# Patient Record
Sex: MALE | Race: OTHER | HISPANIC OR LATINO | Employment: OTHER | URBAN - METROPOLITAN AREA
[De-identification: names, ages, dates, MRNs, and addresses within clinical notes are randomized per-mention and may not be internally consistent; named-entity substitution may affect disease eponyms.]

---

## 2017-03-08 ENCOUNTER — APPOINTMENT (OUTPATIENT)
Dept: LAB | Facility: CLINIC | Age: 42
End: 2017-03-08
Payer: MEDICARE

## 2017-03-08 DIAGNOSIS — M79.7 FIBROMYALGIA: ICD-10-CM

## 2017-03-08 LAB
25(OH)D3 SERPL-MCNC: 27.5 NG/ML (ref 30–100)
CRP SERPL QL: <3 MG/L
ERYTHROCYTE [SEDIMENTATION RATE] IN BLOOD: 29 MM/HOUR (ref 0–10)
T4 FREE SERPL-MCNC: 0.9 NG/DL (ref 0.76–1.46)
TSH SERPL DL<=0.05 MIU/L-ACNC: 4.36 UIU/ML (ref 0.36–3.74)

## 2017-03-08 PROCEDURE — 85652 RBC SED RATE AUTOMATED: CPT

## 2017-03-08 PROCEDURE — 84443 ASSAY THYROID STIM HORMONE: CPT

## 2017-03-08 PROCEDURE — 36415 COLL VENOUS BLD VENIPUNCTURE: CPT

## 2017-03-08 PROCEDURE — 86140 C-REACTIVE PROTEIN: CPT

## 2017-03-08 PROCEDURE — 82306 VITAMIN D 25 HYDROXY: CPT

## 2017-03-08 PROCEDURE — 84439 ASSAY OF FREE THYROXINE: CPT

## 2017-03-23 ENCOUNTER — ALLSCRIPTS OFFICE VISIT (OUTPATIENT)
Dept: OTHER | Facility: OTHER | Age: 42
End: 2017-03-23

## 2017-08-03 ENCOUNTER — ALLSCRIPTS OFFICE VISIT (OUTPATIENT)
Dept: OTHER | Facility: OTHER | Age: 42
End: 2017-08-03

## 2017-08-03 DIAGNOSIS — R79.89 OTHER SPECIFIED ABNORMAL FINDINGS OF BLOOD CHEMISTRY: ICD-10-CM

## 2017-08-03 DIAGNOSIS — R94.6 ABNORMAL RESULTS OF THYROID FUNCTION STUDIES: ICD-10-CM

## 2017-08-04 ENCOUNTER — APPOINTMENT (OUTPATIENT)
Dept: LAB | Facility: CLINIC | Age: 42
End: 2017-08-04
Payer: MEDICARE

## 2017-08-04 DIAGNOSIS — R94.6 ABNORMAL RESULTS OF THYROID FUNCTION STUDIES: ICD-10-CM

## 2017-08-04 DIAGNOSIS — R79.89 OTHER SPECIFIED ABNORMAL FINDINGS OF BLOOD CHEMISTRY: ICD-10-CM

## 2017-08-04 LAB
BUN SERPL-MCNC: 22 MG/DL (ref 5–25)
CREAT SERPL-MCNC: 1.15 MG/DL (ref 0.6–1.3)
CREAT UR-MCNC: 279 MG/DL
GFR SERPL CREATININE-BSD FRML MDRD: 78 ML/MIN/1.73SQ M
PROT UR-MCNC: 13 MG/DL
PROT/CREAT UR: 0.05 MG/G{CREAT} (ref 0–0.1)
T3 SERPL-MCNC: 1.1 NG/ML (ref 0.6–1.8)
T4 FREE SERPL-MCNC: 0.92 NG/DL (ref 0.76–1.46)
TSH SERPL DL<=0.05 MIU/L-ACNC: 3.46 UIU/ML (ref 0.36–3.74)

## 2017-08-04 PROCEDURE — 84156 ASSAY OF PROTEIN URINE: CPT

## 2017-08-04 PROCEDURE — 82570 ASSAY OF URINE CREATININE: CPT

## 2017-08-04 PROCEDURE — 84520 ASSAY OF UREA NITROGEN: CPT

## 2017-08-04 PROCEDURE — 84439 ASSAY OF FREE THYROXINE: CPT

## 2017-08-04 PROCEDURE — 82565 ASSAY OF CREATININE: CPT

## 2017-08-04 PROCEDURE — 36415 COLL VENOUS BLD VENIPUNCTURE: CPT

## 2017-08-04 PROCEDURE — 84443 ASSAY THYROID STIM HORMONE: CPT

## 2017-08-04 PROCEDURE — 84480 ASSAY TRIIODOTHYRONINE (T3): CPT

## 2017-08-10 ENCOUNTER — GENERIC CONVERSION - ENCOUNTER (OUTPATIENT)
Dept: OTHER | Facility: OTHER | Age: 42
End: 2017-08-10

## 2017-08-24 ENCOUNTER — ALLSCRIPTS OFFICE VISIT (OUTPATIENT)
Dept: OTHER | Facility: OTHER | Age: 42
End: 2017-08-24

## 2017-10-19 ENCOUNTER — ALLSCRIPTS OFFICE VISIT (OUTPATIENT)
Dept: OTHER | Facility: OTHER | Age: 42
End: 2017-10-19

## 2018-01-10 NOTE — PROGRESS NOTES
Chief Complaint  PT  CAME INTO THE OFFICE TODAY FOR A FLU VACCINE, GIVEN PER PROTOCOL      Active Problems    1  Abnormal thyroid function test (794 5) (R94 6)   2  Bipolar disease, chronic (296 80) (F31 9)   3  Chronic fatigue (780 79) (R53 82)   4  Chronic low back pain (724 2,338 29) (M54 5,G89 29)   5  Creatinine elevation (790 99) (R79 89)   6  Depression with anxiety (300 4) (F41 8)   7  Fibromyalgia (729 1) (M79 7)   8  Hypogonadism   9  Need for influenza vaccination (V04 81) (Z23)   10  History of Need for prophylactic vaccination and inoculation against influenza (V04 81)    (Z23)    Current Meds   1  BuPROPion HCl ER (XL) 150 MG Oral Tablet Extended Release 24 Hour; Therapy: 23HSV3933 to Recorded   2  ClonazePAM 0 5 MG Oral Tablet; TAKE 1 TABLET 3 times daily; Therapy: 42UYF2770 to Recorded   3  Divalproex Sodium 250 MG Oral Tablet Delayed Release; Therapy: 55MAD3940 to Recorded   4  Gabapentin 100 MG Oral Capsule; TAKE 3 CAPSULE Twice daily; Therapy: 92Gfj9361 to (Evaluate:20Jun2017)  Requested for: 78Zck5152; Last   Rx:99Cbt8413 Ordered   5  Meloxicam 7 5 MG Oral Tablet; TAKE 1 TABLET Twice daily PRN PAIN WITH FOOD; Therapy: 59WDC5406 to (Evaluate:59Ufs9089)  Requested for: 96Qgv2582; Last   Rx:23Mar2017 Ordered   6  QUEtiapine Fumarate 200 MG Oral Tablet; Therapy: 45FPZ8708 to Recorded   7  Sertraline HCl - 100 MG Oral Tablet; TAKE 1 TABLET Bedtime; Therapy: 96KUO1041 to (Evaluate:45Liy7101) Recorded   8  TraZODone HCl - 150 MG Oral Tablet; TAKE 1 TABLET AT BEDTIME; Therapy: 77BGL4110 to Recorded    Allergies    1   No Known Drug Allergies    Plan   Flulaval Quadrivalent Intramuscular Suspension; INJECT 0 5  ML Intramuscular; Dose: 0 5; Route: Intramuscular; Site: Right Deltoid; Done: 43UXK5041 02:39PM; Status: Complete - Retrospective By Protocol Authorization;  Ordered  For: Need for influenza vaccination; Ordered By:Vickie Sage; Effective Date:19Oct2017; Administered by: Elvia Mejia, Sri Ruffin: 10/19/2017 2:39:00 PM; Last Updated By: Ozzy Morales; 10/19/2017 5:12:15 PM     Signatures   Electronically signed by : Brittni Aguilar, ; Oct 19 2017  2:41PM EST                       (Author)    Electronically signed by : Rony Richard, [address]; Oct 19 2017  5:12PM EST                       (Author)

## 2018-01-12 VITALS
SYSTOLIC BLOOD PRESSURE: 120 MMHG | WEIGHT: 126.54 LBS | DIASTOLIC BLOOD PRESSURE: 80 MMHG | HEART RATE: 84 BPM | HEIGHT: 63 IN | TEMPERATURE: 98.8 F | BODY MASS INDEX: 22.42 KG/M2

## 2018-01-12 NOTE — PROGRESS NOTES
Assessment    1  Fibromyalgia (729 1) (M79 7)   2  Chronic low back pain (724 2,338 29) (M54 5,G89 29)   3  Chronic fatigue (780 79) (R53 82)   4  Depression with anxiety (300 4) (F41 8)    Plan  Fibromyalgia    · Gabapentin 100 MG Oral Capsule; TAKE 3 CAPSULE Twice daily   · (1) C-REACTIVE PROTEIN; Status:Active; Requested for:89Bix8084;    · (1) SED RATE; Status:Active; Requested for:31Yft5044;    · (1) TSH WITH FT4 REFLEX; Status:Active; Requested for:84Xfc8090;    · (1) VITAMIN D 25-HYDROXY; Status:Active; Requested for:38Tzo3926;    · Follow-up visit in 3 months Evaluation and Treatment  Follow-up  Status: Hold For -  Scheduling  Requested for: 31QUD6073   · Call [phone] if: The pain seems worse ; Status:Complete;   Done: 90FCT4290  PMH: Need for influenza vaccination    · Flulaval Quadrivalent Intramuscular Suspension    Discussion/Summary    Mr Arya Giles is a 44-year-old  male who presents the office with a history of constant pain in his neck, lower back, and shoulders  He does report that he has a history of a cervical fusion which was performed several years ago  He recently had an MRI of his lumbar spine, which did show some bulging disks  He will be scheduled for an epidural injection in the near future  He is currently utilizing gabapentin 100 mg once or twice a day as needed for his discomfort which provides mild relief  He does state that his lower back pain radiates into his buttocks bilaterally, and into his left leg  He reports paresthesias in his left leg to the level of the toes  He does state that his radicular pain localizes to his knees bilaterally left more so than right  He has also tried tramadol for pain in the past, but he does feel this is "too strong " He states that her pain is worsened by lifting heavy objects or walking long distances  He describes his pain as being sharp, aching, burning, and stabbing in quality   He does report some intermittent arthralgias in his hands, with some swelling noted in the right hand  He denies any other obvious joint swelling  He reports morning stiffness typically lasting about 20 minutes before improvement  He reports difficulty sleeping due to pain, nonrestorative sleep, and occasional fatigue  On exam, there is no active synovitis  He does have mild tenderness to palpation of the first MCPs bilaterally  There are multiple myofascial tender points throughout the spine  He has decreased range of motion of the cervical and lumbar spine in all planes of motion  There is no objective muscular weakness  Reflexes are symmetric throughout  Review of laboratory studies from August 24, 2016 revealed a normal CBC and CMP, as well as negative Lyme antibody, DAMON, rheumatoid factor, and CCP  At this time, his history, exam, and laboratory studies do appear most consistent with a chronic myofascial pain syndrome, likely fibromyalgia given his history of headaches and intermittent bowel issues  I did explain the gabapentin is not meant to be taken as needed, but more consistently so they settled the nerve fibers down and have better efficacy on his pain  I will increase his dose of gabapentin to 300 mg twice a day  I will check some additional laboratory studies to completely rule out any metabolic or inflammatory cause for his pain  I will reevaluate him in 3 months  He will call in the interim if there are any questions or concerns  Patient is able to Self-Care  Counseling  Rheumatology Counseling Documentation: The patient was counseled regarding diagnostic results, instructions for management, impressions and risks and benefits of treatment options  Chief Complaint    1  Arthralgias    History of Present Illness  Pt  is a 41 yo  male who presents with history of cervical fusion several years ago  Also with LBP pain  Recently had MRI which showed bulging discs  c/o constant pain in neck, lower back, and shoulders   Takes Gabapentin 100mg once or twice daily for pain as needed  LBP radiates into buttocks B/L and L leg  + paresthesias in L leg to the toes  Radicular pain localizes to B/L knees  Has also tried to Tramadol for pain, but was too strong  Pain is worsened by lifting heavy objects or walking long distances  Pain is sharp, aching, stabbing and burning in quality  + swelling in R hand  No other obvious joint swelling  + AM stiffness x 20 minutes  + difficulty sleeping due to pain  + non-restorative sleep  + occasional fatigue  Pain Assessment   the patient states they have pain  The pain is located in the neck, lower back and both shoulders  The patient describes the pain as sharp, aching, burning and throbbing  (on a scale of 0 to 10, the patient rates the pain at 6 )    Prefered Language is  [place]  Primary Language is  English  Review of Systems    Constitutional: no fever, no recent weight gain, no chills, no recent weight loss and no anorexia    The patient presents with complaints of occasional episodes of fatigue  HEENT: feeling congested, but no blurred vision, no double vision, no amaurosis fugax, no dryness of the eyes, no eye pain, no erythema eye(s), no dryness mouth and no mouth sores    The patient presents with complaints of sore throat starting about 1 week ago  Cardiovascular: no chest pain or pressure, no dyspnea on exertion and no swelling in the arms or legs  Respiratory: no unusual or persistent cough and no shortness of breath  Gastrointestinal: abdominal pain and constipation, but no nausea, no vomiting, no heartburn, no melena and no BRBPR    The patient presents with complaints of diarrhea starting about 1 week ago  Genitourinary: no foamy urine and increased frequency, but no hematuria    The patient presents with complaints of occasional episodes of dysuria  Musculoskeletal: as noted in HPI  Integumentary no rash, no Raynaud's, no alopecia, no nail changes and no photosensitivity  Endocrine polydipsia, but no polyuria  Hematologic/Lymphatic: a tendency for easy bruising, but no unusual bleeding  Neurological: headache, tingling and weakness    The patient presents with complaints of occasional episodes of vertigo or dizziness, described as spinning sensation  Symptoms are made worse by bending over and getting up quickly  Psychiatric: insomnia, non-restorative sleep, depression and anxiety  Active Problems    1  Arthralgia (719 40) (M25 50)   2  Chronic fatigue (780 79) (R53 82)   3  Chronic low back pain (724 2,338 29) (M54 5,G89 29)   4  Depression with anxiety (300 4) (F41 8)   5  Hemorrhoids (455 6) (K64 9)   6  Hypogonadism   7  History of Need for prophylactic vaccination and inoculation against influenza (V04 81)   (Z23)    Past Medical History    1  History of Need for influenza vaccination (V04 81) (Z23)   2  History of Need for prophylactic vaccination and inoculation against influenza (V04 81)   (Z23)    Surgical History    1  History of Cervical Vertebral Fusion    Family History  Mother    1  Family history of arthritis (V17 7) (Z82 61)   2  Family history of hyperlipidemia (V18 19) (Z83 49)  Father    3  Family history of hyperthyroidism (V18 19) (Z83 49)   4  Family history of Prostate disease  Sister    11  Family history of Colon cancer   6  Family history of epilepsy (V17 2) (Z82 0)  Brother    9  Family history of asthma (V17 5) (Z82 5)  Family History    8  Denied: Family history of rheumatoid arthritis    Social History    · Disabled   · Never A Smoker   · No alcohol use   · No illicit drug use    Current Meds   1  BuPROPion HCl - 75 MG Oral Tablet; Take 1 tablet daily; Therapy: 04CCW0785 to (Evaluate:45Zrv5069) Recorded   2  ClonazePAM 0 5 MG Oral Tablet; TAKE 1 TABLET 3 times daily; Therapy: 35KMU0396 to Recorded   3  Divalproex Sodium 250 MG Oral Tablet Delayed Release; Therapy: 19AYB7146 to Recorded   4   Meloxicam 15 MG Oral Tablet; TAKE 1 TABLET DAILY AS NEEDED FOR PAIN;   Therapy: 14HRO4880 to (Evaluate:17Oct2016)  Requested for: 55Xof9869; Last   Rx:10Abv2984 Ordered   5  Sertraline HCl - 100 MG Oral Tablet; TAKE 1 TABLET Bedtime; Therapy: 94AWK0114 to (Evaluate:90Yrq5067) Recorded   6  TraZODone HCl - 150 MG Oral Tablet; TAKE 1 TABLET AT BEDTIME; Therapy: 00ZUZ1235 to Recorded    Allergies    1  No Known Drug Allergies    Vitals  Signs   Recorded: 22Dec2016 02:51PM   Temperature: 98 F  Heart Rate: 72  Systolic: 238  Diastolic: 80  Height: 5 ft 3 in  Weight: 135 lb 5 79 oz  BMI Calculated: 23 98  BSA Calculated: 1 64    Physical Exam    Constitutional   General appearance: Abnormal   appears tired  Eyes   Conjunctiva and lids: No swelling, erythema or discharge  Pupils and irises: Equal, round and reactive to light  Ears, Nose, Mouth, and Throat   External inspection of ears and nose: Normal     Oropharynx: Normal with no erythema, edema, exudate lesions, or ulcers  Pulmonary   Respiratory effort: No increased work of breathing or signs of respiratory distress  Auscultation of lungs: Clear to auscultation  Cardiovascular   Auscultation of heart: Normal rate and rhythm, normal S1 and S2, without murmurs  Examination of extremities for edema and/or varicosities: Normal     Lymphatic   Palpation of lymph nodes in neck: No lymphadenopathy  Psychiatric   Orientation to person, place, and time: Normal     Mood and affect: Normal         Right glenohumeral joint tenderness  Left glenohumeral joint tenderness  Musculoskeletal - Muscle strength/tone: Normal  Motor Strength Findings: right hand dominance, but normal upper extremity strength and normal lower extremity strength  Right upper extremity: shoulder flexion 5/5, shoulder extension 5/5, biceps 5/5, triceps 5/5, but normal hand   Left upper extremity shoulder flexion 5/5, shoulder extension 5/5, biceps 5/5, triceps 5/5, but normal hand      Right lower extremity strength: hip flexion 5/5, hip abduction 5/5, hip adduction 5/5, knee flexion 5/5, knee extension 5/5, ankle dorsiflexion 5/5, ankle plantar flexion 5/5  Left lower extremity strength: hip flexion 5/5, hip abduction 5/5, hip adduction 5/5, knee flexion 5/5, knee extension 5/5, ankle dorsiflexion 5/5, ankle plantar flexion 5/5  Skin - Skin and subcutaneous tissue: Normal    Neurologic - Reflexes: Normal  Deep tendon reflexes: 2+ right biceps, 2+ left biceps, 2+ right triceps, 2+ left triceps, 2+ right brachioradialis, 2+ left brachioradialis, 2+ right patella, 2+ left patella, 2+ right ankle jerk and 2+ left ankle jerk  Sensation: Normal    Additional Findings - Decreased ROM C-spine and L-spine; + multiple myofascial tender points throughout spine  Results/Data  * MRI CERVICAL SPINE WO CONTRAST [id number] 07:22PM EPIC, Provider   Test ordered by: Conrado Bolanos     Test Name Result Flag Reference   MRI CERVICAL SPINE WO CONTRAST (Report)     This is a summary report  The complete report is available in the patient's medical record  If you cannot access the medical record, please contact the sending organization for a detailed fax or copy  MRI CERVICAL SPINE WITHOUT CONTRAST     INDICATION: 57-year-old male, postlaminectomy syndrome, radiculopathy   COMPARISON: 11/11/2011 x-rays     TECHNIQUE: Sagittal T1, sagittal T2, sagittal inversion recovery, axial T2, axial 2D merge        IMAGE QUALITY: Diagnostic     FINDINGS:   Intact appearing plate and screw ACDF with interbody spacer at C5-6, unchanged     ALIGNMENT: Normal alignment of the cervical spine  No compression fracture  No subluxation  No scoliosis  MARROW SIGNAL: Normal marrow signal is identified within the visualized bony structures  No discrete marrow lesion  CERVICAL AND VISUALIZED THORACIC CORD: Normal signal within the visualized cord       PREVERTEBRAL AND PARASPINAL SOFT TISSUES: Prevertebral and paraspinal soft tissues are unremarkable  VISUALIZED POSTERIOR FOSSA: The visualized posterior fossa demonstrates no abnormal signal      CERVICAL DISC SPACES:        C2-C3: Normal      C3-C4: Normal      C4-C5: Normal      C5-C6: Intact appearing ACDF, mild left foraminal stenosis, patent central canal and right foramen, no overt neural element impingement  C6-C7: Minimal degenerative disc and facet disease, no stenosis     C7-T1: Normal      UPPER THORACIC DISC SPACES: Normal        IMPRESSION:   Intact appearing ACDF C5-6, similar to prior x-rays     Mild noncompressive left foraminal stenosis C5-6       Workstation performed: [id number]     Signed by:   Nino Diego MD   12/19/16     * MRI LUMBAR SPINE WO CONTRAST [id number] 09:17PM EPIC, Provider   Test ordered by: Leann Diane     Test Name Result Flag Reference   MRI LUMBAR SPINE WO CONTRAST (Report)     MRI LUMBAR SPINE WITHOUT CONTRAST     INDICATION: Lumbar radiculopathy  Bilateral lower extremity numbness     COMPARISON: 3/28/2012     TECHNIQUE: Sagittal T1, sagittal T2, sagittal inversion recovery, axial T1 and axial T2, coronal T2       IMAGE QUALITY: Diagnostic     FINDINGS:     ALIGNMENT: Normal alignment of the lumbar spine  No compression fracture  No spondylolysis or spondylolisthesis  No scoliosis  MARROW SIGNAL: Normal marrow signal is identified within the visualized bony structures  No discrete marrow lesion  DISTAL CORD AND CONUS: Normal size and signal within the distal cord and conus  The conus ends at the L1 level  PARASPINAL SOFT TISSUES: Paraspinal soft tissues are unremarkable  SACRUM: Normal signal within the sacrum  No evidence of insufficiency or stress fracture  LOWER THORACIC DISC SPACES: Normal disc height and signal  No disc herniation, canal stenosis or foraminal narrowing  LUMBAR DISC SPACES:        L1-L2: Normal      L2-L3: Normal      L3-L4: There is a diffuse disk bulge   No significant central canal or neural foraminal narrowing  L4-L5: There is a diffuse disk bulge  No significant central canal or neural foraminal narrowing  L5-S1: There is a diffuse disk bulge  No significant central canal or neural foraminal narrowing  IMPRESSION:     There is no focal disk herniation, central canal or neural foraminal narrowing  Workstation performed: [id number]     Signed by: Solitario Juarez MD   11/10/16     (1) CBC/PLT/DIFF 46ZQI5664 10:19AM Sharif Cord Order Number: [id number]     Test Name Result Flag Reference   WBC COUNT 5 24 Thousand/uL  4 31-10 16   RBC COUNT 5 30 Million/uL  3 88-5 62   HEMOGLOBIN 15 3 g/dL  12 0-17 0   HEMATOCRIT 44 5 %  36 5-49 3   MCV 84 fL  82-98   MCH 28 9 pg  26 8-34 3   MCHC 34 4 g/dL  31 4-37 4   RDW 12 7 %  11 6-15 1   MPV 10 7 fL  8 9-12 7   PLATELET COUNT 468 Thousands/uL  149-390   nRBC AUTOMATED 0 /100 WBCs     NEUTROPHILS RELATIVE PERCENT 53 %  43-75   LYMPHOCYTES RELATIVE PERCENT 35 %  14-44   MONOCYTES RELATIVE PERCENT 10 %  4-12   EOSINOPHILS RELATIVE PERCENT 2 %  0-6   BASOPHILS RELATIVE PERCENT 0 %  0-1   NEUTROPHILS ABSOLUTE COUNT 2 78 Thousands/?L  1 85-7 62   LYMPHOCYTES ABSOLUTE COUNT 1 83 Thousands/?L  0 60-4 47   MONOCYTES ABSOLUTE COUNT 0 52 Thousand/?L  0 17-1 22   EOSINOPHILS ABSOLUTE COUNT 0 09 Thousand/?L  0 00-0 61   BASOPHILS ABSOLUTE COUNT 0 01 Thousands/?L  0 00-0 10   - Patient Instructions: This bloodwork is non-fasting  Please drink two glasses of water morning of bloodwork  - Patient Instructions: This bloodwork is non-fasting  Please drink two glasses of water morning of bloodwork  (1) COMPREHENSIVE METABOLIC PANEL 85NCQ9277 33:73DA Sharif Cord Order Number: [id number]     Test Name Result Flag Reference   GLUCOSE,RANDM 82 mg/dL     If the patient is fasting, the ADA then defines impaired fasting glucose as > 100 mg/dL and diabetes as > or equal to 123 mg/dL     SODIUM 140 mmol/L  136-145   POTASSIUM 4 2 mmol/L  3 5-5 3   CHLORIDE 102 mmol/L  100-108   CARBON DIOXIDE 29 mmol/L  21-32   ANION GAP (CALC) 9 mmol/L  4-13   BLOOD UREA NITROGEN 26 mg/dL H 5-25   CREATININE 1 19 mg/dL  0 60-1 30   Standardized to IDMS reference method   CALCIUM 9 8 mg/dL  8 3-10 1   BILI, TOTAL 0 52 mg/dL  0 20-1 00   ALK PHOSPHATAS 49 U/L     ALT (SGPT) 30 U/L  12-78   AST(SGOT) 17 U/L  5-45   ALBUMIN 4 3 g/dL  3 5-5 0   TOTAL PROTEIN 8 2 g/dL  6 4-8 2   eGFR Non-African American      >60 0 ml/min/1 73sq m   [facility] Disease Education Program recommendations are as follows:  GFR calculation is accurate only with a steady state creatinine  Chronic Kidney disease less than 60 ml/min/1 73 sq  meters  Kidney failure less than 15 ml/min/1 73 sq  meters  (1) RHEUMATOID FACTOR SCREEN 48Edg8006 10:19AM Ra Harrell Order Number: [id number]     Test Name Result Flag Reference   RHEUMATOID FACTOR Negative  Negative     (1) DAMON SCREEN W/REFLEX TO TITER/PATTERN 38ACV1482 10:19AM Ra Harrell Order Number: [id number]     Test Name Result Flag Reference   DAMON SCREEN  Negative  Negative     (1) CYCLIC CITRULLINATED PEPTIDE 30Isg4327 10:19AM Ra Harrell Order Number: [id number]     Test Name Result Flag Reference   CYCLIC CITRULLINATED PEPTIDE 12 units  0 - 19   Negative               <20                            Weak positive      20 - 39                            Moderate positive  40 - 59                            Strong positive        >59    Performed at:  [address]  [id number]  : Harleen Pean MD, Phone:  [phone]     (1) LYME ANTIBODY PROFILE W/REFLEX TO WESTERN BLOT 53Mle1403 10:19AM Ra Harrell Order Number: [id number]     Test Name Result Flag Reference   LYME IGG 0 31  0 00-0 79   NEGATIVE(0 00-0 79)-Absence of detectable Borrelia IgG Antibodies   A negative result does not exclude the possibility of Borrelia infection  If early Lyme disease is suspected,a second sample should be collected & tested 4 weeks after initial testing  LYME IGM 0 17  0 00-0 79   NEGATIVE (0 00-0 79)-Absence of detectable Borrelia IgM antibodies  A negative result does not exclude the possibility of Borrelia infection  If early lyme disease is suspected, a second sample should be collected & tested 4 weeks after initial testing         Future Appointments    Date/Time Provider Specialty Site   03/23/2017 03:00 PM See Gruber, [address] [place] [address]     Signatures   Electronically signed by : Wanda Alonzo DO; Dec 22 2016  4:37PM EST                       (Author)

## 2018-01-13 NOTE — PROGRESS NOTES
Assessment    1  Fibromyalgia (729 1) (M79 7)   2  Chronic low back pain (724 2,338 29) (M54 5,G89 29)   3  Depression with anxiety (300 4) (F41 8)   4  Bipolar disease, chronic (296 80) (F31 9)    Plan    1  Follow-up visit in 6 months Evaluation and Treatment  Follow-up  Status: Hold For -   Scheduling  Requested for: 30Ldi4517   2  Call [phone] if: The pain seems worse ; Status:Complete;   Done: 49FTM1549   3  Call [phone] if: The symptoms seem worse ; Status:Complete;   Done:   47WCT1937   4  Call [phone] if: You have questions or concerns about your problem ;   Status:Complete;   Done: 89VCB4223    5  Gabapentin 100 MG Oral Capsule; TAKE 3 CAPSULE Twice daily   6  Meloxicam 7 5 MG Oral Tablet; TAKE 1 TABLET Twice daily PRN PAIN WITH   FOOD    Discussion/Summary    This is a 19-year-old gentleman presenting today for follow-up for fibromyalgia  Patient states he feels stable  He is utilizing gabapentin and meloxicam as needed  He describes neck as well as bilateral shoulder and low back pain  He also describes shooting pain in the left knee as well as the left thumb  He is seeing pain management at this time for injections into the lumbar spine  He did note episode of swelling in the right index finger however denies any further joint swelling  He does have morning stiffness lasting about 5 minutes  He describes some difficulty with sleep as well as fatigue  On physical examination, patient does have tenderness of the CMC joints bilaterally as well as both shoulders  He does have tenderness of the cervical and lumbar spine with multiple myofascial tender points  He has tenderness of the hips as well as the knees in the ankles  There is crepitus of the knees   At this time patient's history and physical examination is most consistent with fibromyalgia which appears to be stable with the use of gabapentin and meloxicam  He will continue follow-up with pain management at this time for further treatment of his low back pain  He will plan to return to the office in 6 months time for further evaluation however contact the office in the interim if he has any further questions or concerns  Counseling  Rheumatology Counseling Documentation: The patient was counseled regarding instructions for management, prognosis, patient and family education, risks and benefits of treatment options and importance of compliance with treatment  Chief Complaint  F/U FMS   Patient is here today for follow up of chronic conditions described in HPI  History of Present Illness  Patient is in the office today for follow up for FMS  Feeling stable at this time  Taking gabapentin and Mobic with some relief but rarely takes  Pain in the neck, shoulders, and low back  Also shooting pain in to the left knee and pain in the left thumb  +Swelling in the right index finger  No other obvious joint swelling  +Am stiffness x 5 minutes  +Some difficulty with sleep  +Some fatigue  Review of Systems    Constitutional: fatigue, recent 3 lb weight loss and anorexia, but no fever, no recent weight gain and no chills  HEENT: blurred vision and amaurosis fugax, but no double vision, no dryness of the eyes, no dryness mouth, not feeling congested and no sore throat  Cardiovascular: dyspnea on exertion, but no chest pain or pressure  Respiratory: no unusual or persistent cough, no shortness of breath and no pleurisy  Gastrointestinal: abdominal pain and constipation, but no nausea, no vomiting, no diarrhea, no melena and no BRBPR  Genitourinary: dysuria, but no hematuria  Musculoskeletal: as noted in HPI  Integumentary no rash, no Raynaud's and no alopecia  Endocrine no polyuria and no polydipsia  Hematologic/Lymphatic: no unusual bleeding and no tendency for easy bruising  Neurological: vertigo or dizziness, tingling and weakness, but no headache  Psychiatric: insomnia and non-restorative sleep  Active Problems    1  Abnormal thyroid function test (794 5) (R94 6)   2  Bipolar disease, chronic (296 80) (F31 9)   3  Chronic fatigue (780 79) (R53 82)   4  Chronic low back pain (724 2,338 29) (M54 5,G89 29)   5  Creatinine elevation (790 99) (R79 89)   6  Depression with anxiety (300 4) (F41 8)   7  Fibromyalgia (729 1) (M79 7)   8  Hypogonadism   9  History of Need for prophylactic vaccination and inoculation against influenza (V04 81)   (Z23)    Past Medical History    1  History of hemorrhoids (V13 89) (Z87 19)   2  History of joint pain (V13 59) (Z87 39)   3  History of Need for influenza vaccination (V04 81) (Z23)   4  History of Need for prophylactic vaccination and inoculation against influenza (V04 81)   (Z23)    The active problems and past medical history were reviewed and updated today  Surgical History    1  History of Cervical Vertebral Fusion    The surgical history was reviewed and updated today  Family History  Mother    1  Family history of arthritis (V17 7) (Z82 61)   2  Family history of hyperlipidemia (V18 19) (Z83 49)  Father    3  Family history of hyperthyroidism (V18 19) (Z83 49)   4  Family history of Prostate disease  Sister    11  Family history of Colon cancer   6  Family history of epilepsy (V17 2) (Z82 0)  Brother    9  Family history of asthma (V17 5) (Z82 5)  Family History    8  Denied: Family history of rheumatoid arthritis    The family history was reviewed and updated today  Social History    · Disabled   · Never A Smoker   · No alcohol use   · No illicit drug use  The social history was reviewed and updated today  The social history was reviewed and is unchanged  Current Meds   1  BuPROPion HCl ER (XL) 150 MG Oral Tablet Extended Release 24 Hour; Therapy: 31NBF7622 to Recorded   2  ClonazePAM 0 5 MG Oral Tablet; TAKE 1 TABLET 3 times daily; Therapy: 45MLE5066 to Recorded   3  Divalproex Sodium 250 MG Oral Tablet Delayed Release;    Therapy: 26ADJ5878 to Recorded   4  Gabapentin 100 MG Oral Capsule; TAKE 3 CAPSULE Twice daily; Therapy: 77Uas7388 to (Evaluate:20Jun2017)  Requested for: 23Mar2017; Last   Rx:84Sgz3747 Ordered   5  Meloxicam 7 5 MG Oral Tablet; TAKE 1 TABLET Twice daily PRN PAIN WITH FOOD; Therapy: 85QTH6047 to (Evaluate:43Yde7029)  Requested for: 28LZB3324; Last   Rx:23Mar2017 Ordered   6  QUEtiapine Fumarate 200 MG Oral Tablet; Therapy: 27AMO5561 to Recorded   7  Sertraline HCl - 100 MG Oral Tablet; TAKE 1 TABLET Bedtime; Therapy: 03OCA7023 to (Evaluate:63Fyx8715) Recorded   8  TraZODone HCl - 150 MG Oral Tablet; TAKE 1 TABLET AT BEDTIME; Therapy: 33EED9949 to Recorded    The medication list was reviewed and updated today  Immunizations  Influenza --- Kristal Sarmiento: [id number]-4390Akukfyr Sawyer: 22-Dec-2016     Allergies    1  No Known Drug Allergies    Vitals  Signs   Recorded: 24Aug2017 12:58PM   Temperature: 98 2 F  Heart Rate: 76  Systolic: 168  Diastolic: 88  Height: 5 ft 3 75 in  Weight: 123 lb 14 37 oz  BMI Calculated: 21 44  BSA Calculated: 1 59    Physical Exam    Constitutional   General appearance: No acute distress, well appearing and well nourished  Eyes   Conjunctiva and lids: No swelling, erythema or discharge  Pupils and irises: Equal, round and reactive to light  Ears, Nose, Mouth, and Throat   External inspection of ears and nose: Normal     Oropharynx: Normal with no erythema, edema, exudate lesions, or ulcers  Pulmonary   Respiratory effort: No increased work of breathing or signs of respiratory distress  Auscultation of lungs: Clear to auscultation  Cardiovascular   Auscultation of heart: Normal rate and rhythm, normal S1 and S2, without murmurs  Examination of extremities for edema and/or varicosities: Normal     Lymphatic   Palpation of lymph nodes in neck: No lymphadenopathy      Psychiatric   Orientation to person, place, and time: Normal     Mood and affect: Normal       Right thumb CMC tenderness  Right glenohumeral joint tenderness  Left thumb CMC tenderness  Left glenohumeral joint tenderness  Right knee tenderness  Right hip tenderness  Left ankle tenderness  Left knee tenderness  Left hip tenderness  Musculoskeletal - Joints, bones, and muscles: Abnormal  Palpation - lower mid-lumbar, right lower lumbar, left lower lumbar, C5, C6 and C7 tenderness and bilateral knee crepitus  Right foot: All MTP, PIP and DIP joints are normal  Left foot: All MTP, PIP and DIP joints are normal       Attending Note  Collaborating Physician: I did not interview and examine the patient, I discussed the case with the Advanced Practitioner and reviewed the note and I agree with the Advanced Practitioner note  Signatures   Electronically signed by : Terry Aponte, [facility];  Aug 24 2017  1:15PM EST                       (Author)    Electronically signed by : Wanda Alonzo DO; Aug 24 2017  2:00PM EST                       (Author)

## 2018-01-17 NOTE — PROGRESS NOTES
Assessment    1  Fibromyalgia (729 1) (M79 7)   2  Chronic low back pain (724 2,338 29) (M54 5,G89 29)   3  Depression with anxiety (300 4) (F41 8)   4  Chronic fatigue (780 79) (R53 82)    Plan    1  Meloxicam 7 5 MG Oral Tablet; TAKE 1 TABLET Twice daily PRN PAIN WITH FOOD   2  Gabapentin 100 MG Oral Capsule; TAKE 3 CAPSULE Twice daily   3  Call [phone] if: The pain seems worse ; Status:Complete;   Done: 98CGJ1979   4  Call [phone] if: The symptoms seem worse ; Status:Complete;   Done:   59CLI0564   5  Call [phone] if: You have questions or concerns about your problem ;   Status:Complete;   Done: 76GAL4631   6  Follow-up visit in 4 Months Evaluation and Treatment  Follow-up  Status: Complete    Done: 84HAM8378    Discussion/Summary    This is a 40-year-old female presenting today for follow-up for fibromyalgia  Patient states he continues to have neck, shoulder, and low back pain  He also describes pain behind the right knee  He does have pain in bilateral thumbs as well as the wrists bilaterally  He denies any obvious joint swelling at this time but does have morning stiffness lasting a few minutes  He does have occasional difficulty with sleep due to pain as well as occasional nonrestorative sleep and fatigue  He's been utilizing gabapentin 300 mg daily with some relief  On physical examination, there is no active synovitis  Patient does have tenderness of the thumb joints bilaterally as well as the shoulders bilaterally  He also has tenderness of the cervical and lumbar spine as well as the right knee  There is crepitus of the knees  At this time patient's history and physical examination is most consistent with fibromyalgia which appears to be active at this time despite gabapentin 300 mg daily  Patient would not like to escalate his dose at this time however is interested in re-trialing meloxicam 7 5 mg to be taken twice a day as needed for pain with food   Patient will plan to Follow-up with his pain management physician  He will contact the office if he has any further questions or concerns however will follow-up in 4 months time for further evaluation  Counseling  Rheumatology Counseling Documentation: The patient was counseled regarding instructions for management, prognosis, patient and family education, risks and benefits of treatment options and importance of compliance with treatment  Chief Complaint  F/U FMS   Patient is here today for follow up of chronic conditions described in HPI  History of Present Illness  Patient is in the office today for follow up for FMS  Pain in the neck, shoulders, and low back  Pain behind right knee  +Pain in the thumb and wrists  No obvious joint swelling  +Am stiffness x few minutes  +Occasional difficulty with sleep due to pain  +Occasional non restorative sleep  +Fatigue  Taking Gabapentin 100 mg TID  Pain Assessment   the patient states they have pain  The pain is located in the neck, shoulders, and lower back  (on a scale of 0 to 10, the patient rates the pain at 5 )    Prefered Language is  [place]  Primary Language is  English  Review of Systems    Constitutional: fatigue and anorexia, but no fever, no recent weight gain, no chills and no recent weight loss  HEENT: blurred vision and amaurosis fugax, but no double vision, no dryness of the eyes, no dryness mouth, not feeling congested and no sore throat  Cardiovascular: dyspnea on exertion, but no chest pain or pressure  Respiratory: no unusual or persistent cough, no shortness of breath and no pleurisy  Gastrointestinal: abdominal pain and constipation, but no nausea, no vomiting, no diarrhea, no melena and no BRBPR  Genitourinary: dysuria, but no hematuria  Musculoskeletal: as noted in HPI  Integumentary no rash, no Raynaud's and no alopecia  Endocrine no polyuria and no polydipsia     Hematologic/Lymphatic: no unusual bleeding and no tendency for easy bruising  Neurological: headache, vertigo or dizziness, tingling and weakness  Psychiatric: insomnia and non-restorative sleep  Active Problems    1  Chronic fatigue (780 79) (R53 82)   2  Chronic low back pain (724 2,338 29) (M54 5,G89 29)   3  Depression with anxiety (300 4) (F41 8)   4  Fibromyalgia (729 1) (M79 7)    Past Medical History    1  History of Need for influenza vaccination (V04 81) (Z23)   2  History of Need for prophylactic vaccination and inoculation against influenza (V04 81)   (Z23)    The active problems and past medical history were reviewed and updated today  Surgical History    1  History of Cervical Vertebral Fusion    The surgical history was reviewed and updated today  Family History    The family history was reviewed and updated today  Social History    · Disabled   · Never A Smoker   · No alcohol use   · No illicit drug use  The social history was reviewed and updated today  The social history was reviewed and is unchanged  Current Meds   1  BuPROPion HCl - 75 MG Oral Tablet; Take 1 tablet daily; Therapy: 17RKC4469 to (Evaluate:27Nov2016) Recorded   2  ClonazePAM 0 5 MG Oral Tablet; TAKE 1 TABLET 3 times daily; Therapy: 20QQI0455 to Recorded   3  Divalproex Sodium 250 MG Oral Tablet Delayed Release; Therapy: 20MWO9922 to Recorded   4  Gabapentin 100 MG Oral Capsule; TAKE 3 CAPSULE Twice daily; Therapy: 44Lhf1783 to (Evaluate:20Jun2017)  Requested for: 69Emn7362; Last   Rx:71Nmm6103 Ordered   5  Meloxicam 15 MG Oral Tablet; TAKE 1 TABLET DAILY AS NEEDED FOR PAIN;   Therapy: 03WGZ2902 to (Evaluate:17Oct2016)  Requested for: 70Rmx1819; Last   Rx:81Pdj4970 Ordered   6  Sertraline HCl - 100 MG Oral Tablet; TAKE 1 TABLET Bedtime; Therapy: 62IUF2930 to (Evaluate:41Yvv3254) Recorded   7  TraZODone HCl - 150 MG Oral Tablet; TAKE 1 TABLET AT BEDTIME; Therapy: 48GYX0627 to Recorded    The medication list was reviewed and updated today  Immunizations  Influenza --- Isac Escobedover: [id number]-Age-9214Fggh High: 22-Dec-2016     Allergies    1  No Known Drug Allergies    Vitals  Signs   Recorded: 27CPC5389 02:59PM   Temperature: 16 2 F  Systolic: 213  Diastolic: 84  Height: 5 ft 3 in  Weight: 132 lb 4 48 oz  BMI Calculated: 23 43  BSA Calculated: 1 62    Physical Exam    Constitutional   General appearance: No acute distress, well appearing and well nourished  Eyes   Conjunctiva and lids: No swelling, erythema or discharge  Pupils and irises: Equal, round and reactive to light  Ears, Nose, Mouth, and Throat   External inspection of ears and nose: Normal     Oropharynx: Normal with no erythema, edema, exudate lesions, or ulcers  Pulmonary   Respiratory effort: No increased work of breathing or signs of respiratory distress  Auscultation of lungs: Clear to auscultation  Cardiovascular   Auscultation of heart: Normal rate and rhythm, normal S1 and S2, without murmurs  Examination of extremities for edema and/or varicosities: Normal     Lymphatic   Palpation of lymph nodes in neck: No lymphadenopathy  Psychiatric   Orientation to person, place, and time: Normal     Mood and affect: Normal       Right thumb CMC tenderness  Right glenohumeral joint tenderness  Left thumb CMC tenderness  Left glenohumeral joint tenderness  Right knee tenderness  Musculoskeletal - Joints, bones, and muscles: Abnormal  Palpation - lower mid-lumbar, right lower lumbar, left lower lumbar, C5, C6 and C7 tenderness and bilateral knee crepitus  Right hand: All MCP, PIP and DIP joints are normal  Left Hand: All MCP, PIP and DIP joints are normal    Right foot: All MTP, PIP and DIP joints are normal  Left foot: All MTP, PIP and DIP joints are normal       Attending Note  Collaborating Physician: I did not interview and examine the patient, I discussed the case with the Advanced Practitioner and reviewed the note and I agree with the Advanced Practitioner note  Signatures   Electronically signed by : SUSANA Andrews; Mar 23 2017  3:23PM EST                       (Author)    Electronically signed by : Brandon Sue DO; Mar 23 2017  4:25PM EST                       (Author)

## 2018-01-22 VITALS
HEART RATE: 76 BPM | WEIGHT: 123.9 LBS | HEIGHT: 64 IN | TEMPERATURE: 98.2 F | BODY MASS INDEX: 21.15 KG/M2 | DIASTOLIC BLOOD PRESSURE: 88 MMHG | SYSTOLIC BLOOD PRESSURE: 112 MMHG

## 2018-01-22 VITALS
DIASTOLIC BLOOD PRESSURE: 84 MMHG | WEIGHT: 132.28 LBS | TEMPERATURE: 98.8 F | HEIGHT: 63 IN | BODY MASS INDEX: 23.44 KG/M2 | SYSTOLIC BLOOD PRESSURE: 116 MMHG

## 2018-07-17 ENCOUNTER — TELEPHONE (OUTPATIENT)
Dept: INTERNAL MEDICINE CLINIC | Facility: CLINIC | Age: 43
End: 2018-07-17

## 2018-07-17 DIAGNOSIS — M79.7 FIBROMYALGIA: Primary | ICD-10-CM

## 2018-07-17 PROBLEM — F31.9 BIPOLAR DISEASE, CHRONIC (HCC): Status: ACTIVE | Noted: 2017-08-03

## 2018-07-17 RX ORDER — BUPROPION HYDROCHLORIDE 150 MG/1
TABLET ORAL
COMMUNITY
Start: 2017-07-31 | End: 2018-07-24 | Stop reason: ALTCHOICE

## 2018-07-17 RX ORDER — CLONAZEPAM 0.5 MG/1
1 TABLET ORAL 3 TIMES DAILY
COMMUNITY
Start: 2016-08-18 | End: 2019-05-07 | Stop reason: ALTCHOICE

## 2018-07-17 RX ORDER — GABAPENTIN 100 MG/1
3 CAPSULE ORAL 2 TIMES DAILY
COMMUNITY
Start: 2016-12-22 | End: 2018-08-08

## 2018-07-17 RX ORDER — DIVALPROEX SODIUM 250 MG/1
TABLET, DELAYED RELEASE ORAL
COMMUNITY
Start: 2016-07-14 | End: 2018-07-24 | Stop reason: ALTCHOICE

## 2018-07-17 RX ORDER — TRAZODONE HYDROCHLORIDE 150 MG/1
1 TABLET ORAL
COMMUNITY
Start: 2016-10-28 | End: 2018-07-24 | Stop reason: ALTCHOICE

## 2018-07-17 RX ORDER — QUETIAPINE FUMARATE 200 MG/1
TABLET, FILM COATED ORAL
COMMUNITY
Start: 2017-07-31 | End: 2018-09-12 | Stop reason: ALTCHOICE

## 2018-07-17 RX ORDER — MELOXICAM 7.5 MG/1
1 TABLET ORAL 2 TIMES DAILY
COMMUNITY
Start: 2017-03-23 | End: 2018-08-07 | Stop reason: SDUPTHER

## 2018-07-17 NOTE — TELEPHONE ENCOUNTER
Can you please put in an order for Rheumatology  Patient has an appointment with Luis Hobbs on 7/24/18  Thank you

## 2018-07-24 ENCOUNTER — OFFICE VISIT (OUTPATIENT)
Dept: MULTI SPECIALTY CLINIC | Facility: CLINIC | Age: 43
End: 2018-07-24
Payer: MEDICARE

## 2018-07-24 VITALS
DIASTOLIC BLOOD PRESSURE: 88 MMHG | HEART RATE: 80 BPM | HEIGHT: 64 IN | SYSTOLIC BLOOD PRESSURE: 122 MMHG | WEIGHT: 123.9 LBS | BODY MASS INDEX: 21.15 KG/M2 | TEMPERATURE: 98.6 F

## 2018-07-24 DIAGNOSIS — G89.29 CHRONIC LOW BACK PAIN, UNSPECIFIED BACK PAIN LATERALITY, WITH SCIATICA PRESENCE UNSPECIFIED: ICD-10-CM

## 2018-07-24 DIAGNOSIS — F31.9 BIPOLAR DISEASE, CHRONIC (HCC): ICD-10-CM

## 2018-07-24 DIAGNOSIS — M79.7 FIBROMYALGIA: ICD-10-CM

## 2018-07-24 DIAGNOSIS — F41.8 DEPRESSION WITH ANXIETY: Primary | ICD-10-CM

## 2018-07-24 DIAGNOSIS — M54.5 CHRONIC LOW BACK PAIN, UNSPECIFIED BACK PAIN LATERALITY, WITH SCIATICA PRESENCE UNSPECIFIED: ICD-10-CM

## 2018-07-24 PROCEDURE — 99214 OFFICE O/P EST MOD 30 MIN: CPT | Performed by: PHYSICIAN ASSISTANT

## 2018-07-24 RX ORDER — DULOXETIN HYDROCHLORIDE 20 MG/1
20 CAPSULE, DELAYED RELEASE ORAL DAILY
COMMUNITY
End: 2019-05-07 | Stop reason: ALTCHOICE

## 2018-07-24 NOTE — PROGRESS NOTES
No problem-specific Assessment & Plan notes found for this encounter  Plan:  Diagnoses and all orders for this visit:    Depression with anxiety    Fibromyalgia  -     Ambulatory referral to Rheumatology    Chronic low back pain, unspecified back pain laterality, with sciatica presence unspecified    Bipolar disease, chronic ([address] )      Assessment: This is a 25-year-old gentleman presenting today for follow-up for fibromyalgia  The patient states that he did much pain at this time he is close 1 left hip and does describe a clicking sensation  He also notes some swelling and pain in left knee  He does note pain in the hands primarily on the left side as well as weakness  He denies any further obvious joint swelling  He has morning stiffness lasting a few minutes  He notes difficulty with sleep due to pain as well as non restorative sleep and fatigue  He has not been seen in the office for several months as he was adjusting medications through his psychiatrist   He states that he would however like to restart gabapentin and meloxicam at this time  On physical examination, there is no active synovitis  He has tenderness of the CMC joints bilaterally as well as bilateral shoulders  There are multiple myofascial tender points of the spine as well as tenderness of the hips, the left knee, left ankle  There is crepitus of the knees  At this time patient's history and physical examination is most consistent with fibromyalgia which appears to be mildly active at this time off of treatment  We will restart meloxicam 7 5 mg to take twice daily as needed for pain and he will start this medication 1st and waited few days until beginning gabapentin  We will restart gabapentin 300 mg daily and increase to twice daily  Patient will plan to return to the office in 6 months time was asked to follow up with Pain Management as needed    He will contact the office if he has any further questions or concerns  Subjective:      Patient ID: Jael Alonzo is a 36 y o   male presenting today for follow up for FMS  Still with widespread pain  Some swelling in the left hip and clicking and left knee  No other obvious joint swelling  +AM stiffness x few minutes  +Also with hand pain with weakness  +Difficulty with sleep due to pain  +Non restorative sleep   +fatigue  Saw psych and one medication and stopped gabapentin and mobic  Not seeing pain management and hasnt for several months  The following portions of the patient's history were reviewed and updated as appropriate: He  has a past medical history of Hemorrhoids     Review of Systems   Constitutional: Positive for fatigue  Negative for appetite change, chills, fever and unexpected weight change  HENT: Negative for congestion, mouth sores and sore throat  Eyes: Negative for pain, redness and visual disturbance  Respiratory: Positive for shortness of breath (activity )  Negative for cough and chest tightness  Cardiovascular: Positive for chest pain (intermittently )  Negative for leg swelling  Gastrointestinal: Positive for abdominal pain and constipation  Negative for blood in stool, diarrhea, nausea and vomiting  Endocrine: Negative for polydipsia and polyuria  Genitourinary: Negative for frequency and hematuria  Skin: Negative for color change and rash  Neurological: Positive for weakness (Generalized ), light-headedness (Intermittently ), numbness (Left arm and left leg) and headaches (Intermittently )  Hematological: Negative for adenopathy  Psychiatric/Behavioral: Negative for behavioral problems  The patient is not nervous/anxious  Objective:    Physical Exam   Constitutional: He is oriented to person, place, and time  He appears well-developed and well-nourished  HENT:   Head: Normocephalic     Mouth/Throat: Oropharynx is clear and moist    Eyes: Conjunctivae are normal  Pupils are equal, round, and reactive to light  Neck: Normal range of motion  Neck supple  Cardiovascular: Normal rate, regular rhythm and normal heart sounds  Pulmonary/Chest: Effort normal and breath sounds normal    Musculoskeletal:   +Tenderness of the cervical and lumbar spine  +Crepitus of the knees  Neurological: He is alert and oriented to person, place, and time  Skin: Skin is warm and dry  Psychiatric: He has a normal mood and affect   His behavior is normal        Physical Exam     Tenderness:   RUE: glenohumeral and carpometacarpal  LUE: glenohumeral and carpometacarpal  RLE: acetabulofemoral  LLE: acetabulofemoral, tibiofemoral and tibiotalar    MCGUIRE-28 tender joint count: 3  MCGUIRE-28 swollen joint count: 0      Results Reviewed     None

## 2018-08-07 DIAGNOSIS — M79.7 FIBROMYALGIA: Primary | ICD-10-CM

## 2018-08-08 DIAGNOSIS — M79.7 FIBROMYALGIA: Primary | ICD-10-CM

## 2018-08-08 RX ORDER — MELOXICAM 7.5 MG/1
7.5 TABLET ORAL 2 TIMES DAILY
Qty: 180 TABLET | Refills: 1 | Status: SHIPPED | OUTPATIENT
Start: 2018-08-08 | End: 2019-02-04

## 2018-08-08 RX ORDER — GABAPENTIN 300 MG/1
300 CAPSULE ORAL 2 TIMES DAILY
Qty: 180 CAPSULE | Refills: 0 | Status: SHIPPED | OUTPATIENT
Start: 2018-08-08 | End: 2019-05-07 | Stop reason: SDUPTHER

## 2018-08-08 RX ORDER — GABAPENTIN 100 MG/1
300 CAPSULE ORAL 2 TIMES DAILY
Refills: 0 | OUTPATIENT
Start: 2018-08-08

## 2018-09-12 ENCOUNTER — OFFICE VISIT (OUTPATIENT)
Dept: INTERNAL MEDICINE CLINIC | Facility: CLINIC | Age: 43
End: 2018-09-12
Payer: MEDICARE

## 2018-09-12 VITALS
DIASTOLIC BLOOD PRESSURE: 74 MMHG | TEMPERATURE: 98.6 F | SYSTOLIC BLOOD PRESSURE: 96 MMHG | WEIGHT: 124.34 LBS | HEART RATE: 72 BPM | HEIGHT: 64 IN | BODY MASS INDEX: 21.23 KG/M2

## 2018-09-12 DIAGNOSIS — G89.29 CHRONIC LOW BACK PAIN, UNSPECIFIED BACK PAIN LATERALITY, WITH SCIATICA PRESENCE UNSPECIFIED: Primary | ICD-10-CM

## 2018-09-12 DIAGNOSIS — M54.2 CHRONIC NECK PAIN: ICD-10-CM

## 2018-09-12 DIAGNOSIS — G89.29 CHRONIC NECK PAIN: ICD-10-CM

## 2018-09-12 DIAGNOSIS — M54.5 CHRONIC LOW BACK PAIN, UNSPECIFIED BACK PAIN LATERALITY, WITH SCIATICA PRESENCE UNSPECIFIED: Primary | ICD-10-CM

## 2018-09-12 DIAGNOSIS — Z23 NEED FOR INFLUENZA VACCINATION: ICD-10-CM

## 2018-09-12 PROCEDURE — G0008 ADMIN INFLUENZA VIRUS VAC: HCPCS | Performed by: PHYSICIAN ASSISTANT

## 2018-09-12 PROCEDURE — 99213 OFFICE O/P EST LOW 20 MIN: CPT | Performed by: PHYSICIAN ASSISTANT

## 2018-09-12 PROCEDURE — 90686 IIV4 VACC NO PRSV 0.5 ML IM: CPT | Performed by: PHYSICIAN ASSISTANT

## 2018-09-12 RX ORDER — DULOXETIN HYDROCHLORIDE 60 MG/1
CAPSULE, DELAYED RELEASE ORAL
COMMUNITY
Start: 2018-09-08 | End: 2022-05-16 | Stop reason: ALTCHOICE

## 2018-09-12 RX ORDER — BUPROPION HYDROCHLORIDE 150 MG/1
TABLET, EXTENDED RELEASE ORAL
COMMUNITY
Start: 2018-09-08 | End: 2019-05-07 | Stop reason: ALTCHOICE

## 2018-09-12 NOTE — PATIENT INSTRUCTIONS
Continue with Rheumatology  Sched with [facility] and pain management  Will need records from [facility] pain sent directly to them  Continue with [address] providers  Can try some PT again as may help some pain especially the Fibromyalgia  Flu vaccine today

## 2018-09-12 NOTE — PROGRESS NOTES
Assessment/Plan:    No problem-specific Assessment & Plan notes found for this encounter  Diagnoses and all orders for this visit:    Chronic low back pain, unspecified back pain laterality, with sciatica presence unspecified  -     Ambulatory referral to Pain Management; Future  -     Ambulatory referral to Physical Therapy; Future    Chronic neck pain  -     Ambulatory referral to Pain Management; Future  -     Ambulatory referral to Physical Therapy; Future    Need for influenza vaccination  -     SYRINGE/SINGLE-DOSE VIAL: influenza vaccine, 6634-2364, quadrivalent, 0 5 mL, preservative-free, for patients 3+ yr (FLUZONE)          Subjective:      Patient ID: Marylene Prima is a 39 y o  male  Pt here for [facility] with c/o ongoing back pain  Was seeing pain management at [facility] pain and did have injections in the past for back and neck and did not really help and afraid to get more  Did not see them recently  Also under [facility] for fibromyalgia and on gabapentin and meloxicam     Confirms pain is just as bad in neck and low back as before treatment from pain management  Patient reports major concern with the injections is after he had the epidural steroid injection his right leg was very numb and he has to walk up 2 flights of stairs to get to his apartment  Mad MRI in past Lumbar nothing significant    Patient is status post laminectomy of his cervical spine  Follow-up MRI showed stable positioning however he remained with some stenosis at C5-6  Patient not reporting upper extremity radiculopathy only lower extremity  Confirms also on cymbalta, wellbutrin 150, from [facility] provider      We discussed patient already on gabapentin and meloxicam with limited relief  Patient would like referral to [facility]  We discussed he will need to have his records from [facility] pain sent to their office as well    Patient was offered and declined a muscle relaxant as he reports has had in the past and they made him feel sleepy and made him feel more depressed  The following portions of the patient's history were reviewed and updated as appropriate: allergies, current medications, past family history, past medical history, past social history, past surgical history and problem list     Review of Systems   Constitutional: Negative for chills, fatigue, fever and unexpected weight change  Respiratory: Negative  Negative for cough, chest tightness and shortness of breath  Cardiovascular: Negative  Negative for chest pain, palpitations and leg swelling  Gastrointestinal: Negative  Negative for abdominal pain, blood in stool, constipation and diarrhea  Genitourinary: Negative  Negative for difficulty urinating, frequency and urgency  Musculoskeletal: Positive for arthralgias, back pain, myalgias, neck pain and neck stiffness  Neurological: Positive for numbness  Negative for dizziness and light-headedness  Psychiatric/Behavioral: Positive for dysphoric mood  Negative for confusion, decreased concentration, self-injury and suicidal ideas  The patient is not nervous/anxious  Objective:      BP 96/74 (BP Location: Left arm, Patient Position: Sitting, Cuff Size: Standard)   Pulse 72   Temp 98 6 °F (37 °C) (Oral)   Ht 5' 4" (1 626 m)   Wt 56 4 kg (124 lb 5 4 oz)   BMI 21 34 kg/m²          Physical Exam   Constitutional: He is oriented to person, place, and time  He appears well-developed and well-nourished  HENT:   Head: Normocephalic  Neck: Muscular tenderness present  No spinous process tenderness present  No neck rigidity  Decreased range of motion present  No edema and no erythema present  Cardiovascular: Normal rate, regular rhythm and normal heart sounds  Pulmonary/Chest: Effort normal and breath sounds normal    Abdominal: Soft  Bowel sounds are normal  There is no tenderness  There is no rebound  Musculoskeletal: He exhibits tenderness          Lumbar back: He exhibits decreased range of motion and tenderness  He exhibits no bony tenderness, no swelling and no deformity  Neurological: He is alert and oriented to person, place, and time  He displays normal reflexes  No cranial nerve deficit  He exhibits normal muscle tone  Coordination normal    Skin: Skin is warm and dry  Psychiatric: He has a normal mood and affect   His behavior is normal

## 2018-09-24 ENCOUNTER — EVALUATION (OUTPATIENT)
Dept: PHYSICAL THERAPY | Facility: CLINIC | Age: 43
End: 2018-09-24
Payer: MEDICARE

## 2018-09-24 DIAGNOSIS — G89.29 CHRONIC NECK PAIN: ICD-10-CM

## 2018-09-24 DIAGNOSIS — M54.5 CHRONIC LOW BACK PAIN, UNSPECIFIED BACK PAIN LATERALITY, WITH SCIATICA PRESENCE UNSPECIFIED: ICD-10-CM

## 2018-09-24 DIAGNOSIS — G89.29 CHRONIC LOW BACK PAIN, UNSPECIFIED BACK PAIN LATERALITY, WITH SCIATICA PRESENCE UNSPECIFIED: ICD-10-CM

## 2018-09-24 DIAGNOSIS — M54.2 CHRONIC NECK PAIN: ICD-10-CM

## 2018-09-24 PROCEDURE — 97530 THERAPEUTIC ACTIVITIES: CPT | Performed by: PHYSICAL THERAPIST

## 2018-09-24 PROCEDURE — 97162 PT EVAL MOD COMPLEX 30 MIN: CPT | Performed by: PHYSICAL THERAPIST

## 2018-09-24 PROCEDURE — G8978 MOBILITY CURRENT STATUS: HCPCS | Performed by: PHYSICAL THERAPIST

## 2018-09-24 PROCEDURE — G8979 MOBILITY GOAL STATUS: HCPCS | Performed by: PHYSICAL THERAPIST

## 2018-09-24 NOTE — PROGRESS NOTES
Physical Therapy Initial Evaluation    Today's date: 2018  Patient name: Guille Lee  : 1975  MRN: [id number]  Referring provider: Shanique Molina PA-C  Dx:   Encounter Diagnosis     ICD-10-CM    1  Chronic low back pain, unspecified back pain laterality, with sciatica presence unspecified M54 5 Ambulatory referral to Physical Therapy    G89 29    2  Chronic neck pain M54 2 Ambulatory referral to Physical Therapy    G89 29                   Assessment  Impairments: abnormal or restricted ROM, abnormal movement, activity intolerance, impaired physical strength, pain with function, poor posture  and poor body mechanics    Assessment details: Pt's problem list: c/o pain with various ADL's at home, c/o pain with amb and prolong standing, decreased ROM in LB and C-spine, poor posture, poor body mechanics, decreased MMT L LE, positive trigger points in LB/mid/upper back  Understanding of Dx/Px/POC: fair   Prognosis: fair    Goals  STG's (1-2 weeks)  1  S with postural education to improve safe ADL's at home with less discomfort  2  S with all HEP techniques  3  Increase MMT in L LE - hip/knee by 1/2 grade to improve safe amb on various surfaces  LTG's (4-6 weeks)  1  Independent with all HEP  2  Independent with all self postural techniques to max safe body mechanics after D/C   3  Independent with self pain management to avoid worsening of symptoms after D/C      Plan  Patient would benefit from: skilled physical therapy  Planned therapy interventions: strengthening, stretching, therapeutic activities, therapeutic exercise, therapeutic training, home exercise program, functional ROM exercises, flexibility, postural training, patient education and body mechanics training  Frequency: 2x week  Duration in weeks: 6  Plan of Care beginning date: 2018  Plan of Care expiration date: 10/29/2018  Treatment plan discussed with: patient        Subjective Evaluation    History of Present Illness  Date of onset: 2018  Mechanism of injury: Pt  Is 40 y/o male with hx  of CLBP for many years due to heavy lifting and carrying of object on the job in the past   Recently (2018)  pt  c/o increase discomfort in LB after multiple falls on steps at home, gradual worsening of LB pain and radic  to L LE  Functional limitations: pain at night in supine position, pain with prolong amb, c/o pain with bending FF ADL's at home, sitting and driving  Recurrent probem    Quality of life: poor    Pain  Current pain ratin  At best pain ratin  At worst pain rating: 10  Location: LBP and L LE radic  Quality: radiating, sharp and pulling  Relieving factors: medications and change in position  Aggravating factors: walking, standing, sitting and lifting  Progression: worsening    Social Support  Steps to enter house: yes (12 steps, B HR )  Stairs in house: yes (2 flights of stairs, u/l HR)   Lives in: apartment  Lives with: significant other    Employment status: not working (disability)  Hand dominance: right      Diagnostic Tests  MRI studies: abnormal (about 1year ago)  Treatments  Previous treatment: physical therapy and injection treatment  Current treatment: medication  Patient Goals  Patient goals for therapy: decreased pain and increased motion  Patient goal: To learn new exercise to help my pain  Objective     Static Posture     Head  Forward  Shoulders  Rounded  Scapulae  Right winging and right protracted  Lumbar Spine   Decreased lordosis  Palpation   Left   Trigger point to lumbar paraspinals  Right   Trigger point to lumbar paraspinals  Additional Palpation Details  C/o generalized mid-back pain as well and B/L traps/upper back pain  Active Range of Motion   Cervical/Thoracic Spine   Cervical  Subcranial protraction: WFL and with pain   Subcranial retraction: WFL and with pain   Flexion: WFL and with pain  Extension: Neck active extension: min loss   with pain  Left lateral flexion: Neck active lateral bend left: min loss  with pain  Right lateral flexion: Neck active lateral bend right: min loss  with pain  Left rotation: [facility] and with pain  Right rotation: [facility] and with pain    Lumbar   Flexion: WFL and with pain  Extension: Active lumbar extension: mod loss  with pain  Left lateral flexion: Active left lumbar lateral flexion: min loss  with pain  Right lateral flexion: Active right lumbar lateral flexion: min loss  with pain  Left rotation: Active left lumbar rotation: min loss  Right rotation: Active right lumbar rotation: min loss  with pain    Additional Active Range of Motion Details  C/o generalized discomfort with all test movements t/o  Self-limiting due to pain  Strength/Myotome Testing     Additional Strength Details  MMT:   R LE 5/5 t/o grossly tested  No limitations  L LE 3+/5 at hip, knee 4/5 t/o and ankle 4/5 t/o - due to LBP and LE radic  Tests     Lumbar   Positive repeated flexion and repeated extension  Left   Positive passive SLR  Right   Positive passive SLR  General Comments     Cervical/Thoracic Comments  Hx  of Cervical fusion C5-6 about 7 years ago  C/o generalized neck pain and on/off R UE radic to hand  Functional limitations: dressing, bathing, use of UE's with ADL's at home  Palpation: positive trigger points in B/L traps and upper/mid back paraspinals            Precautions: C-fusion, anxiety, bipolar    Daily Treatment Diary     Manual  9/24                                                                                 Exercise Diary  9/24            Postural edu/position of comfort  8'            Supine PPT                                                                                                                                                                                                                                                           Modalities  9/24            MH supine w tx np

## 2018-09-28 ENCOUNTER — OFFICE VISIT (OUTPATIENT)
Dept: PHYSICAL THERAPY | Facility: CLINIC | Age: 43
End: 2018-09-28
Payer: MEDICARE

## 2018-09-28 DIAGNOSIS — M54.5 CHRONIC LOW BACK PAIN, UNSPECIFIED BACK PAIN LATERALITY, WITH SCIATICA PRESENCE UNSPECIFIED: Primary | ICD-10-CM

## 2018-09-28 DIAGNOSIS — G89.29 CHRONIC LOW BACK PAIN, UNSPECIFIED BACK PAIN LATERALITY, WITH SCIATICA PRESENCE UNSPECIFIED: Primary | ICD-10-CM

## 2018-09-28 PROCEDURE — 97110 THERAPEUTIC EXERCISES: CPT | Performed by: PHYSICAL THERAPIST

## 2018-09-28 PROCEDURE — 97530 THERAPEUTIC ACTIVITIES: CPT | Performed by: PHYSICAL THERAPIST

## 2018-09-28 NOTE — PROGRESS NOTES
Daily Note     Today's date: 2018  Patient name: Cecilia Brown  : 1975  MRN: [id number]  Referring provider: Orpah Bence, PA-C  Dx:   Encounter Diagnosis     ICD-10-CM    1  Chronic low back pain, unspecified back pain laterality, with sciatica presence unspecified M54 5     G89 29                   Subjective: "I feel the same  Nothing changed  I have 5/10 back pain today "      Objective: See treatment diary below  Precautions: C-fusion, anxiety, bipolar     Daily Treatment Diary      Manual                                                                                                                                                   Exercise Diary                     Postural edu/position of comfort  8'  8'                   Supine PPT/TA   10x2,5"                    PPT/TA  w SLR/supine    10x2,5"                    PPT/TA/Bridging    10x,5"                    LTR    10x5"                    PPT/TA/RFIL    10x5"                                                                                                                                                                                                                                                                                                                                                                         Modalities                     MH supine w tx np  10'                                                                          Assessment: Tolerated treatment well  Patient was educated on simple stabilization techniques in safe range/pain free  Pt  was given and reviewed HEP  No worsening of discomfort after tx  noted  Plan: Continue as per ongoing POC

## 2018-10-01 ENCOUNTER — OFFICE VISIT (OUTPATIENT)
Dept: PHYSICAL THERAPY | Facility: CLINIC | Age: 43
End: 2018-10-01
Payer: MEDICARE

## 2018-10-01 DIAGNOSIS — M54.5 CHRONIC LOW BACK PAIN, UNSPECIFIED BACK PAIN LATERALITY, WITH SCIATICA PRESENCE UNSPECIFIED: Primary | ICD-10-CM

## 2018-10-01 DIAGNOSIS — G89.29 CHRONIC LOW BACK PAIN, UNSPECIFIED BACK PAIN LATERALITY, WITH SCIATICA PRESENCE UNSPECIFIED: Primary | ICD-10-CM

## 2018-10-01 PROCEDURE — 97110 THERAPEUTIC EXERCISES: CPT | Performed by: PHYSICAL THERAPIST

## 2018-10-01 PROCEDURE — 97530 THERAPEUTIC ACTIVITIES: CPT | Performed by: PHYSICAL THERAPIST

## 2018-10-01 NOTE — PROGRESS NOTES
Daily Note     Today's date: 10/1/2018  Patient name: Hank Wong  : 1975  MRN: [id number]  Referring provider: Nancy Milligan PA-C  Dx:   Encounter Diagnosis     ICD-10-CM    1  Chronic low back pain, unspecified back pain laterality, with sciatica presence unspecified M54 5     G89 29                   Subjective: "Not so bad today  5/10 LBP today "      Objective: See treatment diary below    Precautions: C-fusion, anxiety, bipolar     Daily Treatment Diary      Manual  9/24 9/28  10/1                                                                                                                                               Exercise Diary  9/24  9/28  10/1                 Postural edu/position of comfort  8'  8'  3'                 Supine PPT/TA   10x2,5"  10x2,5"                  PPT/TA  w SLR/supine    10x2,5"  10x2,5"                  PPT/TA/Bridging    10x,5"  10x2,5"                  LTR    10x5"  10x5"                  PPT/TA/RFIL    10x5"  10x5"                                          t-ball wall slides      10x2                  facing t-ball, trunk ext as ariana       15x                                                                                                                                                                                                                                                                                               Modalities  9/24  9/28  10/1                  supine w tx np  10'  10'                                                                         Assessment: Tolerated treatment well  Continue to work on pain-free core/back strengthening therex with postural correction  Patient is motivated, some verbal and tactile cues needed for safe ex  Techniques  No worsening of pain after tx  Plan: Continue PT as per POC

## 2018-10-05 ENCOUNTER — OFFICE VISIT (OUTPATIENT)
Dept: PHYSICAL THERAPY | Facility: CLINIC | Age: 43
End: 2018-10-05
Payer: MEDICARE

## 2018-10-05 DIAGNOSIS — G89.29 CHRONIC LOW BACK PAIN, UNSPECIFIED BACK PAIN LATERALITY, WITH SCIATICA PRESENCE UNSPECIFIED: Primary | ICD-10-CM

## 2018-10-05 DIAGNOSIS — M54.5 CHRONIC LOW BACK PAIN, UNSPECIFIED BACK PAIN LATERALITY, WITH SCIATICA PRESENCE UNSPECIFIED: Primary | ICD-10-CM

## 2018-10-05 PROCEDURE — 97110 THERAPEUTIC EXERCISES: CPT | Performed by: PHYSICAL THERAPIST

## 2018-10-05 PROCEDURE — 97140 MANUAL THERAPY 1/> REGIONS: CPT | Performed by: PHYSICAL THERAPIST

## 2018-10-05 NOTE — PROGRESS NOTES
Daily Note     Today's date: 10/5/2018  Patient name: Hank Wong  : 1975  MRN: [id number]  Referring provider: Nancy Milligan PA-C  Dx:   Encounter Diagnosis     ICD-10-CM    1  Chronic low back pain, unspecified back pain laterality, with sciatica presence unspecified M54 5     G89 29                   Subjective: "Not so bad today  Just about 4/10 in my back "        Objective: See treatment diary below    Precautions: C-fusion, anxiety, bipolar     Daily Treatment Diary      Manual  9/24 9/28  10/1  10/5                LS to LB        4'                TPR/LB        5'                                                                                             Exercise Diary  9/24  9/28  10/1  10/5               Postural edu/position of comfort  8'  8'  3'  3'               Supine PPT/TA   10x2,5"  10x2,5"                  PPT/TA  w SLR/supine    10x2,5"  10x2,5"                  PPT/TA/Bridging    10x,5"  10x2,5"                  LTR    10x5"  10x5"                  PPT/TA/RFIL    10x5"  10x5"                                          t-ball wall slides      10x2  10x2                facing t-ball, trunk ext as ariana       15x  15x                UBE/posture/b'kwrd        10'                Apollo scap  retr         L2,20x                Bike/upright posture        10'                                                                                                                                                                                                                     Modalities  9/24  9/28  10/1  10               MH supine w tx np  10'  10'                                             Assessment: Tolerated treatment well  Some decrease in LBP after tx to 3/10  Patient was educated on importance of good postural awareness and daily HEP  Plan: Continue PT as ariana

## 2018-10-08 ENCOUNTER — OFFICE VISIT (OUTPATIENT)
Dept: PHYSICAL THERAPY | Facility: CLINIC | Age: 43
End: 2018-10-08
Payer: MEDICARE

## 2018-10-08 DIAGNOSIS — G89.29 CHRONIC LOW BACK PAIN, UNSPECIFIED BACK PAIN LATERALITY, WITH SCIATICA PRESENCE UNSPECIFIED: Primary | ICD-10-CM

## 2018-10-08 DIAGNOSIS — M54.5 CHRONIC LOW BACK PAIN, UNSPECIFIED BACK PAIN LATERALITY, WITH SCIATICA PRESENCE UNSPECIFIED: Primary | ICD-10-CM

## 2018-10-08 PROCEDURE — 97530 THERAPEUTIC ACTIVITIES: CPT | Performed by: PHYSICAL THERAPIST

## 2018-10-08 PROCEDURE — 97110 THERAPEUTIC EXERCISES: CPT | Performed by: PHYSICAL THERAPIST

## 2018-10-08 NOTE — PROGRESS NOTES
Daily Note     Today's date: 10/8/2018  Patient name: Gia Odell  : 1975  MRN: [id number]  Referring provider: Akbar Garcia PA-C  Dx:   Encounter Diagnosis     ICD-10-CM    1  Chronic low back pain, unspecified back pain laterality, with sciatica presence unspecified M54 5     G89 29                   Subjective: No new discomfort or changes in symptoms verbalized  Objective: See treatment diary below  Precautions: C-fusion, anxiety, bipolar     Daily Treatment Diary      Manual  9/24 9/28  10/1  10/5  10/8              LS to LB        4'                TPR/LB        5'  5'                                                                                           Exercise Diary  9/24  9/28  10/1  10/5  10/8             Postural edu/position of comfort  8'  8'  3'  3'               Supine PPT/TA   10x2,5"  10x2,5"                  PPT/TA  w SLR/supine    10x2,5"  10x2,5"                  PPT/TA/Bridging    10x,5"  10x2,5"                  LTR    10x5"  10x5"                  PPT/TA/RFIL    10x5"  10x5"                                          t-ball wall slides      10x2  10x2  15x2              facing t-ball, trunk ext as ariana       15x  15x                UBE/posture/b'kwrd/L roll        10'  10'              Apollo scap  retr         L2,20x  L3,20x              Bike/upright posture        10'  5'              prone trunk ext          10x3"              prone U/L LE lift         10x3"              Apollo chest pulls         L3,20x                                                                                                                                           Modalities  9/24  9/28  10/1  10/5  10/8             MH supine w tx np  10'  10'  NP  NP                                            Assessment: Tolerated treatment well, more therex intesity ariana well  Patient without worsening of discomfort after tx  Better postural awareness noted with therex  Safe with all ex  techniques  Plan: Continue PT as per POC

## 2018-10-12 ENCOUNTER — OFFICE VISIT (OUTPATIENT)
Dept: PHYSICAL THERAPY | Facility: CLINIC | Age: 43
End: 2018-10-12
Payer: MEDICARE

## 2018-10-12 DIAGNOSIS — G89.29 CHRONIC LOW BACK PAIN, UNSPECIFIED BACK PAIN LATERALITY, WITH SCIATICA PRESENCE UNSPECIFIED: Primary | ICD-10-CM

## 2018-10-12 DIAGNOSIS — M54.5 CHRONIC LOW BACK PAIN, UNSPECIFIED BACK PAIN LATERALITY, WITH SCIATICA PRESENCE UNSPECIFIED: Primary | ICD-10-CM

## 2018-10-12 PROCEDURE — 97110 THERAPEUTIC EXERCISES: CPT | Performed by: PHYSICAL THERAPIST

## 2018-10-12 PROCEDURE — 97530 THERAPEUTIC ACTIVITIES: CPT | Performed by: PHYSICAL THERAPIST

## 2018-10-12 NOTE — PROGRESS NOTES
Daily Note     Today's date: 10/12/2018  Patient name: Ashley Shankar  : 1975  MRN: [id number]  Referring provider: Jony Arteaga PA-C  Dx:   Encounter Diagnosis     ICD-10-CM    1  Chronic low back pain, unspecified back pain laterality, with sciatica presence unspecified M54 5     G89 29                   Subjective: "I am OK today, but still on/off some back pain at home  I had this for a long time, so I know it will take long time "      Objective: See treatment diary below  Precautions: C-fusion, anxiety, bipolar     Daily Treatment Diary      Manual  9/24 9/28  10/1  10/5  10/8  10/12            LS to LB        4'                TPR/LB        5'  5'  5'                                                                                         Exercise Diary  9/24  9/28  10/1  10/5  10/8 10/12           Postural edu/position of comfort  8'  8'  3'  3'    3'           Supine PPT/TA   10x2,5"  10x2,5"                  PPT/TA  w SLR/supine    10x2,5"  10x2,5"                  PPT/TA/Bridging    10x,5"  10x2,5"                  LTR    10x5"  10x5"                  PPT/TA/RFIL    10x5"  10x5"                                          t-ball wall slides      10x2  10x2  15x2              facing t-ball, trunk ext as ariana       15x  15x                UBE/posture/b'kwrd/L roll        10'  10'  10'            Apollo scap  retr         L2,20x  L3,20x L3,15x2            Bike/upright posture        10'  5'  10'            prone trunk ext          10x3"  15x3"            prone U/L LE lift         10x3"  15x3"            Apollo chest pulls         L3,20x  L0,84C7           T'dmil/posture            8' at 1  7mph                                                                                                                 Modalities  9/24  9/28  10/1  10/5  10/8  10/12           MH supine w tx np  10'  10'  NP  NP  NP                                          Assessment: Tolerated treatment very well   Patient shows improvement in self postural correction with various therex  No discomfort worsen after tx  Still limitation in AROM LB extension - mod loss, rest min loss noted  Plan: Continue PT as ariana

## 2018-10-15 ENCOUNTER — APPOINTMENT (OUTPATIENT)
Dept: PHYSICAL THERAPY | Facility: CLINIC | Age: 43
End: 2018-10-15
Payer: MEDICARE

## 2018-10-19 ENCOUNTER — OFFICE VISIT (OUTPATIENT)
Dept: PHYSICAL THERAPY | Facility: CLINIC | Age: 43
End: 2018-10-19
Payer: MEDICARE

## 2018-10-19 DIAGNOSIS — M54.5 CHRONIC LOW BACK PAIN, UNSPECIFIED BACK PAIN LATERALITY, WITH SCIATICA PRESENCE UNSPECIFIED: Primary | ICD-10-CM

## 2018-10-19 DIAGNOSIS — G89.29 CHRONIC LOW BACK PAIN, UNSPECIFIED BACK PAIN LATERALITY, WITH SCIATICA PRESENCE UNSPECIFIED: Primary | ICD-10-CM

## 2018-10-19 PROCEDURE — 97530 THERAPEUTIC ACTIVITIES: CPT | Performed by: PHYSICAL THERAPIST

## 2018-10-19 PROCEDURE — 97110 THERAPEUTIC EXERCISES: CPT | Performed by: PHYSICAL THERAPIST

## 2018-10-19 NOTE — PROGRESS NOTES
Daily Note     Today's date: 10/19/2018  Patient name: David Barakat  : 1975  MRN: [id number]  Referring provider: Drea Mason PA-C  Dx:   Encounter Diagnosis     ICD-10-CM    1  Chronic low back pain, unspecified back pain laterality, with sciatica presence unspecified M54 5     G89 29                   Subjective: "I am hurting all over my body  I am so depressed, not because of my back, but because of my personal life  I am going to see my therapist right after I leave here  Today is just not a good day for me, but I still want to do my exercises " c/o 6/10 BP today  Objective: See treatment diary below    Precautions: C-fusion, anxiety, bipolar     Daily Treatment Diary      Manual  9/24 9/28  10/1  10/5  10/8  10/12  10/19          LS to LB        4'                TPR/LB        5'  5'  5'  refused                                                                                       Exercise Diary  9/24  9/28  10/1  10/5  10/8 10/12 10/19         Postural edu/position of comfort  8'  8'  3'  3'    3'           Supine PPT/TA   10x2,5"  10x2,5"        15x2,5"          PPT/TA  w SLR/supine    10x2,5"  10x2,5"                  PPT/TA/Bridging    10x,5"  10x2,5"                  LTR    10x5"  10x5"        15x5"          PPT/TA/RFIL    10x5"  10x5"                  u/l knee to chest stretch              10x2,10"          t-ball wall slides      10x2  10x2  15x2              facing t-ball, trunk ext as ariana       15x  15x                UBE/posture/b'kwrd/L roll        10'  10'  10'  10'          Apollo scap  retr         L2,20x  L3,20x L3,15x2            Bike/upright posture        10'  5'  10'  10'          prone trunk ext          10x3"  15x3"  20x3"          prone U/L LE lift         10x3"  15x3"  20x3"          Apollo chest pulls         L3,20x  V0,81S2           T'dmil/posture            8' at 1  7mph                                                                                                                 Modalities  9/24  9/28  10/1  10/5  10/8  10/12  10/19         MH supine w tx np  10'  10'  NP  NP  NP  NP                                     Assessment: Tolerated treatment well  Patient is able to perform therex safely  C/o same discomfort - no change  Postural awareness improved with various ex  techniques/verbal and tactile cues  Plan: Continue PT tx  as ariana

## 2018-10-22 ENCOUNTER — OFFICE VISIT (OUTPATIENT)
Dept: PHYSICAL THERAPY | Facility: CLINIC | Age: 43
End: 2018-10-22
Payer: MEDICARE

## 2018-10-22 DIAGNOSIS — G89.29 CHRONIC LOW BACK PAIN, UNSPECIFIED BACK PAIN LATERALITY, WITH SCIATICA PRESENCE UNSPECIFIED: Primary | ICD-10-CM

## 2018-10-22 DIAGNOSIS — M54.5 CHRONIC LOW BACK PAIN, UNSPECIFIED BACK PAIN LATERALITY, WITH SCIATICA PRESENCE UNSPECIFIED: Primary | ICD-10-CM

## 2018-10-22 PROCEDURE — 97110 THERAPEUTIC EXERCISES: CPT | Performed by: PHYSICAL THERAPIST

## 2018-10-22 PROCEDURE — G8980 MOBILITY D/C STATUS: HCPCS | Performed by: PHYSICAL THERAPIST

## 2018-10-22 PROCEDURE — G8979 MOBILITY GOAL STATUS: HCPCS | Performed by: PHYSICAL THERAPIST

## 2018-10-22 PROCEDURE — 97530 THERAPEUTIC ACTIVITIES: CPT | Performed by: PHYSICAL THERAPIST

## 2018-10-22 NOTE — PROGRESS NOTES
Daily Note/Discharge Note    Today's date: 10/22/2018  Patient name: Blanco Osorio  : 1975  MRN: [id number]  Referring provider: Rimma Buck PA-C  Dx:   Encounter Diagnosis     ICD-10-CM    1  Chronic low back pain, unspecified back pain laterality, with sciatica presence unspecified M54 5     G89 29                   Subjective: "My back is better today  But I will make today's session my last one, because [place] "      Objective: See treatment diary below    Pain: 0/10 today, but on/off c/o same discomfort with certain ADL's at home  Posture: Good in sitting and standing  HEP: Independent with all techniques  LB ROM: Back extension - mod loss, rest [facility] t/o  Precautions: C-fusion, anxiety, bipolar     Daily Treatment Diary      Manual  9/24 9/28  10/1  10/5  10/8  10/12  10/19  10/22        LS to LB        4'                TPR/LB        5'  5'  5'  refused                                                                                       Exercise Diary  9/24  9/28  10/1  10/5  10/8 10/12 10/19  10/22       Postural edu/position of comfort  8'  8'  3'  3'    3'    3'       Supine PPT/TA   10x2,5"  10x2,5"        15x2,5"          PPT/TA  w SLR/supine    10x2,5"  10x2,5"                  PPT/TA/Bridging    10x,5"  10x2,5"                  LTR    10x5"  10x5"        15x5"          PPT/TA/RFIL    10x5"  10x5"                  u/l knee to chest stretch              10x2,10"          t-ball wall slides      10x2  10x2  15x2              facing t-ball, trunk ext as ariana       15x  15x                UBE/posture/b'kwrd/L roll        10'  10'  10'  10'  10'        Apollo scap  retr         L2,20x  L3,20x L3,15x2    L3,15x2        Bike/upright posture        10'  5'  10'  10'  Recom  w L-roll,10'        prone trunk ext          10x3"  15x3"  20x3"          prone U/L LE lift         10x3"  15x3"  20x3"          Apollo chest pulls         L3,20x  C8,64B4    F1,73O1       T'dmil/posture            8' at 1  7mph                                    Re-assessment/HEW reiew                8'                                                             Modalities  9/24  9/28  10/1  10/5  10/8  10/12  10/19         MH supine w tx np  10'  10'  NP  NP  NP  NP                                      LTG's (4-6 weeks) From IE on 9/24:  1  Independent with all HEP (reached)  2  Independent with all self postural techniques to max safe body mechanics after D/C  (reached)  3  Independent with self pain management to avoid worsening of symptoms after D/C  (not reached, pt  is scheduled to follow up with pain management for pain control)    Assessment: Tolerated treatment well  Patient is independent and safe with all HEP, met all LTG's at this point, still c/o on/off same LBP (will follow up with PCP and pain management)  Plan: Self D/C from PT at this time due to a trip out of the country

## 2018-10-26 ENCOUNTER — APPOINTMENT (OUTPATIENT)
Dept: PHYSICAL THERAPY | Facility: CLINIC | Age: 43
End: 2018-10-26
Payer: MEDICARE

## 2018-10-29 ENCOUNTER — APPOINTMENT (OUTPATIENT)
Dept: PHYSICAL THERAPY | Facility: CLINIC | Age: 43
End: 2018-10-29
Payer: MEDICARE

## 2019-05-07 ENCOUNTER — OFFICE VISIT (OUTPATIENT)
Dept: INTERNAL MEDICINE CLINIC | Facility: CLINIC | Age: 44
End: 2019-05-07

## 2019-05-07 ENCOUNTER — APPOINTMENT (OUTPATIENT)
Dept: LAB | Facility: CLINIC | Age: 44
End: 2019-05-07
Payer: MEDICARE

## 2019-05-07 ENCOUNTER — TELEPHONE (OUTPATIENT)
Dept: INTERNAL MEDICINE CLINIC | Facility: CLINIC | Age: 44
End: 2019-05-07

## 2019-05-07 VITALS
WEIGHT: 125.22 LBS | HEIGHT: 64 IN | TEMPERATURE: 97.5 F | SYSTOLIC BLOOD PRESSURE: 96 MMHG | BODY MASS INDEX: 21.38 KG/M2 | DIASTOLIC BLOOD PRESSURE: 80 MMHG | HEART RATE: 64 BPM

## 2019-05-07 DIAGNOSIS — M79.7 FIBROMYALGIA: ICD-10-CM

## 2019-05-07 DIAGNOSIS — F31.9 BIPOLAR DISEASE, CHRONIC (HCC): ICD-10-CM

## 2019-05-07 DIAGNOSIS — G89.29 CHRONIC BILATERAL LOW BACK PAIN WITH LEFT-SIDED SCIATICA: Chronic | ICD-10-CM

## 2019-05-07 DIAGNOSIS — K64.8 OTHER HEMORRHOIDS: Chronic | ICD-10-CM

## 2019-05-07 DIAGNOSIS — K59.04 CHRONIC IDIOPATHIC CONSTIPATION: Chronic | ICD-10-CM

## 2019-05-07 DIAGNOSIS — E78.00 PURE HYPERCHOLESTEROLEMIA: Primary | Chronic | ICD-10-CM

## 2019-05-07 DIAGNOSIS — N50.819 TESTICULAR PAIN: Chronic | ICD-10-CM

## 2019-05-07 DIAGNOSIS — M54.42 CHRONIC BILATERAL LOW BACK PAIN WITH LEFT-SIDED SCIATICA: Chronic | ICD-10-CM

## 2019-05-07 LAB
ALBUMIN SERPL BCP-MCNC: 4.2 G/DL (ref 3.5–5)
ALP SERPL-CCNC: 53 U/L (ref 46–116)
ALT SERPL W P-5'-P-CCNC: 31 U/L (ref 12–78)
ANION GAP SERPL CALCULATED.3IONS-SCNC: 3 MMOL/L (ref 4–13)
AST SERPL W P-5'-P-CCNC: 23 U/L (ref 5–45)
BILIRUB SERPL-MCNC: 0.64 MG/DL (ref 0.2–1)
BUN SERPL-MCNC: 23 MG/DL (ref 5–25)
CALCIUM SERPL-MCNC: 9.4 MG/DL (ref 8.3–10.1)
CHLORIDE SERPL-SCNC: 105 MMOL/L (ref 100–108)
CHOLEST SERPL-MCNC: 168 MG/DL (ref 50–200)
CO2 SERPL-SCNC: 31 MMOL/L (ref 21–32)
CREAT SERPL-MCNC: 1.11 MG/DL (ref 0.6–1.3)
GFR SERPL CREATININE-BSD FRML MDRD: 80 ML/MIN/1.73SQ M
GLUCOSE P FAST SERPL-MCNC: 91 MG/DL (ref 65–99)
HDLC SERPL-MCNC: 42 MG/DL (ref 40–60)
LDLC SERPL CALC-MCNC: 106 MG/DL (ref 0–100)
POTASSIUM SERPL-SCNC: 4.4 MMOL/L (ref 3.5–5.3)
PROT SERPL-MCNC: 8.1 G/DL (ref 6.4–8.2)
SODIUM SERPL-SCNC: 139 MMOL/L (ref 136–145)
TRIGL SERPL-MCNC: 102 MG/DL

## 2019-05-07 PROCEDURE — 80053 COMPREHEN METABOLIC PANEL: CPT | Performed by: PHYSICIAN ASSISTANT

## 2019-05-07 PROCEDURE — 99213 OFFICE O/P EST LOW 20 MIN: CPT | Performed by: INTERNAL MEDICINE

## 2019-05-07 PROCEDURE — 80061 LIPID PANEL: CPT | Performed by: PHYSICIAN ASSISTANT

## 2019-05-07 PROCEDURE — 36415 COLL VENOUS BLD VENIPUNCTURE: CPT | Performed by: PHYSICIAN ASSISTANT

## 2019-05-07 RX ORDER — QUETIAPINE FUMARATE 400 MG/1
300 TABLET, FILM COATED ORAL
COMMUNITY
Start: 2019-02-21

## 2019-05-07 RX ORDER — POLYETHYLENE GLYCOL 3350 17 G/17G
17 POWDER, FOR SOLUTION ORAL DAILY
Qty: 500 G | Refills: 5 | Status: SHIPPED | OUTPATIENT
Start: 2019-05-07 | End: 2019-07-30

## 2019-05-07 RX ORDER — GABAPENTIN 300 MG/1
300 CAPSULE ORAL 2 TIMES DAILY
Qty: 180 CAPSULE | Refills: 1 | Status: SHIPPED | OUTPATIENT
Start: 2019-05-07 | End: 2020-07-18

## 2019-05-07 RX ORDER — QUETIAPINE FUMARATE 300 MG/1
TABLET, FILM COATED ORAL
COMMUNITY
Start: 2019-03-14 | End: 2019-05-07 | Stop reason: ALTCHOICE

## 2019-05-07 RX ORDER — LAMOTRIGINE 100 MG/1
TABLET ORAL
COMMUNITY
Start: 2019-03-14 | End: 2019-05-07 | Stop reason: ALTCHOICE

## 2019-05-07 RX ORDER — DULOXETIN HYDROCHLORIDE 30 MG/1
CAPSULE, DELAYED RELEASE ORAL
Refills: 1 | COMMUNITY
Start: 2019-03-29 | End: 2019-05-07 | Stop reason: ALTCHOICE

## 2019-05-07 RX ORDER — LAMOTRIGINE 25 MG/1
50 TABLET ORAL DAILY
COMMUNITY
Start: 2019-02-21

## 2019-05-08 ENCOUNTER — HOSPITAL ENCOUNTER (OUTPATIENT)
Dept: RADIOLOGY | Facility: HOSPITAL | Age: 44
Discharge: HOME | End: 2019-05-08
Payer: MEDICARE

## 2019-05-08 DIAGNOSIS — N50.819 TESTICULAR PAIN: Chronic | ICD-10-CM

## 2019-05-08 PROCEDURE — 76870 US EXAM SCROTUM: CPT

## 2019-05-09 ENCOUNTER — TELEPHONE (OUTPATIENT)
Dept: INTERNAL MEDICINE CLINIC | Facility: CLINIC | Age: 44
End: 2019-05-09

## 2019-05-10 DIAGNOSIS — N50.819 TESTICULAR PAIN: Primary | Chronic | ICD-10-CM

## 2019-05-13 ENCOUNTER — TELEPHONE (OUTPATIENT)
Dept: UROLOGY | Facility: AMBULATORY SURGERY CENTER | Age: 44
End: 2019-05-13

## 2019-06-12 ENCOUNTER — OFFICE VISIT (OUTPATIENT)
Dept: UROLOGY | Facility: AMBULATORY SURGERY CENTER | Age: 44
End: 2019-06-12
Payer: MEDICARE

## 2019-06-12 VITALS
DIASTOLIC BLOOD PRESSURE: 70 MMHG | HEART RATE: 59 BPM | BODY MASS INDEX: 21.17 KG/M2 | WEIGHT: 124 LBS | HEIGHT: 64 IN | SYSTOLIC BLOOD PRESSURE: 122 MMHG

## 2019-06-12 DIAGNOSIS — N50.819 TESTICULAR PAIN: Chronic | ICD-10-CM

## 2019-06-12 DIAGNOSIS — I86.1 VARICOCELE: Primary | ICD-10-CM

## 2019-06-12 PROCEDURE — 99204 OFFICE O/P NEW MOD 45 MIN: CPT | Performed by: UROLOGY

## 2019-06-17 ENCOUNTER — CONSULT (OUTPATIENT)
Dept: MULTI SPECIALTY CLINIC | Facility: CLINIC | Age: 44
End: 2019-06-17

## 2019-06-17 VITALS
HEART RATE: 68 BPM | HEIGHT: 64 IN | BODY MASS INDEX: 21.6 KG/M2 | WEIGHT: 126.54 LBS | SYSTOLIC BLOOD PRESSURE: 116 MMHG | TEMPERATURE: 98 F | DIASTOLIC BLOOD PRESSURE: 74 MMHG

## 2019-06-17 DIAGNOSIS — K64.8 OTHER HEMORRHOIDS: Chronic | ICD-10-CM

## 2019-06-17 PROCEDURE — 46600 DIAGNOSTIC ANOSCOPY SPX: CPT | Performed by: COLON & RECTAL SURGERY

## 2019-06-17 PROCEDURE — 99202 OFFICE O/P NEW SF 15 MIN: CPT | Performed by: COLON & RECTAL SURGERY

## 2019-06-17 RX ORDER — LAMOTRIGINE 100 MG/1
100 TABLET ORAL
Refills: 0 | COMMUNITY
Start: 2019-06-12 | End: 2020-01-02

## 2019-06-17 RX ORDER — MELOXICAM 7.5 MG/1
7.5 TABLET ORAL 2 TIMES DAILY
Refills: 1 | COMMUNITY
Start: 2019-06-12 | End: 2022-05-16 | Stop reason: ALTCHOICE

## 2019-06-17 RX ORDER — DULOXETIN HYDROCHLORIDE 30 MG/1
30 CAPSULE, DELAYED RELEASE ORAL EVERY MORNING
Refills: 0 | COMMUNITY
Start: 2019-06-12 | End: 2020-01-02

## 2019-07-26 NOTE — PATIENT INSTRUCTIONS
Ligadura de Vena Espermática   LO QUE NECESITA SABER:   ¿Qué necesito saber acerca de la ligadura de vena espermática? Sesay Bristle de vena espermática es irma cirugía para reparar un varicocele  Sesay Bristle de vena espermática ayuda a mejorar el flujo sanguíneo y a disminuir la inflamación de las venas  Volo ayuda a mejorar el flujo de esperma y a mejorar la fertilidad  ¿Cómo me preparo para irma ligadura de vena espermática? Richardson médico le explicará cómo debe prepararse para la [facility]  Le puede indicar que no consuma ningún alimento ni bebida después de la medianoche del día de la [facility]  Lou Hess qué medicamentos puede silviano el día de la [facility]  ¿Qué sucederá hamida irma ligadura de vena espermática? Pueden administrarle anestesia general para mantenerlo dormido y [place] [facility]  Es posible que en cambio le administren anestesia local para adormecer el área de la [facility]  Con la anestesia local, usted todavía podría sentir presión o molestia hamida la cirugía, maxwell no debería sentir ningún dolor  Richardson cirujano le cortará o [address] [place] venas bloqueadas y dilatadas  Le revisará el flujo de melisa y cerrará la incisión con puntos de sutura  También podría colocarle cinta médica o un vendaje en richardson lugar  ¿Cuáles son los riesgos de irma ligadura de vena espermática? También es posible que le coloquen cinta médica o irma venda  ¿Cuáles son los riesgos de la ligadura de la vena espermática? Es posible que usted melisa más de lo esperado o que desarrolle irma infección  Richardson varicocele podría no desaparecer o podría regresar  Usted podría presentar inflamación en richardson escroto, alrededor del testículo  Sarah testículos podrían disminuir en tamaño y richardson fertilidad podía disminuir  Es posible que se le inflame el escroto, alrededor del tésticulo  Puede disminuir el tamaño de los testículos y puede disminuir richardson fertilidad  El dolor puede regresar     ACUERDOS SOBRE RICHARDSON CUIDADO:   Usted tiene el derecho de ayudar a planear crenshaw cuidado  Aprenda todo lo que pueda sobre crenshaw condición y hilda darle tratamiento  Discuta will opciones de tratamiento con will médicos para decidir el cuidado que usted desea recibir  Usted siempre tiene el derecho de rechazar el tratamiento  Esta información es sólo para uso en educación  Crenshaw intención no es darle un consejo médico sobre enfermedades o tratamientos  Colsulte con crenshaw [employer] farmacéutico antes de seguir cualquier régimen médico para saber si es seguro y efectivo para usted  © 2017 [address] Information is for End User's use only and may not be sold, redistributed or otherwise used for commercial purposes  All illustrations and images included in CareNotes® are the copyrighted property of [employer]  or [employer]ron [employer]

## 2019-07-26 NOTE — PROGRESS NOTES
Problem List Items Addressed This Visit        Endocrine    Hypogonadism male - Primary    Relevant Orders    Case request operating room: VARICOCELECTOMY (Completed)       Cardiovascular and Mediastinum    Left varicocele    Relevant Orders    Case request operating room: VARICOCELECTOMY (Completed)       Other    Testicular pain (Chronic)    Relevant Orders    Case request operating room: VARICOCELECTOMY (Completed)          Discussion:  I had a nice visit with Conception Gunning today  We discussed the pre, sridevi, and post operative care for left subinguinal varicocelectomy  We did talk about the potential risks of scrotal swelling, hydrocele, the chance that his pain persists after surgery, and the risks of infection, bleeding, pain, damage to surrounding structures, need for additional procedures, risk of failure of the procedure, risk of anesthesia, risk of positioning complications including neurapraxia, chronic pain, and paralysis as well as risk of rhabdomyolysis and compartment syndrome  Risk of deep venous thrombosis and venous thromboembolism as well as pneumonia and other potential unpredictable complications were also discussed with the patient  We spoke about the alternatives as well in terms of not performing surgery, he does wish to proceed with left subinguinal varicocelectomy  Indications for this operation include his discomfort, the presence of a varicocele, and his reported history of hypogonadism  There is emergent literature stating that repair of unilateral varicocele can also help to improve not only semen parameters but also hormone levels  Please note that today's interview him evaluation and management was conducted in the patient's stated preferred language of Chinese  Of note, he does speaking left as well      Informed consent was obtained today, we will proceed to the operating room in the coming weeks      Assessment and plan:       Please see problem oriented charting for the assessment plan of today's urological complaints    Chelo Alvarado MD      Chief Complaint     Chief Complaint   Patient presents with    varicocele    Left orchialgia      History of Present Illness     Saumya Ndiaye is a 39 y o  Irish-speaking gentleman referred to me in consultation by my colleague, Dr Lili Espinosa for left varicocele  With regard to this complaint it is localized to the left testis  The quality is described as aching and annoying discomfort and the severity of this complaint is described as mild to moderate  These symptoms have been present for years and the timing is ongoing  Previous treatments include supportive therapies and previous work-up includes evaluation by my colleague, Dr Lili Espinosa, in the office  The patient mentions nothing particular as aggravating and alleviating factors, respectively  The following associated signs and symptoms are mentioned:  Discomfort and pressure in the scrotum  Reviewed with him surgical approaches to treatment of left-sided varicocele, and after this discussion the patient wishes to  proceed to surgical management of left varicocelectomy    The following portions of the patient's history were reviewed and updated as appropriate: allergies, current medications, past family history, past medical history, past social history, past surgical history and problem list     Detailed Urologic History     - please refer to HPI    Review of Systems     Review of Systems   Constitutional: Negative  HENT: Negative  Eyes: Negative  Respiratory: Negative  Cardiovascular: Negative  Gastrointestinal: Negative  Endocrine: Negative  Genitourinary:        As per HPI   Musculoskeletal: Negative  Skin: Negative  Allergic/Immunologic: Negative  Neurological: Negative  Hematological: Negative  Psychiatric/Behavioral: Negative                Allergies     No Known Allergies    Physical Exam     Physical Exam Constitutional: He is oriented to person, place, and time  He appears well-developed and well-nourished  No distress  Physically fit, nontoxic-appearing man   HENT:   Head: Normocephalic and atraumatic  Right Ear: External ear normal    Left Ear: External ear normal    Nose: Nose normal    Eyes: Conjunctivae are normal  Right eye exhibits no discharge  Left eye exhibits no discharge  No scleral icterus  Neck: No tracheal deviation present  Cardiovascular: Intact distal pulses  Pulmonary/Chest: Effort normal  No stridor  No respiratory distress  Abdominal: Soft  He exhibits no distension and no mass  There is no tenderness  There is no rebound and no guarding  No hernia  Genitourinary:   Genitourinary Comments: The pubic hair has been trimmed, slight meatal stenosis present, no penile plaques are present, normal scrotal examination on the right, no testicular masses on the right or the left, there is a palpable, grade 2 varicocele on the left   Musculoskeletal: He exhibits no edema, tenderness or deformity  Neurological: He is alert and oriented to person, place, and time  No cranial nerve deficit  Coordination normal    Skin: Skin is warm and dry  No rash noted  He is not diaphoretic  No erythema  No pallor  Psychiatric: He has a normal mood and affect  His behavior is normal  Judgment and thought content normal    Nursing note and vitals reviewed            Vital Signs  Vitals:    07/30/19 1242   BP: 140/88   BP Location: Left arm   Patient Position: Sitting   Cuff Size: Standard   Pulse: 68   Weight: 58 4 kg (128 lb 12 8 oz)   Height: 5' 4" (1 626 m)         Current Medications       Current Outpatient Medications:     DULoxetine (CYMBALTA) 30 mg delayed release capsule, Take 30 mg by mouth every morning, Disp: , Rfl: 0    DULoxetine (CYMBALTA) 60 mg delayed release capsule, , Disp: , Rfl:     gabapentin (NEURONTIN) 300 mg capsule, Take 1 capsule (300 mg total) by mouth 2 (two) times a day for 180 days, Disp: 180 capsule, Rfl: 1    lamoTRIgine (LaMICtal) 100 mg tablet, Take 100 mg by mouth daily at bedtime, Disp: , Rfl: 0    lamoTRIgine (LaMICtal) 25 mg tablet, , Disp: , Rfl:     meloxicam (MOBIC) 7 5 mg tablet, Take 7 5 mg by mouth 2 (two) times a day, Disp: , Rfl: 1    QUEtiapine (SEROquel) 400 MG tablet, , Disp: , Rfl:     meloxicam (MOBIC) 7 5 mg tablet, Take 1 tablet (7 5 mg total) by mouth 2 (two) times a day for 180 days, Disp: 180 tablet, Rfl: 1      Active Problems     Patient Active Problem List   Diagnosis    Bipolar disease, chronic (HCC)    Chronic bilateral low back pain with left-sided sciatica    Depression with anxiety    Fibromyalgia    Hypogonadism male    Chronic neck pain    Pure hypercholesterolemia    Other hemorrhoids    Chronic idiopathic constipation    Testicular pain    Orchialgia    Left varicocele         Past Medical History     Past Medical History:   Diagnosis Date    Hemorrhoids     last assessed - [id number]         Surgical History     Past Surgical History:   Procedure Laterality Date    CERVICAL FUSION      Cervical Vertbral fusion         Family History     Family History   Problem Relation Age of Onset    Arthritis Mother     Hyperlipidemia Mother     Hyperthyroidism Father     Other Father         Prostate disease    Colon cancer Sister 40    Other Sister         Epilepsy    Asthma Brother          Social History     Social History     Social History     Tobacco Use   Smoking Status Never Smoker   Smokeless Tobacco Never Used         Pertinent Lab Values     Lab Results   Component Value Date    CREATININE 1 11 05/07/2019       No results found for: PSA          Pertinent Imaging      I have reviewed the patient's ultrasound, left varicocele is present

## 2019-07-30 ENCOUNTER — OFFICE VISIT (OUTPATIENT)
Dept: UROLOGY | Facility: CLINIC | Age: 44
End: 2019-07-30
Payer: MEDICARE

## 2019-07-30 VITALS
HEIGHT: 64 IN | SYSTOLIC BLOOD PRESSURE: 140 MMHG | DIASTOLIC BLOOD PRESSURE: 88 MMHG | WEIGHT: 128.8 LBS | HEART RATE: 68 BPM | BODY MASS INDEX: 21.99 KG/M2

## 2019-07-30 DIAGNOSIS — I86.1 LEFT VARICOCELE: ICD-10-CM

## 2019-07-30 DIAGNOSIS — E29.1 HYPOGONADISM MALE: Primary | ICD-10-CM

## 2019-07-30 DIAGNOSIS — N50.819 TESTICULAR PAIN: Chronic | ICD-10-CM

## 2019-07-30 PROCEDURE — 99214 OFFICE O/P EST MOD 30 MIN: CPT | Performed by: UROLOGY

## 2019-07-30 NOTE — LETTER
July 30, 2019     Rosalinda Chavez PA-C  [address] [facility]    Patient: Corey Dillon   YOB: 1975   Date of Visit: 7/30/2019       Dear Dr Fermin Rich: Thank you for referring Corey Dillon to me for evaluation  Below are my notes for this consultation  If you have questions, please do not hesitate to call me  I look forward to following your patient along with you  Sincerely,        Jacqueline Morocho MD        CC: MD Jacqueline Ayon MD  7/30/2019 12:54 PM  Sign at close encounter       Problem List Items Addressed This Visit        Endocrine    Hypogonadism male - Primary    Relevant Orders    Case request operating room: VARICOCELECTOMY (Completed)       Cardiovascular and Mediastinum    Left varicocele    Relevant Orders    Case request operating room: VARICOCELECTOMY (Completed)       Other    Testicular pain (Chronic)    Relevant Orders    Case request operating room: VARICOCELECTOMY (Completed)          Discussion:  I had a nice visit with Ashok Lin today  We discussed the pre, sridevi, and post operative care for left subinguinal varicocelectomy  We did talk about the potential risks of scrotal swelling, hydrocele, the chance that his pain persists after surgery, and the risks of infection, bleeding, pain, damage to surrounding structures, need for additional procedures, risk of failure of the procedure, risk of anesthesia, risk of positioning complications including neurapraxia, chronic pain, and paralysis as well as risk of rhabdomyolysis and compartment syndrome  Risk of deep venous thrombosis and venous thromboembolism as well as pneumonia and other potential unpredictable complications were also discussed with the patient  We spoke about the alternatives as well in terms of not performing surgery, he does wish to proceed with left subinguinal varicocelectomy    Indications for this operation include his discomfort, the presence of a varicocele, and his reported history of hypogonadism  There is emergent literature stating that repair of unilateral varicocele can also help to improve not only semen parameters but also hormone levels  Please note that today's interview him evaluation and management was conducted in the patient's stated preferred language of Finnish  Of note, he does speaking left as well  Informed consent was obtained today, we will proceed to the operating room in the coming weeks      Assessment and plan:       Please see problem oriented charting for the assessment plan of today's urological complaints    Jammie Hatfield MD      Chief Complaint     Chief Complaint   Patient presents with    varicocele    Left orchialgia      History of Present Illness     Tiffanie Thornton is a 39 y o  Finnish-speaking gentleman referred to me in consultation by my colleague, Dr Carissa Barnett for left varicocele  With regard to this complaint it is localized to the left testis  The quality is described as aching and annoying discomfort and the severity of this complaint is described as mild to moderate  These symptoms have been present for years and the timing is ongoing  Previous treatments include supportive therapies and previous work-up includes evaluation by my colleague, Dr Carissa Barnett, in the office  The patient mentions nothing particular as aggravating and alleviating factors, respectively  The following associated signs and symptoms are mentioned:  Discomfort and pressure in the scrotum          Reviewed with him surgical approaches to treatment of left-sided varicocele, and after this discussion the patient wishes to  proceed to surgical management of left varicocelectomy    The following portions of the patient's history were reviewed and updated as appropriate: allergies, current medications, past family history, past medical history, past social history, past surgical history and problem list     Detailed Urologic History     - please refer to HPI    Review of Systems     Review of Systems   Constitutional: Negative  HENT: Negative  Eyes: Negative  Respiratory: Negative  Cardiovascular: Negative  Gastrointestinal: Negative  Endocrine: Negative  Genitourinary:        As per HPI   Musculoskeletal: Negative  Skin: Negative  Allergic/Immunologic: Negative  Neurological: Negative  Hematological: Negative  Psychiatric/Behavioral: Negative  Allergies     No Known Allergies    Physical Exam     Physical Exam   Constitutional: He is oriented to person, place, and time  He appears well-developed and well-nourished  No distress  Physically fit, nontoxic-appearing man   HENT:   Head: Normocephalic and atraumatic  Right Ear: External ear normal    Left Ear: External ear normal    Nose: Nose normal    Eyes: Conjunctivae are normal  Right eye exhibits no discharge  Left eye exhibits no discharge  No scleral icterus  Neck: No tracheal deviation present  Cardiovascular: Intact distal pulses  Pulmonary/Chest: Effort normal  No stridor  No respiratory distress  Abdominal: Soft  He exhibits no distension and no mass  There is no tenderness  There is no rebound and no guarding  No hernia  Genitourinary:   Genitourinary Comments: The pubic hair has been trimmed, slight meatal stenosis present, no penile plaques are present, normal scrotal examination on the right, no testicular masses on the right or the left, there is a palpable, grade 2 varicocele on the left   Musculoskeletal: He exhibits no edema, tenderness or deformity  Neurological: He is alert and oriented to person, place, and time  No cranial nerve deficit  Coordination normal    Skin: Skin is warm and dry  No rash noted  He is not diaphoretic  No erythema  No pallor  Psychiatric: He has a normal mood and affect   His behavior is normal  Judgment and thought content normal    Nursing note and vitals reviewed            Vital Signs  Vitals:    07/30/19 1242   BP: 140/88   BP Location: Left arm   Patient Position: Sitting   Cuff Size: Standard   Pulse: 68   Weight: 58 4 kg (128 lb 12 8 oz)   Height: 5' 4" (1 626 m)         Current Medications       Current Outpatient Medications:     DULoxetine (CYMBALTA) 30 mg delayed release capsule, Take 30 mg by mouth every morning, Disp: , Rfl: 0    DULoxetine (CYMBALTA) 60 mg delayed release capsule, , Disp: , Rfl:     gabapentin (NEURONTIN) 300 mg capsule, Take 1 capsule (300 mg total) by mouth 2 (two) times a day for 180 days, Disp: 180 capsule, Rfl: 1    lamoTRIgine (LaMICtal) 100 mg tablet, Take 100 mg by mouth daily at bedtime, Disp: , Rfl: 0    lamoTRIgine (LaMICtal) 25 mg tablet, , Disp: , Rfl:     meloxicam (MOBIC) 7 5 mg tablet, Take 7 5 mg by mouth 2 (two) times a day, Disp: , Rfl: 1    QUEtiapine (SEROquel) 400 MG tablet, , Disp: , Rfl:     meloxicam (MOBIC) 7 5 mg tablet, Take 1 tablet (7 5 mg total) by mouth 2 (two) times a day for 180 days, Disp: 180 tablet, Rfl: 1      Active Problems     Patient Active Problem List   Diagnosis    Bipolar disease, chronic (HCC)    Chronic bilateral low back pain with left-sided sciatica    Depression with anxiety    Fibromyalgia    Hypogonadism male    Chronic neck pain    Pure hypercholesterolemia    Other hemorrhoids    Chronic idiopathic constipation    Testicular pain    Orchialgia    Left varicocele         Past Medical History     Past Medical History:   Diagnosis Date    Hemorrhoids     last assessed - [id number]         Surgical History     Past Surgical History:   Procedure Laterality Date    CERVICAL FUSION      Cervical Vertbral fusion         Family History     Family History   Problem Relation Age of Onset    Arthritis Mother     Hyperlipidemia Mother     Hyperthyroidism Father     Other Father         Prostate disease    Colon cancer Sister 40    Other Sister         Epilepsy    Asthma Brother          Social History     Social History     Social History     Tobacco Use   Smoking Status Never Smoker   Smokeless Tobacco Never Used         Pertinent Lab Values     Lab Results   Component Value Date    CREATININE 1 11 05/07/2019       No results found for: PSA          Pertinent Imaging      I have reviewed the patient's ultrasound, left varicocele is present

## 2019-08-13 ENCOUNTER — ANESTHESIA EVENT (OUTPATIENT)
Dept: PERIOP | Facility: AMBULARY SURGERY CENTER | Age: 44
End: 2019-08-13
Payer: MEDICARE

## 2019-08-19 NOTE — PRE-PROCEDURE INSTRUCTIONS
Pre-Surgery Instructions:   Medication Instructions    DULoxetine (CYMBALTA) 60 mg delayed release capsule Instructed patient per Anesthesia Guidelines   gabapentin (NEURONTIN) 300 mg capsule Instructed patient per Anesthesia Guidelines   lamoTRIgine (LaMICtal) 25 mg tablet Instructed patient per Anesthesia Guidelines   meloxicam (MOBIC) 7 5 mg tablet Instructed patient per Anesthesia Guidelines   QUEtiapine (SEROquel) 400 MG tablet Instructed patient per Anesthesia Guidelines  Pre-op and bathing instructions given  Patient has hibiclens

## 2019-08-27 ENCOUNTER — TELEPHONE (OUTPATIENT)
Dept: UROLOGY | Facility: CLINIC | Age: 44
End: 2019-08-27

## 2019-08-27 ENCOUNTER — ANESTHESIA (OUTPATIENT)
Dept: PERIOP | Facility: AMBULARY SURGERY CENTER | Age: 44
End: 2019-08-27
Payer: MEDICARE

## 2019-08-27 ENCOUNTER — HOSPITAL ENCOUNTER (OUTPATIENT)
Facility: AMBULARY SURGERY CENTER | Age: 44
Setting detail: OUTPATIENT SURGERY
Discharge: HOME | End: 2019-08-27
Attending: UROLOGY | Admitting: UROLOGY
Payer: MEDICARE

## 2019-08-27 VITALS
SYSTOLIC BLOOD PRESSURE: 140 MMHG | RESPIRATION RATE: 16 BRPM | DIASTOLIC BLOOD PRESSURE: 73 MMHG | WEIGHT: 128 LBS | TEMPERATURE: 97.8 F | HEART RATE: 60 BPM | OXYGEN SATURATION: 100 % | HEIGHT: 64 IN | BODY MASS INDEX: 21.85 KG/M2

## 2019-08-27 DIAGNOSIS — I86.1 LEFT VARICOCELE: Primary | ICD-10-CM

## 2019-08-27 DIAGNOSIS — N50.819 TESTICULAR PAIN: ICD-10-CM

## 2019-08-27 DIAGNOSIS — E29.1 HYPOGONADISM MALE: ICD-10-CM

## 2019-08-27 PROCEDURE — 88341 IMHCHEM/IMCYTCHM EA ADD ANTB: CPT | Performed by: PATHOLOGY

## 2019-08-27 PROCEDURE — 88304 TISSUE EXAM BY PATHOLOGIST: CPT | Performed by: PATHOLOGY

## 2019-08-27 PROCEDURE — 88313 SPECIAL STAINS GROUP 2: CPT | Performed by: PATHOLOGY

## 2019-08-27 PROCEDURE — 88342 IMHCHEM/IMCYTCHM 1ST ANTB: CPT | Performed by: PATHOLOGY

## 2019-08-27 PROCEDURE — NC001 PR NO CHARGE: Performed by: UROLOGY

## 2019-08-27 PROCEDURE — 55530 REVISE SPERMATIC CORD VEINS: CPT | Performed by: UROLOGY

## 2019-08-27 RX ORDER — KETOROLAC TROMETHAMINE 30 MG/ML
15 INJECTION, SOLUTION INTRAMUSCULAR; INTRAVENOUS EVERY 6 HOURS PRN
Status: DISCONTINUED | OUTPATIENT
Start: 2019-08-27 | End: 2019-08-27 | Stop reason: HOSPADM

## 2019-08-27 RX ORDER — MAGNESIUM HYDROXIDE 1200 MG/15ML
LIQUID ORAL AS NEEDED
Status: DISCONTINUED | OUTPATIENT
Start: 2019-08-27 | End: 2019-08-27 | Stop reason: HOSPADM

## 2019-08-27 RX ORDER — FENTANYL CITRATE/PF 50 MCG/ML
25 SYRINGE (ML) INJECTION
Status: DISCONTINUED | OUTPATIENT
Start: 2019-08-27 | End: 2019-08-27 | Stop reason: HOSPADM

## 2019-08-27 RX ORDER — OXYCODONE HYDROCHLORIDE AND ACETAMINOPHEN 5; 325 MG/1; MG/1
2 TABLET ORAL EVERY 6 HOURS PRN
Status: DISCONTINUED | OUTPATIENT
Start: 2019-08-27 | End: 2019-08-27 | Stop reason: HOSPADM

## 2019-08-27 RX ORDER — SODIUM CHLORIDE, SODIUM LACTATE, POTASSIUM CHLORIDE, CALCIUM CHLORIDE 600; 310; 30; 20 MG/100ML; MG/100ML; MG/100ML; MG/100ML
125 INJECTION, SOLUTION INTRAVENOUS CONTINUOUS
Status: DISCONTINUED | OUTPATIENT
Start: 2019-08-27 | End: 2019-08-27 | Stop reason: HOSPADM

## 2019-08-27 RX ORDER — DEXAMETHASONE SODIUM PHOSPHATE 10 MG/ML
INJECTION, SOLUTION INTRAMUSCULAR; INTRAVENOUS AS NEEDED
Status: DISCONTINUED | OUTPATIENT
Start: 2019-08-27 | End: 2019-08-27 | Stop reason: SURG

## 2019-08-27 RX ORDER — FENTANYL CITRATE 50 UG/ML
INJECTION, SOLUTION INTRAMUSCULAR; INTRAVENOUS AS NEEDED
Status: DISCONTINUED | OUTPATIENT
Start: 2019-08-27 | End: 2019-08-27 | Stop reason: SURG

## 2019-08-27 RX ORDER — MIDAZOLAM HYDROCHLORIDE 1 MG/ML
INJECTION INTRAMUSCULAR; INTRAVENOUS AS NEEDED
Status: DISCONTINUED | OUTPATIENT
Start: 2019-08-27 | End: 2019-08-27 | Stop reason: SURG

## 2019-08-27 RX ORDER — EPHEDRINE SULFATE 50 MG/ML
INJECTION INTRAVENOUS AS NEEDED
Status: DISCONTINUED | OUTPATIENT
Start: 2019-08-27 | End: 2019-08-27 | Stop reason: SURG

## 2019-08-27 RX ORDER — ONDANSETRON 2 MG/ML
4 INJECTION INTRAMUSCULAR; INTRAVENOUS ONCE AS NEEDED
Status: DISCONTINUED | OUTPATIENT
Start: 2019-08-27 | End: 2019-08-27 | Stop reason: HOSPADM

## 2019-08-27 RX ORDER — CEFAZOLIN SODIUM 1 G/50ML
1000 SOLUTION INTRAVENOUS ONCE
Status: COMPLETED | OUTPATIENT
Start: 2019-08-27 | End: 2019-08-27

## 2019-08-27 RX ORDER — OXYCODONE HYDROCHLORIDE AND ACETAMINOPHEN 5; 325 MG/1; MG/1
1 TABLET ORAL EVERY 6 HOURS PRN
Qty: 12 TABLET | Refills: 0 | Status: SHIPPED | OUTPATIENT
Start: 2019-08-27 | End: 2019-09-01

## 2019-08-27 RX ORDER — DIPHENHYDRAMINE HCL 25 MG
25 TABLET ORAL EVERY 6 HOURS PRN
Status: DISCONTINUED | OUTPATIENT
Start: 2019-08-27 | End: 2019-08-27 | Stop reason: HOSPADM

## 2019-08-27 RX ORDER — ONDANSETRON 2 MG/ML
4 INJECTION INTRAMUSCULAR; INTRAVENOUS EVERY 6 HOURS PRN
Status: DISCONTINUED | OUTPATIENT
Start: 2019-08-27 | End: 2019-08-27 | Stop reason: HOSPADM

## 2019-08-27 RX ORDER — ONDANSETRON 2 MG/ML
INJECTION INTRAMUSCULAR; INTRAVENOUS AS NEEDED
Status: DISCONTINUED | OUTPATIENT
Start: 2019-08-27 | End: 2019-08-27 | Stop reason: SURG

## 2019-08-27 RX ORDER — BUPIVACAINE HYDROCHLORIDE 2.5 MG/ML
INJECTION, SOLUTION INFILTRATION; PERINEURAL AS NEEDED
Status: DISCONTINUED | OUTPATIENT
Start: 2019-08-27 | End: 2019-08-27 | Stop reason: HOSPADM

## 2019-08-27 RX ORDER — ACETAMINOPHEN 325 MG/1
650 TABLET ORAL EVERY 6 HOURS PRN
Status: DISCONTINUED | OUTPATIENT
Start: 2019-08-27 | End: 2019-08-27 | Stop reason: HOSPADM

## 2019-08-27 RX ORDER — PROPOFOL 10 MG/ML
INJECTION, EMULSION INTRAVENOUS AS NEEDED
Status: DISCONTINUED | OUTPATIENT
Start: 2019-08-27 | End: 2019-08-27 | Stop reason: SURG

## 2019-08-27 RX ORDER — DOCUSATE SODIUM 100 MG/1
100 CAPSULE, LIQUID FILLED ORAL 3 TIMES DAILY
Qty: 42 CAPSULE | Refills: 0 | Status: SHIPPED | OUTPATIENT
Start: 2019-08-27 | End: 2020-10-14

## 2019-08-27 RX ADMIN — CEFAZOLIN SODIUM 1000 MG: 1 SOLUTION INTRAVENOUS at 14:02

## 2019-08-27 RX ADMIN — LIDOCAINE HYDROCHLORIDE 100 MG: 20 INJECTION, SOLUTION INTRAVENOUS at 14:04

## 2019-08-27 RX ADMIN — DEXAMETHASONE SODIUM PHOSPHATE 4 MG: 10 INJECTION, SOLUTION INTRAMUSCULAR; INTRAVENOUS at 14:10

## 2019-08-27 RX ADMIN — MIDAZOLAM HYDROCHLORIDE 2 MG: 1 INJECTION, SOLUTION INTRAMUSCULAR; INTRAVENOUS at 14:00

## 2019-08-27 RX ADMIN — PROPOFOL 200 MG: 10 INJECTION, EMULSION INTRAVENOUS at 14:04

## 2019-08-27 RX ADMIN — ONDANSETRON 4 MG: 2 INJECTION INTRAMUSCULAR; INTRAVENOUS at 14:10

## 2019-08-27 RX ADMIN — SODIUM CHLORIDE, SODIUM LACTATE, POTASSIUM CHLORIDE, AND CALCIUM CHLORIDE: .6; .31; .03; .02 INJECTION, SOLUTION INTRAVENOUS at 14:02

## 2019-08-27 RX ADMIN — FENTANYL CITRATE 50 MCG: 50 INJECTION, SOLUTION INTRAMUSCULAR; INTRAVENOUS at 14:22

## 2019-08-27 RX ADMIN — FENTANYL CITRATE 50 MCG: 50 INJECTION, SOLUTION INTRAMUSCULAR; INTRAVENOUS at 14:04

## 2019-08-27 RX ADMIN — EPHEDRINE SULFATE 5 MG: 50 INJECTION, SOLUTION INTRAVENOUS at 14:29

## 2019-08-27 NOTE — OP NOTE
OPERATIVE REPORT  PATIENT NAME: Lizzie Lemus    :  1975  MRN: [id number]  Pt Location: [facility]    SURGERY DATE: 2019    Surgeon(s) and Role:     * Susi Ramos MD - Primary    Preop Diagnosis:  Left varicocele [I86 1]  Testicular pain [N50 819]  Hypogonadism male [E29 1]    Post-Op Diagnosis Codes:     * Left varicocele [I86 1]     * Testicular pain [N50 819]     * Hypogonadism male [E29 1]    Procedure(s) (LRB):  VARICOCELECTOMY (Left)    Specimen(s):  ID Type Source Tests Collected by Time Destination   1 : LEFT SPERMATIC CORD BARRACK Tissue Other TISSUE EXAM Susi Ramos MD 2019 [id number]        Estimated Blood Loss:   Minimal    Drains:  * No LDAs found *    Anesthesia Type:   General    Operative Indications:  Left varicocele [I86 1]  Testicular pain [N50 819]  Hypogonadism male [E29 1]      Operative Findings:  Uncomplicated left varicocelectomy, 2 very large varices were noted, these were isolated, confirmed not to have arterial flow, and clipped with a medium clip applier, other small venous branches were then taken along with the cremaster muscles  The veins around the pampiniform plexus were not dilated, at the end of the operation there was a small amount of bleeding after further local anesthesia infiltration into the spermatic cord that was controlled with a interrupted figure-of-eight stitch around a small venous tributary  After ligature, the Doppler flow was confirmed within the spermatic cord again, with the handheld Doppler showing pulsatile flow to the testis  Complications:   None    Procedure and Technique: On an outpatient basis the patient was diagnosed with hypogonadism and left orchialgia as well as a varicocele, risks and benefits and alternatives of varicocelectomy were discussed with him and informed consent was signed      He was prepared for surgery and evaluated by the perioperative nursing and anesthesia team on the day of operation, he was deemed clear for anesthesia and the planned left varicocelectomy after being appropriately marked  He was then placed in the supine position for the induction of general anesthesia by way of a laryngeal mask airway, this went without issue or complication  A time-out confirmed the proper patient, position and procedure  We began by making a subinguinal incision on the left, Camper's fascia and Brooke's fascia were incised, the spermatic cord was isolated with a Forrest clamp, and delivered into the surgical field and propped up over a quarter-inch Penrose drain  The cremaster muscle was then opened and divided circumferentially  We then performed careful dissection of large varices within the spermatic cord, there were 2 main varices which were isolated and confirmed not to have pulsatile flow with the Doppler ultrasound  These were taken with medium clips and divided  A small specimen of vein was sent for specimen  We then dissected some vessels within the pampiniform plexus, these were seen to be pulsatile inflow with the Doppler ultrasound as such these were not ligated  Remaining cremaster muscles posteriorly were divided, no other varices were noted  Further local anesthesia, plain Marcaine was infiltrated into the spermatic cord, a small amount of bleeding was noted after this injection and this was controlled with a figure-of-eight to a Vicryl stitch  Prior to and after stitching this small bleeder, pulsatile flow was verified within the spermatic cord to the left testis and no further bleeding was noted  Overall the estimated blood loss was minimal today  The wound was then copiously irrigated, the spermatic cord was returned to its anatomic position  The Brooke's fascia was then closed with a 2 0 Vicryl stitch in a running fashion 4 0 Monocryl stitch  The skin was closed with skin glue      All instrument counts and sponge counts were correct     I was present for the entire procedure and A qualified resident physician was not available    Patient Disposition:  PACU      Plan:  The patient will see us back in the office in a number of weeks for postoperative follow-up purposes      SIGNATURE: Noelle Holly MD  DATE: August 27, 2019  TIME: 2:58 PM

## 2019-08-27 NOTE — TELEPHONE ENCOUNTER
Patient currently scheduled for 9/25 with Brianna Frazierer for post op  Will confirm this with patient after he is discharged

## 2019-08-27 NOTE — ANESTHESIA PREPROCEDURE EVALUATION
Review of Systems/Medical History  Patient summary reviewed  Chart reviewed  No history of anesthetic complications     Cardiovascular  Hyperlipidemia,    Pulmonary       GI/Hepatic            Endo/Other     GYN       Hematology   Musculoskeletal       Neurology   Psychology   Anxiety, Depression , bipolar disorder,              Physical Exam    Airway    Mallampati score: II  TM Distance: >3 FB  Neck ROM: full     Dental   No notable dental hx     Cardiovascular  Cardiovascular exam normal    Pulmonary  Pulmonary exam normal     Other Findings        Anesthesia Plan  ASA Score- 2     Anesthesia Type- general with ASA Monitors  Additional Monitors:   Airway Plan: LMA  Plan Factors-    Induction- intravenous  Postoperative Plan- Plan for postoperative opioid use  Informed Consent- Anesthetic plan and risks discussed with patient  I personally reviewed this patient with the CRNA  Discussed and agreed on the Anesthesia Plan with the CRNA  Celeste Mack

## 2019-08-27 NOTE — H&P
The patient was seen and examined  There are no changes from the prior outpatient history and physical examination  The patient is appropriately prepared for surgery today as planned      /83   Pulse 65   Temp 98 1 °F (36 7 °C) (Temporal)   Resp 16   Ht 5' 4" (1 626 m)   Wt 58 1 kg (128 lb)   SpO2 98%   BMI 21 97 kg/m²       Marked appropriately for surgery today

## 2019-08-27 NOTE — TELEPHONE ENCOUNTER
Patient is status post varicocelectomy, needs a follow-up visit with advanced practitioner in 4 weeks for follow-up purposes

## 2019-08-27 NOTE — DISCHARGE INSTRUCTIONS
Spermatic Vein Ligation   WHAT YOU NEED TO KNOW:     Gordon Kumar,    Today you had a varicocelectomy through a small incision in your left groin, we were able to tie off your dilated veins, we used a small, hand-held ultrasound to make sure that we did not compromise any of your arterial flow to the testicle  As discussed preoperatively, you will have swelling within the scrotum, you will have some pain as well and we have given you pain medications to take as needed for this purpose  We will see you back in the office in approximately 4 weeks to see how you are healing  If you have questions or concerns in the postoperative time frame, please do not hesitate to contact our office  Take care and be well,  Dr Grace Stone  A spermatic vein ligation is surgery to repair a varicocele  A spermatic vein ligation helps improve blood flow and decrease vein swelling  This may help improve the flow of sperm and improve fertility  DISCHARGE INSTRUCTIONS:   Call 911 for any of the following:   · You feel lightheaded, short of breath, and have chest pain  · You cough up blood  Seek care immediately if:   · Blood soaks through your bandage  · Your stitches come apart  · Your leg feels warm, tender, and painful  It may look swollen and red  Contact your healthcare provider if:   · You have increased pain at your incision site or in your scrotum  · You have a fever or chills  · Your wound is red, swollen, or draining pus  · You have nausea or vomiting  · You have questions or concerns about your condition or care  Medicines: You may need any of the following:  · Acetaminophen  decreases pain and fever  It is available without a doctor's order  Ask how much to take and how often to take it  Follow directions  Acetaminophen can cause liver damage if not taken correctly  · NSAIDs , such as ibuprofen, help decrease swelling, pain, and fever  This medicine is available with or without a doctor's order  NSAIDs can cause stomach bleeding or kidney problems in certain people  If you take blood thinner medicine, always ask your healthcare provider if NSAIDs are safe for you  Always read the medicine label and follow directions  · Prescription pain medicine  may be given  Ask how to take this medicine safely  · Take your medicine as directed  Contact your healthcare provider if you think your medicine is not helping or if you have side effects  Tell him or her if you are allergic to any medicine  Keep a list of the medicines, vitamins, and herbs you take  Include the amounts, and when and why you take them  Bring the list or the pill bottles to follow-up visits  Carry your medicine list with you in case of an emergency  Care for your wound as directed:  Carefully wash the wound with soap and water  Dry the area and put on new, clean bandages as directed  Change your bandages when they get wet or dirty  Elevate your scrotum:  Elevate your scrotum by resting it on a rolled towel while you are lying down  This will help decrease swelling and pain  Apply ice:  Apply ice on your scrotum for 15 to 20 minutes every hour or as directed  Use an ice pack, or put crushed ice in a plastic bag  Cover it with a towel  Ice helps prevent tissue damage and decreases swelling and pain  Wear an athletic supporter:  Wear an athletic support or snug briefs when you are up and walking around  This will help decrease pain, swelling, and pressure on your scrotum  Return to activities as directed: You may be able to return to your usual activities in about a week  This includes sexual activity  Avoid strenuous activity for 2 weeks  If you work in an office where you are less active, you can usually return in 2 days after surgery  Follow up with your healthcare provider or urologist as directed: You may need to return to have your stitches removed  Your healthcare provider may want to check another semen sample in a few months  Write down your questions so you remember to ask them during your visits  © 2017 [employer] Information is for End User's use only and may not be sold, redistributed or otherwise used for commercial purposes  All illustrations and images included in CareNotes® are the copyrighted property of A [employer]  or [employer]  The above information is an  only  It is not intended as medical advice for individual conditions or treatments  Talk to your doctor, nurse or pharmacist before following any medical regimen to see if it is safe and effective for you

## 2019-08-28 NOTE — TELEPHONE ENCOUNTER
Post Op Note    Lizzie Lemus is a 39 y o  male s/p left varicocelectomy performed 8/27/2019  Lizzie Lemus is a patient of Dr Montoya and is seen at the [facility] office  Left message for patient to call office to discuss his post op symptoms and also to confirm his appointment 9/25 with PA  Office number provided on voicemail

## 2019-08-29 NOTE — TELEPHONE ENCOUNTER
Left message for patient to call office to discuss his post op symptoms and also to confirm his appointment 9/25 with PA  Office number provided on [employer]

## 2019-08-30 NOTE — TELEPHONE ENCOUNTER
Left third message for patient to call office to discuss his post op symptoms and also to confirm his appointment 9/25 with PA  Office number provided on voicemail

## 2019-09-23 NOTE — PROGRESS NOTES
Assessment and plan:       1  Left varicocelectomy 08/27/2019  - Reports only minimal symptom improvement following the procedure  - He has fantastic incisional healing   - He will follow up in 3 months for symptom reassessment with Dr Marlee Hy Terese Cranker, PA-C      Chief Complaint     Chief Complaint   Patient presents with    Varicocele         History of Present Illness     Soila Fay is a 38 y o  male patient who underwent left varicocelectomy with Dr Trisha Christine on 08/27/2019  Patient reports slight improvement symptoms since surgery but continues to have pain similar to prior surgery  He does report that it is intermittent in nature and has not noticed any specifically aggravating or proving factors  He does think that potentially resting makes it better but he is often busy and does not have time to do this  He does report that approximately 2 weeks after surgery he did have a single episode of purulence drainage  He was able to clean the incision well and had no recurrences  Laboratory     Lab Results   Component Value Date    CREATININE 1 11 05/07/2019       No results found for: PSA    No results found for this or any previous visit (from the past 1 hour(s))  Review of Systems     Review of Systems   Constitutional: Negative for chills and fever  HENT: Negative  Eyes: Negative  Respiratory: Negative for shortness of breath  Cardiovascular: Negative for chest pain  Gastrointestinal: Negative for constipation, diarrhea, nausea and vomiting  Genitourinary: Positive for difficulty urinating (hesitancy of stream for about 5 seconds) and testicular pain  Negative for dysuria, frequency and hematuria  Musculoskeletal: Negative  Allergies     No Known Allergies    Physical Exam     Physical Exam   Constitutional: He is oriented to person, place, and time  He appears well-developed and well-nourished  No distress     HENT:   Head: Normocephalic and atraumatic  Right Ear: External ear normal    Left Ear: External ear normal    Nose: Nose normal    Eyes: Right eye exhibits no discharge  Left eye exhibits no discharge  No scleral icterus  Cardiovascular: Normal rate  Pulmonary/Chest: Effort normal    Genitourinary:   Genitourinary Comments: Left inguinal incision is well healed  No erythema, drainage, or tenderness  Musculoskeletal:   Ambulates independently   Neurological: He is alert and oriented to person, place, and time  Skin: Skin is warm and dry  He is not diaphoretic  Psychiatric: He has a normal mood and affect  His behavior is normal  Judgment and thought content normal    Nursing note and vitals reviewed          Vital Signs     Vitals:    09/25/19 1245   BP: 128/70   BP Location: Left arm   Patient Position: Sitting   Cuff Size: Adult   Pulse: 69   Weight: 59 kg (130 lb)   Height: 5' 4" (1 626 m)         Current Medications       Current Outpatient Medications:     DULoxetine (CYMBALTA) 30 mg delayed release capsule, Take 30 mg by mouth every morning, Disp: , Rfl: 0    DULoxetine (CYMBALTA) 60 mg delayed release capsule, , Disp: , Rfl:     gabapentin (NEURONTIN) 300 mg capsule, Take 1 capsule (300 mg total) by mouth 2 (two) times a day for 180 days, Disp: 180 capsule, Rfl: 1    lamoTRIgine (LaMICtal) 100 mg tablet, Take 100 mg by mouth daily at bedtime, Disp: , Rfl: 0    lamoTRIgine (LaMICtal) 25 mg tablet, , Disp: , Rfl:     meloxicam (MOBIC) 7 5 mg tablet, Take 7 5 mg by mouth 2 (two) times a day, Disp: , Rfl: 1    QUEtiapine (SEROquel) 400 MG tablet, , Disp: , Rfl:     docusate sodium (COLACE) 100 mg capsule, Take 1 capsule (100 mg total) by mouth 3 (three) times a day for 14 days, Disp: 42 capsule, Rfl: 0    meloxicam (MOBIC) 7 5 mg tablet, Take 1 tablet (7 5 mg total) by mouth 2 (two) times a day for 180 days, Disp: 180 tablet, Rfl: 1      Active Problems     Patient Active Problem List   Diagnosis    Bipolar disease, chronic (HCC)    Chronic bilateral low back pain with left-sided sciatica    Depression with anxiety    Fibromyalgia    Hypogonadism male    Chronic neck pain    Pure hypercholesterolemia    Other hemorrhoids    Chronic idiopathic constipation    Testicular pain    Orchialgia    Left varicocele         Past Medical History     Past Medical History:   Diagnosis Date    Anxiety     Bipolar disorder (HCC)     Chronic back pain     with left sided sciatica    Constipation     Depression     Fibromyalgia, primary     Hemorrhoids     last assessed - [id number]    Hyperlipidemia          Surgical History     Past Surgical History:   Procedure Laterality Date    CERVICAL FUSION      Cervical Vertbral fusion    SD EXCISE VARICOCELE Left 8/27/2019    Procedure: VARICOCELECTOMY;  Surgeon: Noelle Holly MD;  Location: [facility];  Service: Urology         Family History     Family History   Problem Relation Age of Onset    Arthritis Mother     Hyperlipidemia Mother     Hyperthyroidism Father     Other Father         Prostate disease    Colon cancer Sister 40    Other Sister         Epilepsy    Asthma Brother          Social History     Social History       Radiology

## 2019-09-25 ENCOUNTER — OFFICE VISIT (OUTPATIENT)
Dept: UROLOGY | Facility: CLINIC | Age: 44
End: 2019-09-25
Payer: MEDICARE

## 2019-09-25 VITALS
SYSTOLIC BLOOD PRESSURE: 128 MMHG | DIASTOLIC BLOOD PRESSURE: 70 MMHG | BODY MASS INDEX: 22.2 KG/M2 | HEART RATE: 69 BPM | WEIGHT: 130 LBS | HEIGHT: 64 IN

## 2019-09-25 DIAGNOSIS — I86.1 LEFT VARICOCELE: Primary | ICD-10-CM

## 2019-09-25 PROCEDURE — 99024 POSTOP FOLLOW-UP VISIT: CPT | Performed by: PHYSICIAN ASSISTANT

## 2020-01-02 NOTE — PROGRESS NOTES
Problem List Items Addressed This Visit        Cardiovascular and Mediastinum    Left varicocele     S/p left varicocelectomy, no recurrence, all symptoms have improved and disappeared, patient very happy            Genitourinary    Benign localized prostatic hyperplasia with lower urinary tract symptoms (LUTS) - Velvet Russo had a productive consultation today regarding his lower urinary tract symptoms  We discussed the possible sources of these symptoms including failure to properly store urine and failure to properly empty urine  We discussed the normal anatomy of the bladder, prostate, bladder neck, and urethra, as well as the sphincter mechanism and the normal sequence of relaxation of the external urinary sphincter followed by contraction of the detrusor muscle and evacuation of urinary bladder  We discussed use of the AUA symptom score as a validated benefit of tracking lower urinary tract symptoms over time  We also discussed the role of a uroflow determination as well as a postvoid residual determination as well as the normal findings of both of these tests  We then discussed treatment of lower urinary tract symptoms in the form of medical therapy, behavioral changes and fluid modification to decrease symptoms, the use of minimally invasive therapies for bladder outlet obstruction such as the Urolift procedure, and the use of Transurethral resection of prostate and simple prostatectomy in patients with severe bladder outlet obstruction  With regard to behavioral therapies we talked about obtaining a healthy body weight, the use of physical activity to decrease LUTS, and the avoidance of bladder irritants such as excessive caffeine, alcohol, spicy foods, acidic foods, and the use and timing of certain medications such as diuretics which may worsen LUTS      With regard to medical therapies we discussed alpha blockers such as tamsulosin as well as 5 alpha reductase inhibitors such as finasteride  We discussed the mechanism of action of each and the potential risks and side effects including dizziness, weakness, hypotension, retrograde ejaculation, potential for allergic reaction, decreased in semen volume, decreased male pattern baldness for 5 alpha reductase inhibitors, and potential for decrease in libido or erectile dysfunction in less than 10% of men taking 5 alpha reductase inhibitors  We talked about the to decrease in the AUA symptom score for medical therapy in the range of 5-7 units on the AUA symptom score  In men who wish to not take medical therapy and in those men who desire a more definitive procedure while avoiding the risks of a more invasive therapy for bladder outlet obstruction, the Urolift procedure is a useful option  The mechanics of this operation and the pre, sridevi, and postoperative care were discussed with the patient and the patient was given literature on this procedure  The expected decrease in AUA symptom score of around 11 units after this procedure was also discussed with them  Finally, we reviewed the indications for more invasive therapies such as TURP and simple prostatectomy including gross hematuria from benign prostatic enlargement, recurrent urinary tract infections from bladder outlet obstruction, bladder stone formation, urinary retention from benign prostatic enlargement, and on managed symptoms in men taking medical therapy for their LUTS  The role of transrectal ultrasound and potential prostate biopsy for determination of prostatic volume and risk for prostate cancer prior to the above therapies was also discussed with the patient      Plan: prostate is 40-45 grams and smooth, interested in alpha blocker, given info on urolift as well, RTC 3 months for AUA SS and uroflow/PVR, if no improvement would recommend cysto/TRUS in preparation for urolift or TURP           Relevant Medications    tamsulosin (FLOMAX) 0 4 mg    Other Relevant Orders    PSA Total, Diagnostic                Assessment and plan:       Please see problem oriented charting for the assessment plan of today's urological complaints    Jose Hooker MD      Chief Complaint     Chief Complaint   Patient presents with    Hypogonadism male         History of Present Illness     Shalini David is a 38 y o   gentleman that was previously seen by Dr Kassie Washburn and sent to me for varicocelectomy, a procedure which he underwent in August of this year  Since we last saw him in September he notes that his pain has disappeared  New complaints today include LUTS, hesitancy and slow stream     These have been present for months, not worsened or made better by anything, bother is moderate, no associated symptoms, no previous treatments, ongoing, interested in some form of treatment for these    The following portions of the patient's history were reviewed and updated as appropriate: allergies, current medications, past family history, past medical history, past social history, past surgical history and problem list     Detailed Urologic History     - please refer to HPI    Review of Systems     Review of Systems   Constitutional: Negative  HENT: Negative  Eyes: Negative  Respiratory: Negative  Cardiovascular: Negative  Gastrointestinal: Negative  Endocrine: Negative  Genitourinary:        As per HPI   Musculoskeletal: Negative  Skin: Negative  Allergic/Immunologic: Negative  Neurological: Negative  Hematological: Negative  Psychiatric/Behavioral: Negative  Allergies     No Known Allergies    Physical Exam     Physical Exam   Constitutional: He is oriented to person, place, and time  He appears well-developed and well-nourished  No distress  HENT:   Head: Normocephalic and atraumatic  Eyes: Pupils are equal, round, and reactive to light  EOM are normal  Right eye exhibits no discharge  Left eye exhibits no discharge     Neck: No tracheal deviation present  Cardiovascular: Intact distal pulses  Pulmonary/Chest: Effort normal  No stridor  No respiratory distress  Abdominal: Soft  He exhibits no distension and no mass  There is no tenderness  There is no rebound and no guarding  No hernia  Genitourinary:   Genitourinary Comments: Prostate is 40-45 grams, smooth, no nodules   Musculoskeletal: He exhibits no edema, tenderness or deformity  Neurological: He is alert and oriented to person, place, and time  No cranial nerve deficit  Coordination normal    Skin: Skin is warm and dry  No rash noted  He is not diaphoretic  No erythema  No pallor  Psychiatric: He has a normal mood and affect  His behavior is normal  Judgment and thought content normal    Nursing note and vitals reviewed            Vital Signs  Vitals:    01/07/20 0912   BP: 128/76   Pulse: 64   Weight: 59 kg (130 lb)   Height: 5' 4" (1 626 m)         Current Medications       Current Outpatient Medications:     DULoxetine (CYMBALTA) 60 mg delayed release capsule, , Disp: , Rfl:     lamoTRIgine (LaMICtal) 25 mg tablet, , Disp: , Rfl:     meloxicam (MOBIC) 7 5 mg tablet, Take 7 5 mg by mouth 2 (two) times a day, Disp: , Rfl: 1    QUEtiapine (SEROquel) 400 MG tablet, , Disp: , Rfl:     docusate sodium (COLACE) 100 mg capsule, Take 1 capsule (100 mg total) by mouth 3 (three) times a day for 14 days, Disp: 42 capsule, Rfl: 0    gabapentin (NEURONTIN) 300 mg capsule, Take 1 capsule (300 mg total) by mouth 2 (two) times a day for 180 days, Disp: 180 capsule, Rfl: 1    meloxicam (MOBIC) 7 5 mg tablet, Take 1 tablet (7 5 mg total) by mouth 2 (two) times a day for 180 days, Disp: 180 tablet, Rfl: 1    tamsulosin (FLOMAX) 0 4 mg, Take 1 capsule (0 4 mg total) by mouth daily at bedtime, Disp: 30 capsule, Rfl: 3      Active Problems     Patient Active Problem List   Diagnosis    Bipolar disease, chronic (HCC)    Chronic bilateral low back pain with left-sided sciatica    Depression with anxiety    Fibromyalgia    Hypogonadism male    Chronic neck pain    Pure hypercholesterolemia    Other hemorrhoids    Chronic idiopathic constipation    Left varicocele    Benign localized prostatic hyperplasia with lower urinary tract symptoms (LUTS)         Past Medical History     Past Medical History:   Diagnosis Date    Anxiety     Bipolar disorder (HCC)     Chronic back pain     with left sided sciatica    Constipation     Depression     Fibromyalgia, primary     Hemorrhoids     last assessed - [id number]    Hyperlipidemia          Surgical History     Past Surgical History:   Procedure Laterality Date    CERVICAL FUSION      Cervical Vertbral fusion    TN EXCISE VARICOCELE Left 8/27/2019    Procedure: VARICOCELECTOMY;  Surgeon: Lulu Alba MD;  Location: [facility];  Service: Urology         Family History     Family History   Problem Relation Age of Onset    Arthritis Mother     Hyperlipidemia Mother     Hyperthyroidism Father     Other Father         Prostate disease    Colon cancer Sister 40    Other Sister         Epilepsy    Asthma Brother          Social History     Social History     Social History     Tobacco Use   Smoking Status Never Smoker   Smokeless Tobacco Never Used         Pertinent Lab Values     Lab Results   Component Value Date    CREATININE 1 11 05/07/2019       No results found for: PSA          Pertinent Imaging      no new imaging for my review

## 2020-01-02 NOTE — PATIENT INSTRUCTIONS
Dolor en el escroto   LO QUE NECESITA SABER:   ¿Qué necesito saber sobre el dolor en el escroto? El dolor en el escroto puede ocurrir a cualquier edad  La causa del dolor en el escroto puede ir desde irma lesión stephie hasta irma grave afección médica  Es muy importante buscar atención médica inmediata si se presenta dolor en el escroto  El dolor puede ser un signo de advertencia de Sridhar Benitez condición grave que necesita tratamiento  Sin atención médica inmediata, puede estar en mayor riesgo de perder un testículo o quedar estéril (imposibilidad de tener hijos)  ¿Cuál puede ser la causa del dolor escrotal?   · Torsión (retorcimiento) del testículo, el cordón que transporta los espermas desde el testículo o el tejido unido al testículo    · Irma infección del testículo o de otras áreas en el escroto    · Hidrocele (acumulación de líquido alrededor del testículo) o varicocele (acumulación de melisa en las venas del escroto)    · Liu Melissa hernia inguinal (tejido que se sale fuera de lugar en la diamond)    · Gangrena de Terence (muerte del tejido de la matthew entre el escroto y el ano)    · Irma infección en el tracto urinario o un cálculo que pasa a través de él, o irma infección en el apéndice    · Veria Inch en la diamond o el escroto  ¿Cuáles son los signos de advertencia de un problema médico grave? Busque atención inmediata en laurita de presentar cualquiera de los siguientes:  · Dolor que comienza de manera repentina o intensa    · Hinchazón en el escroto o la diamond, especialmente si también tiene dolor intenso o vómitos    · Parches rojos o negros en la piel del escroto o el área entre el pene y el ano    · Ampollas en la diamond o el escroto    · Fiebre  ¿Cómo se diagnostica la causa del dolor en el escroto? [place] médico lo examinará y le hará preguntas acerca del dolor  Informe al médico cuándo comenzó el dolor y cuánto dura cada vez que ocurre  El médico le preguntará si el dolor comenzó en otra matthew y se trasladó hacia el escroto  También es posible que el dolor se haya trasladado desde el escroto hacia otra matthew  Informe al médico si usted siente dolor cuando realiza ejercicio o si tuvo irma lesión en la diamond  También infórmele al médico si usted tiene algún problema para orinar o si sale alguna secreción de zuniga pene  Zuniga médico también puede preguntarle acerca de zuniga actividad sexual   · Los análisis de melisa:  podrían realizarse para comprobar si Margeret Bach signos de infección  · Pueden tomarse imágenes de ultrasonido  y verificar si estas muestran un problema en los testículos o los tejidos del escroto  Irma ecografía también puede mostrar cálculos renales u otros problemas que pueden estar causando zuniga dolor  ¿Cómo se trata el dolor en el escroto? El tratamiento depende de la causa del dolor:  · Un medicamento con receta para el dolor  podrían ser Angelo Makua  Pregunte al médico cómo debe silviano sue medicamento de forma adler  Algunos medicamentos recetados para el dolor contienen acetaminofén  No tome otros medicamentos que contengan acetaminofén sin consultarlo con zuniga médico  Demasiado acetaminofeno puede causar daño al hígado  Los medicamentos recetados para el dolor podrían causar estreñimiento  Pregunte a zuniga médico hilda prevenir o tratar estreñimiento  · AINEs (Analgésicos antiinflamatorios no esteroides) hilda el ibuprofeno, ayudan a disminuir la inflamación, el dolor y la [place]  Sue medicamento esta disponible con o sin irma receta médica  Los AINEs pueden causar sangrado estomacal o problemas renales en ciertas personas  Si usted ilene un medicamento anticoagulante, siempre pregúntele a zuniga médico si los EVI son seguros para usted  Siempre delphine la etiqueta de sue medicamento y [place] instrucciones  · Antibióticos  se usan para tratar irma infección bacteriana  · Cirugía  puede ser necesaria para destorcer el testículo o el cordón, o para remover el tejido muerto o infectado  ¿Qué puedo hacer para manejar mis síntomas? · Use un dispositivo de apoyo, si se lo indican  Un dispositivo de [de-identified], hidla un suspensorio, puede ayudar a mantener el Malone Honer y apoyado  Ponder puede ayudar a disminuir el dolor  · Aplíquese hielo en la escroto  El hielo ayuda a disminuir el dolor y la inflamación  Use un paquete de hielo o ponga hielo molido dentro de [employer]  Cubra el paquete o la bolsa con irma toalla antes de colocarlos sobre el escroto  Aplique hielo hamida 15 a 20 minutos por hora o según indicaciones  ¿Cuándo michelle buscar atención inmediata? · Tiene algún signo de advertencia de un problema grave  · Usted tiene dolor o inflamación que comienza o empeora rápidamente  · Se presentan cambios en la piel del escroto, hilda irma ijeoma Power  · Usted tiene fiebre  ¿Cuándo michelle comunicarme con mi médico?   · Richardson dolor no mejora, incluso después de silviano el medicamento para el dolor  · Usted siente un dolor nuevo o el dolor empeora  · Usted tiene preguntas o inquietudes acerca de richardson condición o cuidado  ACUERDOS SOBRE RICHARDSON CUIDADO:   Usted tiene el derecho de ayudar a planear richardson cuidado  Aprenda todo lo que pueda sobre richardson condición y hilda darle tratamiento  Discuta will opciones de tratamiento con will médicos para decidir el cuidado que usted desea recibir  Usted siempre tiene el derecho de rechazar el tratamiento  Esta información es sólo para uso en educación  Richardson intención no es darle un consejo médico sobre enfermedades o tratamientos  Colsulte con richardson Wendy Chio farmacéutico antes de seguir cualquier régimen médico para saber si es seguro y efectivo para usted  © 2017 [address] Information is for End User's use only and may not be sold, redistributed or otherwise used for commercial purposes  All illustrations and images included in CareNotes® are the copyrighted property of A D A M , Inc  or Barron Andersen

## 2020-01-07 ENCOUNTER — OFFICE VISIT (OUTPATIENT)
Dept: UROLOGY | Facility: CLINIC | Age: 45
End: 2020-01-07
Payer: MEDICARE

## 2020-01-07 VITALS
WEIGHT: 130 LBS | HEIGHT: 64 IN | BODY MASS INDEX: 22.2 KG/M2 | HEART RATE: 64 BPM | DIASTOLIC BLOOD PRESSURE: 76 MMHG | SYSTOLIC BLOOD PRESSURE: 128 MMHG

## 2020-01-07 DIAGNOSIS — N40.1 BENIGN LOCALIZED PROSTATIC HYPERPLASIA WITH LOWER URINARY TRACT SYMPTOMS (LUTS): Primary | ICD-10-CM

## 2020-01-07 DIAGNOSIS — I86.1 LEFT VARICOCELE: ICD-10-CM

## 2020-01-07 PROBLEM — N50.819 ORCHIALGIA: Status: RESOLVED | Noted: 2019-07-30 | Resolved: 2020-01-07

## 2020-01-07 PROBLEM — N50.819 TESTICULAR PAIN: Chronic | Status: RESOLVED | Noted: 2019-05-07 | Resolved: 2020-01-07

## 2020-01-07 PROCEDURE — 99214 OFFICE O/P EST MOD 30 MIN: CPT | Performed by: UROLOGY

## 2020-01-07 RX ORDER — TAMSULOSIN HYDROCHLORIDE 0.4 MG/1
0.4 CAPSULE ORAL
Qty: 30 CAPSULE | Refills: 3 | Status: SHIPPED | OUTPATIENT
Start: 2020-01-07 | End: 2020-10-14

## 2020-01-07 NOTE — ASSESSMENT & PLAN NOTE
Victoriano Andrew and I had a productive consultation today regarding his lower urinary tract symptoms  We discussed the possible sources of these symptoms including failure to properly store urine and failure to properly empty urine  We discussed the normal anatomy of the bladder, prostate, bladder neck, and urethra, as well as the sphincter mechanism and the normal sequence of relaxation of the external urinary sphincter followed by contraction of the detrusor muscle and evacuation of urinary bladder  We discussed use of the AUA symptom score as a validated benefit of tracking lower urinary tract symptoms over time  We also discussed the role of a uroflow determination as well as a postvoid residual determination as well as the normal findings of both of these tests  We then discussed treatment of lower urinary tract symptoms in the form of medical therapy, behavioral changes and fluid modification to decrease symptoms, the use of minimally invasive therapies for bladder outlet obstruction such as the Urolift procedure, and the use of Transurethral resection of prostate and simple prostatectomy in patients with severe bladder outlet obstruction  With regard to behavioral therapies we talked about obtaining a healthy body weight, the use of physical activity to decrease LUTS, and the avoidance of bladder irritants such as excessive caffeine, alcohol, spicy foods, acidic foods, and the use and timing of certain medications such as diuretics which may worsen LUTS  With regard to medical therapies we discussed alpha blockers such as tamsulosin as well as 5 alpha reductase inhibitors such as finasteride    We discussed the mechanism of action of each and the potential risks and side effects including dizziness, weakness, hypotension, retrograde ejaculation, potential for allergic reaction, decreased in semen volume, decreased male pattern baldness for 5 alpha reductase inhibitors, and potential for decrease in libido or erectile dysfunction in less than 10% of men taking 5 alpha reductase inhibitors  We talked about the to decrease in the AUA symptom score for medical therapy in the range of 5-7 units on the AUA symptom score  In men who wish to not take medical therapy and in those men who desire a more definitive procedure while avoiding the risks of a more invasive therapy for bladder outlet obstruction, the Urolift procedure is a useful option  The mechanics of this operation and the pre, sridevi, and postoperative care were discussed with the patient and the patient was given literature on this procedure  The expected decrease in AUA symptom score of around 11 units after this procedure was also discussed with them  Finally, we reviewed the indications for more invasive therapies such as TURP and simple prostatectomy including gross hematuria from benign prostatic enlargement, recurrent urinary tract infections from bladder outlet obstruction, bladder stone formation, urinary retention from benign prostatic enlargement, and on managed symptoms in men taking medical therapy for their LUTS  The role of transrectal ultrasound and potential prostate biopsy for determination of prostatic volume and risk for prostate cancer prior to the above therapies was also discussed with the patient      Plan: prostate is 40-45 grams and smooth, interested in alpha blocker, given info on urolift as well, RTC 3 months for AUA SS and uroflow/PVR, if no improvement would recommend cysto/TRUS in preparation for urolift or TURP

## 2020-01-07 NOTE — ASSESSMENT & PLAN NOTE
S/p left varicocelectomy, no recurrence, all symptoms have improved and disappeared, patient very happy

## 2020-03-11 ENCOUNTER — HOSPITAL ENCOUNTER (OUTPATIENT)
Dept: NON INVASIVE DIAGNOSTICS | Facility: HOSPITAL | Age: 45
Discharge: HOME | End: 2020-03-11
Payer: MEDICARE

## 2020-03-11 ENCOUNTER — TRANSCRIBE ORDERS (OUTPATIENT)
Dept: GASTROENTEROLOGY | Facility: HOSPITAL | Age: 45
End: 2020-03-11

## 2020-03-11 DIAGNOSIS — F31.30 BIPOLAR I DISORDER, MOST RECENT EPISODE DEPRESSED (HCC): ICD-10-CM

## 2020-03-11 DIAGNOSIS — F31.30 BIPOLAR I DISORDER, MOST RECENT EPISODE DEPRESSED (HCC): Primary | ICD-10-CM

## 2020-03-11 LAB
ATRIAL RATE: 57 BPM
P AXIS: 78 DEGREES
PR INTERVAL: 144 MS
QRS AXIS: 52 DEGREES
QRSD INTERVAL: 90 MS
QT INTERVAL: 372 MS
QTC INTERVAL: 362 MS
T WAVE AXIS: 38 DEGREES
VENTRICULAR RATE: 57 BPM

## 2020-03-11 PROCEDURE — 93005 ELECTROCARDIOGRAM TRACING: CPT

## 2020-03-11 PROCEDURE — 93010 ELECTROCARDIOGRAM REPORT: CPT | Performed by: INTERNAL MEDICINE

## 2020-03-30 ENCOUNTER — TELEPHONE (OUTPATIENT)
Dept: UROLOGY | Facility: MEDICAL CENTER | Age: 45
End: 2020-03-30

## 2020-06-17 ENCOUNTER — TELEPHONE (OUTPATIENT)
Dept: UROLOGY | Facility: MEDICAL CENTER | Age: 45
End: 2020-06-17

## 2020-06-29 ENCOUNTER — TRANSCRIBE ORDERS (OUTPATIENT)
Dept: LAB | Facility: HOSPITAL | Age: 45
End: 2020-06-29

## 2020-06-29 ENCOUNTER — APPOINTMENT (OUTPATIENT)
Dept: LAB | Facility: HOSPITAL | Age: 45
End: 2020-06-29
Payer: MEDICARE

## 2020-06-29 DIAGNOSIS — F31.30 BIPOLAR I DISORDER, MOST RECENT EPISODE DEPRESSED (HCC): Primary | ICD-10-CM

## 2020-06-29 DIAGNOSIS — F31.30 BIPOLAR I DISORDER, MOST RECENT EPISODE DEPRESSED (HCC): ICD-10-CM

## 2020-06-29 LAB
ALBUMIN SERPL BCP-MCNC: 3.7 G/DL (ref 3.5–5)
ALP SERPL-CCNC: 46 U/L (ref 46–116)
ALT SERPL W P-5'-P-CCNC: 25 U/L (ref 12–78)
ANION GAP SERPL CALCULATED.3IONS-SCNC: 4 MMOL/L (ref 4–13)
AST SERPL W P-5'-P-CCNC: 17 U/L (ref 5–45)
BILIRUB SERPL-MCNC: 0.6 MG/DL (ref 0.2–1)
BUN SERPL-MCNC: 22 MG/DL (ref 5–25)
CALCIUM SERPL-MCNC: 9.4 MG/DL (ref 8.3–10.1)
CHLORIDE SERPL-SCNC: 107 MMOL/L (ref 100–108)
CHOLEST SERPL-MCNC: 173 MG/DL (ref 50–200)
CO2 SERPL-SCNC: 30 MMOL/L (ref 21–32)
CREAT SERPL-MCNC: 1.3 MG/DL (ref 0.6–1.3)
ERYTHROCYTE [DISTWIDTH] IN BLOOD BY AUTOMATED COUNT: 12.5 % (ref 11.6–15.1)
EST. AVERAGE GLUCOSE BLD GHB EST-MCNC: 100 MG/DL
FOLATE SERPL-MCNC: 18.1 NG/ML (ref 3.1–17.5)
GFR SERPL CREATININE-BSD FRML MDRD: 66 ML/MIN/1.73SQ M
GLUCOSE P FAST SERPL-MCNC: 84 MG/DL (ref 65–99)
HBA1C MFR BLD: 5.1 %
HCT VFR BLD AUTO: 41.2 % (ref 36.5–49.3)
HDLC SERPL-MCNC: 41 MG/DL
HGB BLD-MCNC: 13.2 G/DL (ref 12–17)
LDLC SERPL CALC-MCNC: 112 MG/DL (ref 0–100)
MCH RBC QN AUTO: 28.3 PG (ref 26.8–34.3)
MCHC RBC AUTO-ENTMCNC: 32 G/DL (ref 31.4–37.4)
MCV RBC AUTO: 88 FL (ref 82–98)
NONHDLC SERPL-MCNC: 132 MG/DL
PLATELET # BLD AUTO: 194 THOUSANDS/UL (ref 149–390)
PMV BLD AUTO: 10.2 FL (ref 8.9–12.7)
POTASSIUM SERPL-SCNC: 4.1 MMOL/L (ref 3.5–5.3)
PROT SERPL-MCNC: 7.4 G/DL (ref 6.4–8.2)
RBC # BLD AUTO: 4.66 MILLION/UL (ref 3.88–5.62)
SODIUM SERPL-SCNC: 141 MMOL/L (ref 136–145)
TRIGL SERPL-MCNC: 101 MG/DL
TSH SERPL DL<=0.05 MIU/L-ACNC: 2.95 UIU/ML (ref 0.36–3.74)
VIT B12 SERPL-MCNC: 152 PG/ML (ref 100–900)
WBC # BLD AUTO: 4.67 THOUSAND/UL (ref 4.31–10.16)

## 2020-06-29 PROCEDURE — 80061 LIPID PANEL: CPT

## 2020-06-29 PROCEDURE — 83036 HEMOGLOBIN GLYCOSYLATED A1C: CPT

## 2020-06-29 PROCEDURE — 80053 COMPREHEN METABOLIC PANEL: CPT

## 2020-06-29 PROCEDURE — 36415 COLL VENOUS BLD VENIPUNCTURE: CPT

## 2020-06-29 PROCEDURE — 82607 VITAMIN B-12: CPT

## 2020-06-29 PROCEDURE — 80307 DRUG TEST PRSMV CHEM ANLYZR: CPT

## 2020-06-29 PROCEDURE — 82746 ASSAY OF FOLIC ACID SERUM: CPT

## 2020-06-29 PROCEDURE — 84443 ASSAY THYROID STIM HORMONE: CPT

## 2020-06-29 PROCEDURE — 85027 COMPLETE CBC AUTOMATED: CPT

## 2020-06-30 LAB
AMPHETAMINES UR QL SCN: NEGATIVE NG/ML
BARBITURATES UR QL SCN: NEGATIVE NG/ML
BENZODIAZ UR QL: NEGATIVE NG/ML
BZE UR QL: NEGATIVE NG/ML
CANNABINOIDS UR QL SCN: NEGATIVE NG/ML
METHADONE UR QL SCN: NEGATIVE NG/ML
OPIATES UR QL: NEGATIVE NG/ML
OXYCODONE+OXYMORPHONE UR QL SCN: NEGATIVE NG/ML
PCP UR QL: NEGATIVE NG/ML
PROPOXYPH UR QL SCN: NEGATIVE NG/ML

## 2020-07-15 ENCOUNTER — TELEPHONE (OUTPATIENT)
Dept: UROLOGY | Facility: CLINIC | Age: 45
End: 2020-07-15

## 2020-07-15 NOTE — TELEPHONE ENCOUNTER
Please advise patient's appointment on 7/31/20 with Dr Rosales Diver had to be rescheduled due to schedule bumps  In order for him to be seen in a timely fashion, I have already rescheduled him for 7/20/20 at 1245pm     Please confirm

## 2020-07-15 NOTE — TELEPHONE ENCOUNTER
Called and left message for patient that appointment for 7/31 has been canceled and has been rescheduled to Monday 7/20 at 12:45pm at the [facility] [place] office  Left office number in message and asked patient to call back to confirm

## 2020-07-16 ENCOUNTER — APPOINTMENT (OUTPATIENT)
Dept: LAB | Facility: HOSPITAL | Age: 45
End: 2020-07-16
Attending: UROLOGY
Payer: MEDICARE

## 2020-07-16 DIAGNOSIS — N40.1 BENIGN LOCALIZED PROSTATIC HYPERPLASIA WITH LOWER URINARY TRACT SYMPTOMS (LUTS): ICD-10-CM

## 2020-07-16 LAB — PSA SERPL-MCNC: 0.9 NG/ML (ref 0–4)

## 2020-07-16 PROCEDURE — 84153 ASSAY OF PSA TOTAL: CPT

## 2020-07-18 PROBLEM — I86.1 LEFT VARICOCELE: Status: RESOLVED | Noted: 2019-07-30 | Resolved: 2020-07-18

## 2020-07-20 ENCOUNTER — TELEPHONE (OUTPATIENT)
Dept: UROLOGY | Facility: CLINIC | Age: 45
End: 2020-07-20

## 2020-07-20 ENCOUNTER — OFFICE VISIT (OUTPATIENT)
Dept: UROLOGY | Facility: CLINIC | Age: 45
End: 2020-07-20
Payer: MEDICARE

## 2020-07-20 VITALS
HEART RATE: 66 BPM | TEMPERATURE: 99 F | HEIGHT: 63 IN | DIASTOLIC BLOOD PRESSURE: 68 MMHG | BODY MASS INDEX: 23.88 KG/M2 | WEIGHT: 134.8 LBS | SYSTOLIC BLOOD PRESSURE: 130 MMHG

## 2020-07-20 DIAGNOSIS — N40.1 BENIGN LOCALIZED PROSTATIC HYPERPLASIA WITH LOWER URINARY TRACT SYMPTOMS (LUTS): Primary | ICD-10-CM

## 2020-07-20 PROCEDURE — 3008F BODY MASS INDEX DOCD: CPT | Performed by: UROLOGY

## 2020-07-20 PROCEDURE — 1036F TOBACCO NON-USER: CPT | Performed by: UROLOGY

## 2020-07-20 PROCEDURE — 99213 OFFICE O/P EST LOW 20 MIN: CPT | Performed by: UROLOGY

## 2020-07-20 NOTE — LETTER
July 20, 2020     Angle Gilmore PA-C  [address] [facility]    Patient: Garrett Larios   YOB: 1975   Date of Visit: 7/20/2020       Dear Dr Jocelin Bear: Thank you for referring Garrett Larios to me for evaluation  Below are my notes for this consultation  If you have questions, please do not hesitate to call me  I look forward to following your patient along with you  Sincerely,        Debi Renee MD        CC: No Recipients  Debi Renee MD  7/20/2020  1:04 PM  Sign at close encounter       Problem List Items Addressed This Visit        Genitourinary    Benign localized prostatic hyperplasia with lower urinary tract symptoms (LUTS) - Primary            Status post left varicocelectomy in August 2019, initially with excellent symptomatic control      Presents for follow-up today     Patient should take gabapentin, refer to pain management    Assessment and plan:       Please see problem oriented charting for the assessment plan of today's urological complaints    Debi Renee MD      Chief Complaint     Chief Complaint   Patient presents with    Benign Prostatic Hypertrophy    varicocele         History of Present Illness     Garrett Larios is a 38 y o  man with a history of left orchialgia as well as left varicocele, he is status post a subinguinal varicocelectomy on August 27, 2019  He was seen in January, at which point all of his symptoms had resolved, he presents today with continued lower urinary tract symptoms  His scrotal pain has completely resolved since undergoing varicocelectomy  He was also seen for some difficulty urinating, for this reason he was started on tamsulosin at his last visit    Since starting this medication in terms of his symptoms he states that he did not have much improvement in this, in terms of interest in further therapies for lower urinary tract symptoms he wishes to proceed to Uro lift if he is a candidate  New complaints include some burning at terminal urination, also slow urinary stream and hesitancy  The following portions of the patient's history were reviewed and updated as appropriate: allergies, current medications, past family history, past medical history, past social history, past surgical history and problem list     Detailed Urologic History     - please refer to HPI    Review of Systems     Review of Systems   Constitutional: Negative  HENT: Negative  Eyes: Negative  Respiratory: Negative  Cardiovascular: Negative  Gastrointestinal: Negative  Endocrine: Negative  Genitourinary: Positive for dysuria and urgency  Musculoskeletal: Negative  Skin: Negative  Allergic/Immunologic: Negative  Neurological: Negative  Hematological: Negative  Psychiatric/Behavioral: Negative  Allergies     No Known Allergies    Physical Exam     Physical Exam   Constitutional: He is oriented to person, place, and time  He appears well-developed and well-nourished  No distress  HENT:   Head: Normocephalic and atraumatic  Eyes: Right eye exhibits no discharge  Left eye exhibits no discharge  Neck: No tracheal deviation present  Cardiovascular: Intact distal pulses  Pulmonary/Chest: Effort normal  No stridor  No respiratory distress  Abdominal: He exhibits no distension  Musculoskeletal: He exhibits no deformity  Neurological: He is alert and oriented to person, place, and time  No cranial nerve deficit  Coordination normal    Skin: Skin is warm and dry  No rash noted  He is not diaphoretic  No erythema  No pallor  Psychiatric: He has a normal mood and affect  His behavior is normal  Judgment and thought content normal    Nursing note and vitals reviewed            Vital Signs  Vitals:    07/20/20 1243   BP: 130/68   Pulse: 66   Temp: 99 °F (37 2 °C)   Weight: 61 1 kg (134 lb 12 8 oz)   Height: 5' 3" (1 6 m)         Current Medications Current Outpatient Medications:     lamoTRIgine (LaMICtal) 25 mg tablet, , Disp: , Rfl:     QUEtiapine (SEROquel) 400 MG tablet, , Disp: , Rfl:     docusate sodium (COLACE) 100 mg capsule, Take 1 capsule (100 mg total) by mouth 3 (three) times a day for 14 days, Disp: 42 capsule, Rfl: 0    DULoxetine (CYMBALTA) 60 mg delayed release capsule, , Disp: , Rfl:     meloxicam (MOBIC) 7 5 mg tablet, Take 7 5 mg by mouth 2 (two) times a day, Disp: , Rfl: 1    tamsulosin (FLOMAX) 0 4 mg, Take 1 capsule (0 4 mg total) by mouth daily at bedtime, Disp: 30 capsule, Rfl: 3      Active Problems     Patient Active Problem List   Diagnosis    Bipolar disease, chronic (HCC)    Chronic bilateral low back pain with left-sided sciatica    Depression with anxiety    Fibromyalgia    Hypogonadism male    Chronic neck pain    Pure hypercholesterolemia    Other hemorrhoids    Chronic idiopathic constipation    Benign localized prostatic hyperplasia with lower urinary tract symptoms (LUTS)         Past Medical History     Past Medical History:   Diagnosis Date    Anxiety     Bipolar disorder (HCC)     Chronic back pain     with left sided sciatica    Constipation     Depression     Fibromyalgia, primary     Hemorrhoids     last assessed - [id number]    Hyperlipidemia          Surgical History     Past Surgical History:   Procedure Laterality Date    CERVICAL FUSION      Cervical Vertbral fusion    IN EXCISE VARICOCELE Left 8/27/2019    Procedure: VARICOCELECTOMY;  Surgeon: Verona Salinas MD;  Location: [facility];  Service: Urology         Family History     Family History   Problem Relation Age of Onset    Arthritis Mother     Hyperlipidemia Mother     Hyperthyroidism Father     Other Father         Prostate disease    Colon cancer Sister 40    Other Sister         Epilepsy    Asthma Brother          Social History     Social History     Social History     Tobacco Use   Smoking Status Never Smoker Smokeless Tobacco Never Used         Pertinent Lab Values     Lab Results   Component Value Date    CREATININE 1 30 06/29/2020       Lab Results   Component Value Date    PSA 0 9 07/16/2020             Pertinent Imaging      No new imaging for my review

## 2020-07-20 NOTE — TELEPHONE ENCOUNTER
Patient is to Return in about 4 weeks (around 8/17/2020) for cystoscopy, TRUS no biopsy- having a hard time finding a 4 week availability - please advise -ty

## 2020-10-14 ENCOUNTER — TELEPHONE (OUTPATIENT)
Dept: UROLOGY | Facility: CLINIC | Age: 45
End: 2020-10-14

## 2020-10-14 ENCOUNTER — PROCEDURE VISIT (OUTPATIENT)
Dept: UROLOGY | Facility: CLINIC | Age: 45
End: 2020-10-14
Payer: MEDICARE

## 2020-10-14 VITALS
DIASTOLIC BLOOD PRESSURE: 80 MMHG | HEART RATE: 56 BPM | SYSTOLIC BLOOD PRESSURE: 136 MMHG | WEIGHT: 131.6 LBS | BODY MASS INDEX: 23.32 KG/M2 | TEMPERATURE: 97.2 F | HEIGHT: 63 IN

## 2020-10-14 DIAGNOSIS — Z71.2 ENCOUNTER TO DISCUSS TEST RESULTS: ICD-10-CM

## 2020-10-14 DIAGNOSIS — N40.1 BENIGN LOCALIZED PROSTATIC HYPERPLASIA WITH LOWER URINARY TRACT SYMPTOMS (LUTS): Primary | ICD-10-CM

## 2020-10-14 PROCEDURE — 76872 US TRANSRECTAL: CPT | Performed by: UROLOGY

## 2020-10-14 PROCEDURE — 99214 OFFICE O/P EST MOD 30 MIN: CPT | Performed by: UROLOGY

## 2020-10-14 PROCEDURE — 52000 CYSTOURETHROSCOPY: CPT | Performed by: UROLOGY

## 2020-10-14 RX ORDER — GABAPENTIN 100 MG/1
300 CAPSULE ORAL ONCE
Status: CANCELLED | OUTPATIENT
Start: 2020-10-14 | End: 2020-10-14

## 2020-10-14 RX ORDER — ACETAMINOPHEN 325 MG/1
975 TABLET ORAL ONCE
Status: CANCELLED | OUTPATIENT
Start: 2020-10-14 | End: 2020-10-14

## 2020-10-16 ENCOUNTER — PREP FOR PROCEDURE (OUTPATIENT)
Dept: UROLOGY | Facility: CLINIC | Age: 45
End: 2020-10-16

## 2020-10-16 DIAGNOSIS — Z79.01 ANTICOAGULATED: ICD-10-CM

## 2020-10-16 DIAGNOSIS — N40.1 BENIGN LOCALIZED PROSTATIC HYPERPLASIA WITH LOWER URINARY TRACT SYMPTOMS (LUTS): Primary | ICD-10-CM

## 2020-11-06 ENCOUNTER — LAB (OUTPATIENT)
Dept: LAB | Facility: HOSPITAL | Age: 45
End: 2020-11-06
Attending: UROLOGY
Payer: MEDICARE

## 2020-11-06 DIAGNOSIS — Z79.01 ANTICOAGULATED: ICD-10-CM

## 2020-11-06 DIAGNOSIS — N40.1 BENIGN LOCALIZED PROSTATIC HYPERPLASIA WITH LOWER URINARY TRACT SYMPTOMS (LUTS): ICD-10-CM

## 2020-11-06 LAB
ABO GROUP BLD: NORMAL
ANION GAP SERPL CALCULATED.3IONS-SCNC: 0 MMOL/L (ref 4–13)
APTT PPP: 29 SECONDS (ref 23–37)
BASOPHILS # BLD AUTO: 0.02 THOUSANDS/ΜL (ref 0–0.1)
BASOPHILS NFR BLD AUTO: 1 % (ref 0–1)
BLD GP AB SCN SERPL QL: NEGATIVE
BUN SERPL-MCNC: 18 MG/DL (ref 5–25)
CALCIUM SERPL-MCNC: 9.8 MG/DL (ref 8.3–10.1)
CHLORIDE SERPL-SCNC: 109 MMOL/L (ref 100–108)
CO2 SERPL-SCNC: 34 MMOL/L (ref 21–32)
CREAT SERPL-MCNC: 1.18 MG/DL (ref 0.6–1.3)
EOSINOPHIL # BLD AUTO: 0.12 THOUSAND/ΜL (ref 0–0.61)
EOSINOPHIL NFR BLD AUTO: 3 % (ref 0–6)
ERYTHROCYTE [DISTWIDTH] IN BLOOD BY AUTOMATED COUNT: 12.4 % (ref 11.6–15.1)
GFR SERPL CREATININE-BSD FRML MDRD: 74 ML/MIN/1.73SQ M
GLUCOSE P FAST SERPL-MCNC: 90 MG/DL (ref 65–99)
HCT VFR BLD AUTO: 43.2 % (ref 36.5–49.3)
HGB BLD-MCNC: 14.1 G/DL (ref 12–17)
IMM GRANULOCYTES # BLD AUTO: 0.01 THOUSAND/UL (ref 0–0.2)
IMM GRANULOCYTES NFR BLD AUTO: 0 % (ref 0–2)
INR PPP: 1.02 (ref 0.84–1.19)
LYMPHOCYTES # BLD AUTO: 1.62 THOUSANDS/ΜL (ref 0.6–4.47)
LYMPHOCYTES NFR BLD AUTO: 41 % (ref 14–44)
MCH RBC QN AUTO: 28.2 PG (ref 26.8–34.3)
MCHC RBC AUTO-ENTMCNC: 32.6 G/DL (ref 31.4–37.4)
MCV RBC AUTO: 86 FL (ref 82–98)
MONOCYTES # BLD AUTO: 0.52 THOUSAND/ΜL (ref 0.17–1.22)
MONOCYTES NFR BLD AUTO: 13 % (ref 4–12)
NEUTROPHILS # BLD AUTO: 1.68 THOUSANDS/ΜL (ref 1.85–7.62)
NEUTS SEG NFR BLD AUTO: 42 % (ref 43–75)
NRBC BLD AUTO-RTO: 0 /100 WBCS
PLATELET # BLD AUTO: 235 THOUSANDS/UL (ref 149–390)
PMV BLD AUTO: 10.3 FL (ref 8.9–12.7)
POTASSIUM SERPL-SCNC: 4.4 MMOL/L (ref 3.5–5.3)
PROTHROMBIN TIME: 13.4 SECONDS (ref 11.6–14.5)
RBC # BLD AUTO: 5 MILLION/UL (ref 3.88–5.62)
RH BLD: NEGATIVE
SODIUM SERPL-SCNC: 143 MMOL/L (ref 136–145)
SPECIMEN EXPIRATION DATE: NORMAL
WBC # BLD AUTO: 3.97 THOUSAND/UL (ref 4.31–10.16)

## 2020-11-06 PROCEDURE — 85610 PROTHROMBIN TIME: CPT

## 2020-11-06 PROCEDURE — 86900 BLOOD TYPING SEROLOGIC ABO: CPT

## 2020-11-06 PROCEDURE — 86901 BLOOD TYPING SEROLOGIC RH(D): CPT

## 2020-11-06 PROCEDURE — 36415 COLL VENOUS BLD VENIPUNCTURE: CPT

## 2020-11-06 PROCEDURE — 86850 RBC ANTIBODY SCREEN: CPT

## 2020-11-06 PROCEDURE — 85025 COMPLETE CBC W/AUTO DIFF WBC: CPT

## 2020-11-06 PROCEDURE — 85730 THROMBOPLASTIN TIME PARTIAL: CPT

## 2020-11-06 PROCEDURE — 80048 BASIC METABOLIC PNL TOTAL CA: CPT

## 2020-11-10 ENCOUNTER — APPOINTMENT (OUTPATIENT)
Dept: LAB | Facility: HOSPITAL | Age: 45
End: 2020-11-10
Attending: UROLOGY
Payer: MEDICARE

## 2020-11-10 DIAGNOSIS — N40.1 BENIGN LOCALIZED PROSTATIC HYPERPLASIA WITH LOWER URINARY TRACT SYMPTOMS (LUTS): ICD-10-CM

## 2020-11-10 PROCEDURE — U0003 INFECTIOUS AGENT DETECTION BY NUCLEIC ACID (DNA OR RNA); SEVERE ACUTE RESPIRATORY SYNDROME CORONAVIRUS 2 (SARS-COV-2) (CORONAVIRUS DISEASE [COVID-19]), AMPLIFIED PROBE TECHNIQUE, MAKING USE OF HIGH THROUGHPUT TECHNOLOGIES AS DESCRIBED BY CMS-2020-01-R: HCPCS | Performed by: UROLOGY

## 2020-11-10 PROCEDURE — 87086 URINE CULTURE/COLONY COUNT: CPT

## 2020-11-11 LAB
BACTERIA UR CULT: NORMAL
SARS-COV-2 RNA SPEC QL NAA+PROBE: NOT DETECTED

## 2020-11-16 ENCOUNTER — ANESTHESIA EVENT (OUTPATIENT)
Dept: PERIOP | Facility: HOSPITAL | Age: 45
End: 2020-11-16
Payer: MEDICARE

## 2020-11-17 ENCOUNTER — HOSPITAL ENCOUNTER (OUTPATIENT)
Facility: HOSPITAL | Age: 45
Setting detail: OUTPATIENT SURGERY
Discharge: HOME | End: 2020-11-17
Attending: UROLOGY | Admitting: UROLOGY
Payer: MEDICARE

## 2020-11-17 ENCOUNTER — ANESTHESIA (OUTPATIENT)
Dept: PERIOP | Facility: HOSPITAL | Age: 45
End: 2020-11-17
Payer: MEDICARE

## 2020-11-17 ENCOUNTER — TELEPHONE (OUTPATIENT)
Dept: UROLOGY | Facility: CLINIC | Age: 45
End: 2020-11-17

## 2020-11-17 VITALS — HEART RATE: 52 BPM

## 2020-11-17 VITALS
HEIGHT: 63 IN | DIASTOLIC BLOOD PRESSURE: 70 MMHG | WEIGHT: 131 LBS | BODY MASS INDEX: 23.21 KG/M2 | HEART RATE: 62 BPM | OXYGEN SATURATION: 98 % | RESPIRATION RATE: 20 BRPM | SYSTOLIC BLOOD PRESSURE: 131 MMHG | TEMPERATURE: 97.2 F

## 2020-11-17 DIAGNOSIS — N40.1 BENIGN LOCALIZED PROSTATIC HYPERPLASIA WITH LOWER URINARY TRACT SYMPTOMS (LUTS): Primary | ICD-10-CM

## 2020-11-17 PROCEDURE — 52648 LASER SURGERY OF PROSTATE: CPT | Performed by: UROLOGY

## 2020-11-17 PROCEDURE — NC001 PR NO CHARGE: Performed by: UROLOGY

## 2020-11-17 RX ORDER — OXYBUTYNIN CHLORIDE 5 MG/1
5 TABLET ORAL 3 TIMES DAILY PRN
Status: CANCELLED | OUTPATIENT
Start: 2020-11-17

## 2020-11-17 RX ORDER — CEFAZOLIN SODIUM 2 G/50ML
SOLUTION INTRAVENOUS AS NEEDED
Status: DISCONTINUED | OUTPATIENT
Start: 2020-11-17 | End: 2020-11-17

## 2020-11-17 RX ORDER — HYDROMORPHONE HCL/PF 1 MG/ML
0.5 SYRINGE (ML) INJECTION
Status: DISCONTINUED | OUTPATIENT
Start: 2020-11-17 | End: 2020-11-17 | Stop reason: HOSPADM

## 2020-11-17 RX ORDER — MAGNESIUM HYDROXIDE 1200 MG/15ML
LIQUID ORAL AS NEEDED
Status: DISCONTINUED | OUTPATIENT
Start: 2020-11-17 | End: 2020-11-17 | Stop reason: HOSPADM

## 2020-11-17 RX ORDER — OXYCODONE HYDROCHLORIDE AND ACETAMINOPHEN 5; 325 MG/1; MG/1
1 TABLET ORAL EVERY 4 HOURS PRN
Qty: 8 TABLET | Refills: 0 | Status: SHIPPED | OUTPATIENT
Start: 2020-11-17 | End: 2020-11-22

## 2020-11-17 RX ORDER — KETAMINE HYDROCHLORIDE 50 MG/ML
INJECTION, SOLUTION, CONCENTRATE INTRAMUSCULAR; INTRAVENOUS AS NEEDED
Status: DISCONTINUED | OUTPATIENT
Start: 2020-11-17 | End: 2020-11-17

## 2020-11-17 RX ORDER — HYDRALAZINE HYDROCHLORIDE 20 MG/ML
5 INJECTION INTRAMUSCULAR; INTRAVENOUS
Status: DISCONTINUED | OUTPATIENT
Start: 2020-11-17 | End: 2020-11-17 | Stop reason: HOSPADM

## 2020-11-17 RX ORDER — DIPHENHYDRAMINE HCL 25 MG
25 TABLET ORAL EVERY 6 HOURS PRN
Status: CANCELLED | OUTPATIENT
Start: 2020-11-17

## 2020-11-17 RX ORDER — SODIUM CHLORIDE, SODIUM LACTATE, POTASSIUM CHLORIDE, CALCIUM CHLORIDE 600; 310; 30; 20 MG/100ML; MG/100ML; MG/100ML; MG/100ML
INJECTION, SOLUTION INTRAVENOUS CONTINUOUS PRN
Status: DISCONTINUED | OUTPATIENT
Start: 2020-11-17 | End: 2020-11-17

## 2020-11-17 RX ORDER — ALBUTEROL SULFATE 2.5 MG/3ML
2.5 SOLUTION RESPIRATORY (INHALATION) ONCE AS NEEDED
Status: DISCONTINUED | OUTPATIENT
Start: 2020-11-17 | End: 2020-11-17 | Stop reason: HOSPADM

## 2020-11-17 RX ORDER — ACETAMINOPHEN 325 MG/1
650 TABLET ORAL EVERY 6 HOURS PRN
Status: CANCELLED | OUTPATIENT
Start: 2020-11-17

## 2020-11-17 RX ORDER — GABAPENTIN 300 MG/1
300 CAPSULE ORAL ONCE
Status: COMPLETED | OUTPATIENT
Start: 2020-11-17 | End: 2020-11-17

## 2020-11-17 RX ORDER — PROMETHAZINE HYDROCHLORIDE 25 MG/ML
12.5 INJECTION, SOLUTION INTRAMUSCULAR; INTRAVENOUS ONCE AS NEEDED
Status: DISCONTINUED | OUTPATIENT
Start: 2020-11-17 | End: 2020-11-17 | Stop reason: HOSPADM

## 2020-11-17 RX ORDER — METOCLOPRAMIDE HYDROCHLORIDE 5 MG/ML
10 INJECTION INTRAMUSCULAR; INTRAVENOUS ONCE AS NEEDED
Status: DISCONTINUED | OUTPATIENT
Start: 2020-11-17 | End: 2020-11-17 | Stop reason: HOSPADM

## 2020-11-17 RX ORDER — ONDANSETRON 2 MG/ML
4 INJECTION INTRAMUSCULAR; INTRAVENOUS ONCE AS NEEDED
Status: DISCONTINUED | OUTPATIENT
Start: 2020-11-17 | End: 2020-11-17 | Stop reason: HOSPADM

## 2020-11-17 RX ORDER — LABETALOL 20 MG/4 ML (5 MG/ML) INTRAVENOUS SYRINGE
10
Status: DISCONTINUED | OUTPATIENT
Start: 2020-11-17 | End: 2020-11-17 | Stop reason: HOSPADM

## 2020-11-17 RX ORDER — HYDROMORPHONE HCL/PF 1 MG/ML
0.2 SYRINGE (ML) INJECTION
Status: DISCONTINUED | OUTPATIENT
Start: 2020-11-17 | End: 2020-11-17 | Stop reason: HOSPADM

## 2020-11-17 RX ORDER — OXYCODONE HYDROCHLORIDE 5 MG/1
5 TABLET ORAL EVERY 4 HOURS PRN
Status: CANCELLED | OUTPATIENT
Start: 2020-11-17

## 2020-11-17 RX ORDER — DOCUSATE SODIUM 100 MG/1
100 CAPSULE, LIQUID FILLED ORAL 3 TIMES DAILY
Qty: 21 CAPSULE | Refills: 0 | Status: SHIPPED | OUTPATIENT
Start: 2020-11-17 | End: 2022-05-16 | Stop reason: ALTCHOICE

## 2020-11-17 RX ORDER — LIDOCAINE HYDROCHLORIDE 20 MG/ML
INJECTION, SOLUTION EPIDURAL; INFILTRATION; INTRACAUDAL; PERINEURAL AS NEEDED
Status: DISCONTINUED | OUTPATIENT
Start: 2020-11-17 | End: 2020-11-17

## 2020-11-17 RX ORDER — PHENAZOPYRIDINE HYDROCHLORIDE 200 MG/1
200 TABLET, FILM COATED ORAL
Qty: 6 TABLET | Refills: 0 | Status: SHIPPED | OUTPATIENT
Start: 2020-11-17 | End: 2020-11-19

## 2020-11-17 RX ORDER — FENTANYL CITRATE 50 UG/ML
INJECTION, SOLUTION INTRAMUSCULAR; INTRAVENOUS AS NEEDED
Status: DISCONTINUED | OUTPATIENT
Start: 2020-11-17 | End: 2020-11-17

## 2020-11-17 RX ORDER — ACETAMINOPHEN 325 MG/1
975 TABLET ORAL ONCE
Status: COMPLETED | OUTPATIENT
Start: 2020-11-17 | End: 2020-11-17

## 2020-11-17 RX ORDER — SULFAMETHOXAZOLE AND TRIMETHOPRIM 800; 160 MG/1; MG/1
1 TABLET ORAL EVERY 12 HOURS SCHEDULED
Qty: 4 TABLET | Refills: 0 | Status: SHIPPED | OUTPATIENT
Start: 2020-11-17 | End: 2020-11-19

## 2020-11-17 RX ORDER — ONDANSETRON 2 MG/ML
4 INJECTION INTRAMUSCULAR; INTRAVENOUS EVERY 6 HOURS PRN
Status: CANCELLED | OUTPATIENT
Start: 2020-11-17

## 2020-11-17 RX ORDER — LIDOCAINE HYDROCHLORIDE 10 MG/ML
0.5 INJECTION, SOLUTION EPIDURAL; INFILTRATION; INTRACAUDAL; PERINEURAL ONCE AS NEEDED
Status: DISCONTINUED | OUTPATIENT
Start: 2020-11-17 | End: 2020-11-17 | Stop reason: HOSPADM

## 2020-11-17 RX ORDER — DEXAMETHASONE SODIUM PHOSPHATE 4 MG/ML
INJECTION, SOLUTION INTRA-ARTICULAR; INTRALESIONAL; INTRAMUSCULAR; INTRAVENOUS; SOFT TISSUE AS NEEDED
Status: DISCONTINUED | OUTPATIENT
Start: 2020-11-17 | End: 2020-11-17

## 2020-11-17 RX ORDER — PHENAZOPYRIDINE HYDROCHLORIDE 100 MG/1
100 TABLET, FILM COATED ORAL
Status: CANCELLED | OUTPATIENT
Start: 2020-11-17

## 2020-11-17 RX ORDER — FENTANYL CITRATE/PF 50 MCG/ML
25 SYRINGE (ML) INJECTION
Status: DISCONTINUED | OUTPATIENT
Start: 2020-11-17 | End: 2020-11-17 | Stop reason: HOSPADM

## 2020-11-17 RX ORDER — CEFAZOLIN SODIUM 1 G/50ML
1000 SOLUTION INTRAVENOUS ONCE
Status: DISCONTINUED | OUTPATIENT
Start: 2020-11-17 | End: 2020-11-17 | Stop reason: HOSPADM

## 2020-11-17 RX ORDER — ONDANSETRON 2 MG/ML
INJECTION INTRAMUSCULAR; INTRAVENOUS AS NEEDED
Status: DISCONTINUED | OUTPATIENT
Start: 2020-11-17 | End: 2020-11-17

## 2020-11-17 RX ORDER — PROPOFOL 10 MG/ML
INJECTION, EMULSION INTRAVENOUS AS NEEDED
Status: DISCONTINUED | OUTPATIENT
Start: 2020-11-17 | End: 2020-11-17

## 2020-11-17 RX ORDER — MIDAZOLAM HYDROCHLORIDE 2 MG/2ML
INJECTION, SOLUTION INTRAMUSCULAR; INTRAVENOUS AS NEEDED
Status: DISCONTINUED | OUTPATIENT
Start: 2020-11-17 | End: 2020-11-17

## 2020-11-17 RX ORDER — KETOROLAC TROMETHAMINE 30 MG/ML
15 INJECTION, SOLUTION INTRAMUSCULAR; INTRAVENOUS EVERY 6 HOURS PRN
Status: CANCELLED | OUTPATIENT
Start: 2020-11-17 | End: 2020-11-19

## 2020-11-17 RX ADMIN — HYDROMORPHONE HYDROCHLORIDE 0.2 MG: 1 INJECTION, SOLUTION INTRAMUSCULAR; INTRAVENOUS; SUBCUTANEOUS at 13:33

## 2020-11-17 RX ADMIN — PROPOFOL 200 MG: 10 INJECTION, EMULSION INTRAVENOUS at 11:44

## 2020-11-17 RX ADMIN — FENTANYL CITRATE 25 MCG: 50 INJECTION INTRAMUSCULAR; INTRAVENOUS at 12:00

## 2020-11-17 RX ADMIN — LIDOCAINE HYDROCHLORIDE 50 MG: 20 INJECTION, SOLUTION EPIDURAL; INFILTRATION; INTRACAUDAL at 11:44

## 2020-11-17 RX ADMIN — SODIUM CHLORIDE, SODIUM LACTATE, POTASSIUM CHLORIDE, AND CALCIUM CHLORIDE: .6; .31; .03; .02 INJECTION, SOLUTION INTRAVENOUS at 11:39

## 2020-11-17 RX ADMIN — FENTANYL CITRATE 25 MCG: 50 INJECTION INTRAMUSCULAR; INTRAVENOUS at 13:08

## 2020-11-17 RX ADMIN — ACETAMINOPHEN 975 MG: 325 TABLET, FILM COATED ORAL at 11:22

## 2020-11-17 RX ADMIN — KETAMINE HYDROCHLORIDE 50 MG: 50 INJECTION INTRAMUSCULAR; INTRAVENOUS at 11:44

## 2020-11-17 RX ADMIN — FENTANYL CITRATE 25 MCG: 50 INJECTION INTRAMUSCULAR; INTRAVENOUS at 12:59

## 2020-11-17 RX ADMIN — HYDROMORPHONE HYDROCHLORIDE 0.5 MG: 1 INJECTION, SOLUTION INTRAMUSCULAR; INTRAVENOUS; SUBCUTANEOUS at 13:45

## 2020-11-17 RX ADMIN — MIDAZOLAM HYDROCHLORIDE 2 MG: 1 INJECTION, SOLUTION INTRAMUSCULAR; INTRAVENOUS at 11:36

## 2020-11-17 RX ADMIN — CEFAZOLIN SODIUM 2000 MG: 2 SOLUTION INTRAVENOUS at 11:39

## 2020-11-17 RX ADMIN — FENTANYL CITRATE 25 MCG: 50 INJECTION INTRAMUSCULAR; INTRAVENOUS at 12:13

## 2020-11-17 RX ADMIN — PHENYLEPHRINE HYDROCHLORIDE 100 MCG: 10 INJECTION INTRAVENOUS at 11:59

## 2020-11-17 RX ADMIN — ONDANSETRON 4 MG: 2 INJECTION INTRAMUSCULAR; INTRAVENOUS at 11:50

## 2020-11-17 RX ADMIN — HYDROMORPHONE HYDROCHLORIDE 0.5 MG: 1 INJECTION, SOLUTION INTRAMUSCULAR; INTRAVENOUS; SUBCUTANEOUS at 14:00

## 2020-11-17 RX ADMIN — PHENYLEPHRINE HYDROCHLORIDE 200 MCG: 10 INJECTION INTRAVENOUS at 12:02

## 2020-11-17 RX ADMIN — DEXAMETHASONE SODIUM PHOSPHATE 4 MG: 4 INJECTION, SOLUTION INTRAMUSCULAR; INTRAVENOUS at 11:50

## 2020-11-17 RX ADMIN — HYDROMORPHONE HYDROCHLORIDE 0.2 MG: 1 INJECTION, SOLUTION INTRAMUSCULAR; INTRAVENOUS; SUBCUTANEOUS at 13:21

## 2020-11-17 RX ADMIN — GABAPENTIN 300 MG: 300 CAPSULE ORAL at 11:22

## 2020-11-19 ENCOUNTER — PROCEDURE VISIT (OUTPATIENT)
Dept: UROLOGY | Facility: CLINIC | Age: 45
End: 2020-11-19
Payer: MEDICARE

## 2020-11-19 VITALS
DIASTOLIC BLOOD PRESSURE: 80 MMHG | HEART RATE: 61 BPM | BODY MASS INDEX: 24.1 KG/M2 | SYSTOLIC BLOOD PRESSURE: 130 MMHG | TEMPERATURE: 98 F | HEIGHT: 63 IN | WEIGHT: 136 LBS

## 2020-11-19 DIAGNOSIS — N40.1 BENIGN LOCALIZED PROSTATIC HYPERPLASIA WITH LOWER URINARY TRACT SYMPTOMS (LUTS): Primary | ICD-10-CM

## 2020-11-19 LAB — POST-VOID RESIDUAL VOLUME, ML POC: 0 ML

## 2020-11-19 PROCEDURE — 51798 US URINE CAPACITY MEASURE: CPT

## 2020-11-19 PROCEDURE — 99024 POSTOP FOLLOW-UP VISIT: CPT

## 2021-01-01 NOTE — PATIENT INSTRUCTIONS
Ayuda para la ilene de decisiones en casos de hiperplasia prostática benigna   LO QUE NECESITA SABER:   ¿Qué necesito saber acerca de la ilene de decisiones en los casos de hiperplasia prostática benigna (HPB)? Puede trabajar con zuniga médico para silviano decisiones acerca de la detección o el tratamiento de la HPB  La detección es irma prueba que se realiza para determinar la presencia de HPB de manera temprana  La detección es diferente del diagnóstico porque la detección se utiliza cuando comienza a tener signos o síntomas  [place] implica que el control o el tratamiento puede comenzar de Anna Marie temprana  También puede ayudar a planificar el tratamiento si se determina la presencia de HPB en la detección o si esta se desarrolla luego  Sarah opciones de tratamiento incluyen la cirugía y las opciones no quirúrgicas  Es probable que zuniga médico le recomiende los tratamientos no quirúrgicos kristine  Conozca los beneficios y los riesgos de la [place] y del tratamiento no quirúrgico para que pueda silviano irma decisión informada  ¿Qué michelle saber acerca de la HPB? · La HBP es un agrandamiento de la próstata  La próstata es irma pequeña glándula que forma parte del sistema reproductivo  Se encuentra cerca de la uretra (tubo por donde la orina sale de la vejiga)  La próstata generalmente es del tamaño de [place]  Un agrandamiento de la próstata hará que esta ejerza presión sobre la uretra  [place] puede causar problemas con el almacenamiento de la orina o la eliminación total de la orina  · La HPB es frecuente en hombres mayores de 40 años  El riesgo aumenta con la edad  · Reynaldo significa que no es cáncer  La HBP no es irma afección potencialmente mortal, maxwell puede causar problemas con sarah actividades diarias  La HPB generalmente empeora con el paso del [place]   Sin tratamiento, la HPB también puede provocar la presencia de melisa en la orina, cálculos en la vejiga o insuficiencia renal     · Hable con zuniga médico si usted Bedside and Verbal shift change report given to Asaf [address] (oncoming nurse) by Asuncion Felix (offgoing nurse). Report included the following information SBAR, Kardex and ED Summary. piensa que tiene signos o síntomas de HPB  Bad [place] son problemas para comenzar a [place] o irma necesidad urgente de orinar  Puede despertarse por la noche debido a la necesidad de orinar  ¿Soy un buen candidato para la detección de la HPB? La detección puede ser útil para usted si algo de lo siguiente es verdadero:  · Tiene 40 años o más de [place]  · Usted tiene antecedentes familiares de HPB u otros problemas de próstata  · Usted tiene síntomas de la HPB que le causan problemas en will actividades diarias o zuniga calidad de qian  · Usted quiere recibir tratamiento tan pronto hilda sea posible, si es necesario  · Usted tiene enfermedad cardíaca o ilene un medicamento betabloqueante  ¿Cómo se realiza la detección? · La Puntuación internacional de síntomas de la próstata (International Prostate Symptom Score)  es un conjunto de preguntas sobre zuniga capacidad de orinar hamida el mes anterior  Se le preguntará con qué frecuencia presentó algo de lo que se detalla a continuación:     ¨ Sensación de no vaciar completamente la vejiga al orinar    ¨ Necesidad de orinar nuevamente dentro de un lapso de 2 horas después de la última vez que orinó    ¨ Flujo de orina que comienza y se detiene varias veces al orinar    ¨ Necesidad urgente de orinar que no puede posponer    ¨ Flujo de orina débil    ¨ Problemas para comenzar a orinar o necesidad de empujar o esforzarse para comenzar a hacerlo    ¨ Despertarse debido a la necesidad de orinar    · Un tacto rectal  se utiliza para comprobar el tamaño de la próstata  Zuniga médico introducirá un dedo cubierto con un guante en el recto  El médico podrá palpar la próstata  El examen se puede repetir con el paso del tiempo para verificar el tamaño de la próstata  · Un examen de APE  se utiliza para determinar la cantidad de irma proteína que la glándula prostática produce  Se ilene irma muestra de melisa para sue examen   Un nivel alto de APE puede aumentar el riesgo de padecer problemas más graves para orinar o la necesidad de someterse a [facility]  ¿Cuáles son los beneficios y los riesgos de la detección? Hable con zuniga médico sobre los riesgos y los beneficios de la detección:  · Los beneficios  incluyen el hallazgo de la HPB de Anna Marie temprana  [place] significa que puede silviano [employer] tratamientos  El examen de APE también puede detectar la presencia de cáncer de próstata de manera temprana  El tratamiento para el cáncer de próstata es más exitoso cuando se comienza de Anna Marie temprana  · Los riesgos  incluyen irma falsa creencia de que no desarrollará HPB si el resultado de la detección es negativo  Aunque los resultados de la detección jazzy negativos, usted puede desarrollar HPB más adelante  También puede necesitar realizarse otros exámenes si tiene problemas para orinar, maxwell según la detección no tiene HPB  ¿Qué preguntas michelle hacerle a mi médico para que me ayude a silviano decisiones sobre la detección?   · ¿Qué riesgo presento de tener HPB? · ¿Con qué frecuencia michelle realizarme la detección? · ¿Dónde se realiza la detección? · ¿Necesito hacer algo para estar listo para someterme a la detección?  ¿Qué sucede después de la detección? Usted se reunirá con zuniga médico para repasar los [employer] de la detección  Aris vez necsite más pruebas para diagnosticar cualquier problema que se haya observado en la detección  Las pruebas comunes incluyen un análisis de orina para detectar la presencia de infecciones o irma biopsia (muestra de tejido) para detectar la presencia de cáncer  También puede necesitar exámenes para determinar la cantidad de orina que permanece en la vejiga después de orinar  Además, se puede medir la fuerza del flujo de la [place]  Usted y zuniga médico pueden hablar acerca de will opciones de [place]  Juntos pueden decidir qué tratamiento es adecuado para usted  ¿Cómo se trata la HPB? ¿Cuáles son los beneficios del tratamiento? · Conducta expectante  significa que no recibirá tratamiento de inmediato  Se controlarán will signos y síntomas con el paso del tiempo para harry si Dalton Cumber  Zuniga médico puede recomendarle maneras de mejorar will síntomas o de realizar un seguimiento de ellos hamida la conducta expectante  Michelle Needles son beber menos líquido u orinar en un horario regular  El [address] puede recomendarle cambios de estilo de qian  Por ejemplo, los síntomas pueden mejorar si pierde peso o consume menos cafeína si es necesario  Se le podría pedir que registre will síntomas y los momentos en que [place]  El registro incluirá el momento en que orina, la dificultad que le presentó orinar y cualquier cambio que note en la micción  Aldon Prior registro a las consultas de seguimiento para ayudarlos a usted y a zuniga médico a decidir qué tratamiento puede intentar  · Medicamentos,  pueden administrarse para ayudarlo a controlar will síntomas y para evitar que la HBP empeore  Los medicamentos pueden ayudar a relajar ciertos músculos para hacer que orinar le resulte más fácil  También pueden necesitar medicamentos para hacer que la próstata se achique o para aliviar la hiperactividad de la vejiga  Los medicamentos pueden comenzar a aliviar los síntomas rápidamente  Estos podrían mejorar zuniga calidad de qian  Usted puede ser capaz de controlar will síntomas sin cirugía si los medicamentos impiden que la HPB empeore  · Cirugía  puede realizarse para [employer] síntomas si otros tratamientos no dan [place]  Irma ablación es irma cirugía en la que se utiliza irma aguja para eliminar el tejido adicional que está causando los síntomas  O leela, puede usarse un láser para eliminar el tejido  La próstata puede calentarse  Se introduce irma herramienta que emite calor en la uretra  Se destruye el tejido de la próstata, y ningún otro tejido sufre daños  Hamida otro tipo de [place], se pueden extraer partes de la próstata    ¿Cuáles son los riesgos del tratamiento para la HPB? · Conducta expectante  puede permitir que la HPB empeore y sea más difícil de tratar que en irma etapa temprana  Si usted tiene un nivel alto de APE, es posible que deba recibir tratamiento para la HPB de inmediato  · Medicamentos,  pueden provocar ciertos efectos secundarios, hilda la disfunción eréctil o la disminución del deseo sexual  También puede desarrollar retención urinaria (problemas para comenzar a orinar)  Algunos medicamentos pueden causar hipotensión (presión sanguínea baja) cuando se pone de pie o mareos  · Cirugía  puede dañar el tejido que se encuentra alrededor de la próstata  La cirugía también puede aumentar el riesgo de presentar dificultad para orinar, incontinencia (escapes) o retención Marveen Miu  Podría sangrar más de lo esperado hamida la cirugía o contraer irma infección  También es posible que deba recibir tratamiento otra vez si la cirugía no milagros will síntomas  ¿Qué preguntas michelle hacerle a mi médico para que me ayude a silviano decisiones sobre el tratamiento? · ¿Cómo me sentiré si sigo teniendo estos síntomas hamida el aniya de mi qian? · ¿Qué medicamentos pueden agatha mejores resultados para tratar mis síntomas? · ¿Qué otras medidas puedo silviano para disminuir los síntomas? · ¿Voy a tener que silviano medicamentos hamida el aniya de mi qian? · ¿Qué efectos secundarios podrían ocurrir con cada medicamento que puedo silviano? · ¿Soy un buen candidato para la cirugía? · ¿Qué cirugía puede agatha mejores resultados para tratar mis síntomas? · ¿Dónde se realiza la cirugía? · ¿Cuánto dura la recuperación tras la cirugía? · ¿Cuáles son [place]? ACUERDOS SOBRE ZUNIGA CUIDADO:   Usted tiene el derecho de ayudar a planear zuniga cuidado  Aprenda todo lo que pueda sobre zuniga condición y hilda darle tratamiento  Discuta will opciones de tratamiento con will médicos para decidir el cuidado que usted desea recibir   Jory Sweeney siempre tiene el derecho de rechazar el tratamiento  Esta información es sólo para uso en educación  Zuniga intención no es darle un consejo médico sobre enfermedades o tratamientos  Colsulte con zuniga Olya Angst farmacéutico antes de seguir cualquier régimen médico para saber si es seguro y efectivo para usted  © 2017 [address] Information is for End User's use only and may not be sold, redistributed or otherwise used for commercial purposes  All illustrations and images included in CareNotes® are the copyrighted property of A D A M , Inc  or Barron Andersen

## 2021-01-05 NOTE — PATIENT INSTRUCTIONS
Decision Aid for Benign Prostatic Hyperplasia   WHAT YOU NEED TO KNOW:   What do I need to know about decisions for benign prostatic hyperplasia (BPH)? You can work with your healthcare provider to make decisions about being screened or treated for BPH  Screening is a test done to find BPH early  Screening is different from diagnosis  Screening is used if you are at increased risk for BPH but do not have symptoms  This means management or treatment can start early  You can also help plan treatment if BPH is found with screening, or you develop it later on  Your treatment choices include nonsurgical options and surgery  Your healthcare provider may recommend nonsurgical treatments first  Learn about the benefits and risks of nonsurgical treatment and surgery so you can make an informed choice  What do I need to know about BPH?   · BPH is an enlarged prostate  The prostate is normally the size of a walnut and wraps around the urethra  An enlarged prostate will press on the urethra  This may cause problems with storing urine or emptying your bladder completely  · BPH is common in men older than 40 years  The risk increases with age  · Benign means it is not cancer  BPH is not a life-threatening condition, but it can cause problems with your daily activities  BPH usually gets worse over time  Left untreated, BPH can also lead to blood in your urine, bladder stones, or kidney failure  Am I a good candidate for BPH screening? Screening may be helpful for you if any of the following is true:  · You are 40 years or older  · You have a family history of BPH or other prostate problems  · You have a medical condition such as a heart disease or type 2 diabetes, or you are obese  · You do not get much physical activity  · You want to have treatment as early as possible if needed  · You take a beta-blocker medicine  How is screening done?   Your healthcare provider will ask about your medical history and family history of prostate problems  If you are at increased risk, your provider may use any of the following to check for BPH:  · The International Prostate Symptom Score  is a set of questions about your ability to urinate over the past month  You will be asked how often you have any of the following:     ? Urinating 8 or more times each day    ? A feeling of not fully emptying your bladder when you urinate    ? An urgent need to urinate that you could not put off, or urinating again within 2 hours    ? Being woken from sleep because you needed to urinate    ? Trouble starting your urine flow, or a need to push or strain to get it to start    ? Urine that stops and starts several times when you urinate    ? A weak urine stream, or dribbling after you urinate    · A digital rectal exam  is used to check the size of your prostate  Your healthcare provider will insert a gloved finger into your rectum  The provider will be able to feel your prostate  The exam may be repeated over time to check the prostate size  · A PSA test  is used to measure the amount of a protein made by your prostate gland  A blood sample is taken for this test  A high PSA level can increase your risk for more severe urination problems or the need for surgery  What are the benefits and risks of screening? Talk with your healthcare provider about the risks and benefits of screening:  · Benefits  include finding BPH early  This means you can make more decisions about treatments  The PSA test can also find prostate cancer early  Treatment of prostate cancer is more successful when it starts early  · Risks  include a false belief that you will not develop BPH if your screening result is negative  Even though your screening result is negative, you may still develop it later on  You may also need more tests if you have problems urinating but screening shows you do not have BPH      What questions should I ask my healthcare provider to help me make decisions about screening? · How high is my risk for BPH? · How often do I need to have screening? · Where is the screening done? · Do I need to do anything to get ready to have screening? What happens after I have screening? You will meet with your healthcare provider to go over the results of your screening  You may need more tests to diagnose anything that showed up on the screening test  Common tests include a urine test to check for an infection or a biopsy (tissue sample) to check for cancer  You may also need tests to measure the amount of urine left in your bladder after you urinate  The force of your urine flow may also be measured  You and your healthcare provider can talk about your treatment options  Together you can decide which treatment is right for you  How is BPH treated, and what are the benefits of treatment? · Watchful waiting  means you do not receive treatment right away  Your signs and symptoms will be monitored over time to see if they get worse  You may be asked to keep a record  The record will include when you urinate, how easy or difficult it was, and any changes in urination  You will bring the record to follow-up visits  Your healthcare provider may also recommend ways to improve your symptoms during watchful waiting  · Medicines  may be given to help your symptoms and to prevent BPH from getting worse  Medicines may help relax certain muscles to make it easier for you to urinate  You may also need medicine to make your prostate smaller or to relieve an overactive bladder  Medicines may start to relieve your symptoms quickly  Medicines can improve your quality of life  You may also be able to manage your symptoms without surgery if medicine keeps your BPH from getting worse  · A procedure  may be used to place a stent into your urethra to hold it open  A stent is a short, tiny mesh tube   A prostatic urethral lift, or UroLift, may be used to hold the prostate away from the urethra  This makes the urethra wider so urine can pass through more easily  · Surgery  may be used to relieve your symptoms if other treatments do not work  An ablation is surgery that uses a needle to destroy extra tissue that is causing your symptoms  A laser may instead be used to destroy the tissue  Your prostate may be heated  A tool that gives off heat is inserted into your urethra  Prostate tissue is destroyed, and no other tissues are damaged  Parts of your prostate may be removed during another type of surgery  What are the risks of BPH treatment? · Watchful waiting  may allow BPH to get worse and be more difficult to treat than at an early stage  If you have a high PSA level, you may need to have BPH treated right away  · Medicines  can cause certain side effects, such as erectile dysfunction (ED) or a lowered sex drive  You may also develop urinary retention (trouble starting your urine to flow)  Some medicines can cause hypotension (low blood pressure) when you stand, or dizziness  · Surgery  can damage tissue around your prostate  Surgery can also increase your risk for trouble urinating, incontinence (leaking), or urinary retention  You may bleed more than expected during surgery or develop an infection  You may also need to be treated again if the surgery you have does not relieve your symptoms  What questions should I ask my provider to help me make decisions about treatment? · How will I feel if I continue to have these symptoms for the rest of my life? · Which medicines may work best to treat my symptoms? · What other steps can I take to decrease my symptoms? · Will I have to take medicine for the rest of my life? · What side effects might happen with each medicine I can try? · Am I a good candidate for surgery? · Which surgery may work best to treat my symptoms? · Where is the surgery done?     · How long is recovery after surgery? · What are the possible side effects of surgery? CARE AGREEMENT:   You have the right to help plan your care  Learn about your health condition and how it may be treated  Discuss treatment options with your healthcare providers to decide what care you want to receive  You always have the right to refuse treatment  The above information is an  only  It is not intended as medical advice for individual conditions or treatments  Talk to your doctor, nurse or pharmacist before following any medical regimen to see if it is safe and effective for you  © Copyright [address] Information is for End User's use only and may not be sold, redistributed or otherwise used for commercial purposes   All illustrations and images included in CareNotes® are the copyrighted property of [employer]  or [address] [place] [address]

## 2021-01-05 NOTE — PROGRESS NOTES
Problem List Items Addressed This Visit        Genitourinary    Benign localized prostatic hyperplasia with lower urinary tract symptoms (LUTS) - Primary    Relevant Orders    POCT Measure PVR (Completed)    PSA Total, Diagnostic            Discussion:    Arm on does doing well, his initial PVR was elevated, he was able to void again with a PVR of 154 milliliters  He feels that his urinary flow has improved greatly since his last intervention for transurethral vaporization of the prostate, he wishes to continue see us on a yearly basis, he will follow-up with a PSA prior in 1 year    Assessment and plan:     Please see problem oriented charting for the assessment plan of today's urological complaints'    Corrinne Scriver, MD      Chief Complaint     Chief Complaint   Patient presents with    Benign Prostatic Hypertrophy         History of Present Illness     Vero Granados is a 54 y o  gentleman with a history of transurethral vaporization of the prostate for a high and tight bladder neck, also with transurethral incision of the prostate at that time  Doing well, force of stream is much improved  He also previously underwent intervention from a surgical perspective for orchialgia, he has no orchialgia at this time  No new urologic complaints    The following portions of the patient's history were reviewed and updated as appropriate: allergies, current medications, past family history, past medical history, past social history, past surgical history and problem list     Detailed Urologic History     - please refer to HPI    Review of Systems     Review of Systems   Constitutional: Negative  HENT: Negative  Eyes: Negative  Respiratory: Negative  Cardiovascular: Negative  Gastrointestinal: Negative  Endocrine: Negative  Genitourinary: Negative  Musculoskeletal: Negative  Skin: Negative  Allergic/Immunologic: Negative  Neurological: Negative  Hematological: Negative  Psychiatric/Behavioral: Negative  Allergies     No Known Allergies    Physical Exam     Physical Exam  Vitals signs reviewed  Constitutional:       General: He is not in acute distress  Appearance: Normal appearance  He is normal weight  He is not ill-appearing, toxic-appearing or diaphoretic  HENT:      Head: Normocephalic and atraumatic  Eyes:      General: No scleral icterus  Right eye: No discharge  Left eye: No discharge  Cardiovascular:      Pulses: Normal pulses  Pulmonary:      Effort: Pulmonary effort is normal    Abdominal:      General: There is no distension  Palpations: There is no mass  Tenderness: There is no abdominal tenderness  Hernia: No hernia is present  Genitourinary:     Penis: Normal     Musculoskeletal: Normal range of motion  General: No swelling, tenderness, deformity or signs of injury  Skin:     General: Skin is warm  Neurological:      General: No focal deficit present  Mental Status: He is alert  Psychiatric:         Mood and Affect: Mood normal          Behavior: Behavior normal          Thought Content:  Thought content normal          Judgment: Judgment normal              Vital Signs  Vitals:    01/06/21 1250   BP: 124/80   BP Location: Left arm   Patient Position: Sitting   Cuff Size: Standard   Pulse: 64   Weight: 60 7 kg (133 lb 12 8 oz)   Height: 5' 3" (1 6 m)         Current Medications       Current Outpatient Medications:     lamoTRIgine (LaMICtal) 100 mg tablet, Take 100 mg by mouth 2 (two) times a day, Disp: , Rfl:     QUEtiapine (SEROquel) 400 MG tablet, , Disp: , Rfl:     docusate sodium (COLACE) 100 mg capsule, Take 1 capsule (100 mg total) by mouth 3 (three) times a day for 7 days, Disp: 21 capsule, Rfl: 0    DULoxetine (CYMBALTA) 60 mg delayed release capsule, , Disp: , Rfl:     lamoTRIgine (LaMICtal) 25 mg tablet, , Disp: , Rfl:     meloxicam (MOBIC) 7 5 mg tablet, Take 7 5 mg by mouth 2 (two) times a day, Disp: , Rfl: 1      Active Problems     Patient Active Problem List   Diagnosis    Bipolar disease, chronic (HCC)    Chronic bilateral low back pain with left-sided sciatica    Depression with anxiety    Fibromyalgia    Hypogonadism male    Chronic neck pain    Pure hypercholesterolemia    Other hemorrhoids    Chronic idiopathic constipation    Benign localized prostatic hyperplasia with lower urinary tract symptoms (LUTS)         Past Medical History     Past Medical History:   Diagnosis Date    Anxiety     Bipolar disorder ([address] )     Chronic back pain     with left sided sciatica    Constipation     Fibromyalgia, primary     Hemorrhoids     last assessed - [id number]    Hyperlipidemia          Surgical History     Past Surgical History:   Procedure Laterality Date    CERVICAL FUSION      Cervical Vertbral fusion    MT EXCISE VARICOCELE Left 8/27/2019    Procedure: VARICOCELECTOMY;  Surgeon: Alena Mayen MD;  Location: [facility];  Service: Urology    MT TRANSURETHRAL ELEC-SURG PROSTATECTOM N/A 11/17/2020    Procedure: TRANSURETHRAL VAPORIZATION OF PROSTATE (TURP);   Surgeon: Alena Mayen MD;  Location: [facility];  Service: Urology         Family History     Family History   Problem Relation Age of Onset    Arthritis Mother     Hyperlipidemia Mother     Hyperthyroidism Father     Other Father         Prostate disease    Colon cancer Sister 40    Other Sister         Epilepsy    Asthma Brother          Social History     Social History     Social History     Tobacco Use   Smoking Status Never Smoker   Smokeless Tobacco Never Used         Pertinent Lab Values     Lab Results   Component Value Date    CREATININE 1 18 11/06/2020       Lab Results   Component Value Date    PSA 0 9 07/16/2020             Pertinent Imaging      no new imaging for my review

## 2021-01-06 ENCOUNTER — OFFICE VISIT (OUTPATIENT)
Dept: UROLOGY | Facility: CLINIC | Age: 46
End: 2021-01-06
Payer: MEDICARE

## 2021-01-06 VITALS
HEART RATE: 64 BPM | WEIGHT: 133.8 LBS | SYSTOLIC BLOOD PRESSURE: 124 MMHG | BODY MASS INDEX: 23.71 KG/M2 | DIASTOLIC BLOOD PRESSURE: 80 MMHG | HEIGHT: 63 IN

## 2021-01-06 DIAGNOSIS — N40.1 BENIGN LOCALIZED PROSTATIC HYPERPLASIA WITH LOWER URINARY TRACT SYMPTOMS (LUTS): Primary | ICD-10-CM

## 2021-01-06 LAB — POST-VOID RESIDUAL VOLUME, ML POC: 156 ML

## 2021-01-06 PROCEDURE — 51798 US URINE CAPACITY MEASURE: CPT | Performed by: UROLOGY

## 2021-01-06 PROCEDURE — 99024 POSTOP FOLLOW-UP VISIT: CPT | Performed by: UROLOGY

## 2021-01-06 RX ORDER — LAMOTRIGINE 100 MG/1
100 TABLET ORAL 2 TIMES DAILY
COMMUNITY
Start: 2020-12-30 | End: 2022-05-16 | Stop reason: DRUGHIGH

## 2022-01-06 NOTE — PROGRESS NOTES
UROLOGY PROGRESS NOTE   Patient Identifiers: Sirena Awan (MRN [id number])  Date of Service: 1/6/2022    Subjective:     30-year-old male with history of transurethral vaporization of the prostate for high and tight bladder neck  He also had a transurethral sitting of the prostate at that time  He also had previous complaint of orchialgia which is no longer  present  His last PSA was 0 9  Reason for visit:  BPH follow-up    Objective:     VITALS:    There were no vitals filed for this visit  LABS:  Lab Results   Component Value Date    HGB 14 1 11/06/2020    HCT 43 2 11/06/2020    WBC 3 97 (L) 11/06/2020     11/06/2020   ]    Lab Results   Component Value Date    K 4 4 11/06/2020     (H) 11/06/2020    CO2 34 (H) 11/06/2020    BUN 18 11/06/2020    CREATININE 1 18 11/06/2020    CALCIUM 9 8 11/06/2020   ]        INPATIENT MEDS:    Current Outpatient Medications:     docusate sodium (COLACE) 100 mg capsule, Take 1 capsule (100 mg total) by mouth 3 (three) times a day for 7 days, Disp: 21 capsule, Rfl: 0    DULoxetine (CYMBALTA) 60 mg delayed release capsule, , Disp: , Rfl:     lamoTRIgine (LaMICtal) 100 mg tablet, Take 100 mg by mouth 2 (two) times a day, Disp: , Rfl:     lamoTRIgine (LaMICtal) 25 mg tablet, , Disp: , Rfl:     meloxicam (MOBIC) 7 5 mg tablet, Take 7 5 mg by mouth 2 (two) times a day, Disp: , Rfl: 1    QUEtiapine (SEROquel) 400 MG tablet, , Disp: , Rfl:       Physical Exam:   There were no vitals taken for this visit  GEN: no acute distress    RESP: breathing comfortably with no accessory muscle use    ABD: soft, non-tender, non-distended   INCISION:    EXT: no significant peripheral edema   (Male): Penis uncircumcised, phallus normal, meatus patent  Testicles descended into scrotum bilaterally without masses nor tenderness  No inguinal hernias bilaterally  RADIOLOGY:    none     Assessment:    1  BPH   2   History of left varicocelectomy     Plan: - may follow up in 2 years with PSA prior to visit  -  -  -

## 2022-01-07 ENCOUNTER — OFFICE VISIT (OUTPATIENT)
Dept: UROLOGY | Facility: CLINIC | Age: 47
End: 2022-01-07
Payer: MEDICARE

## 2022-01-07 VITALS
RESPIRATION RATE: 18 BRPM | BODY MASS INDEX: 23.07 KG/M2 | HEIGHT: 63 IN | DIASTOLIC BLOOD PRESSURE: 98 MMHG | WEIGHT: 130.2 LBS | SYSTOLIC BLOOD PRESSURE: 142 MMHG

## 2022-01-07 DIAGNOSIS — N40.1 BENIGN PROSTATIC HYPERPLASIA WITH LOWER URINARY TRACT SYMPTOMS, SYMPTOM DETAILS UNSPECIFIED: Primary | ICD-10-CM

## 2022-01-07 PROCEDURE — 99213 OFFICE O/P EST LOW 20 MIN: CPT | Performed by: PHYSICIAN ASSISTANT

## 2022-03-17 ENCOUNTER — APPOINTMENT (OUTPATIENT)
Dept: LAB | Facility: HOSPITAL | Age: 47
End: 2022-03-17
Payer: MEDICARE

## 2022-03-17 DIAGNOSIS — E88.89 MADELUNG'S NECK (HCC): ICD-10-CM

## 2022-03-17 DIAGNOSIS — F31.9 BIPOLAR AFFECTIVE DISORDER, REMISSION STATUS UNSPECIFIED (HCC): Primary | ICD-10-CM

## 2022-03-17 DIAGNOSIS — Z79.899 ENCOUNTER FOR LONG-TERM (CURRENT) USE OF OTHER MEDICATIONS: ICD-10-CM

## 2022-03-17 LAB
25(OH)D3 SERPL-MCNC: 32.2 NG/ML (ref 30–100)
ALBUMIN SERPL BCP-MCNC: 3.8 G/DL (ref 3.5–5)
ALP SERPL-CCNC: 51 U/L (ref 46–116)
ALT SERPL W P-5'-P-CCNC: 30 U/L (ref 12–78)
ANION GAP SERPL CALCULATED.3IONS-SCNC: 4 MMOL/L (ref 4–13)
AST SERPL W P-5'-P-CCNC: 23 U/L (ref 5–45)
BASOPHILS # BLD AUTO: 0.03 THOUSANDS/ΜL (ref 0–0.1)
BASOPHILS NFR BLD AUTO: 1 % (ref 0–1)
BILIRUB SERPL-MCNC: 0.71 MG/DL (ref 0.2–1)
BUN SERPL-MCNC: 27 MG/DL (ref 5–25)
CALCIUM SERPL-MCNC: 9.4 MG/DL (ref 8.3–10.1)
CHLORIDE SERPL-SCNC: 109 MMOL/L (ref 100–108)
CHOLEST SERPL-MCNC: 169 MG/DL
CO2 SERPL-SCNC: 30 MMOL/L (ref 21–32)
CREAT SERPL-MCNC: 1.19 MG/DL (ref 0.6–1.3)
EOSINOPHIL # BLD AUTO: 0.15 THOUSAND/ΜL (ref 0–0.61)
EOSINOPHIL NFR BLD AUTO: 3 % (ref 0–6)
ERYTHROCYTE [DISTWIDTH] IN BLOOD BY AUTOMATED COUNT: 12.3 % (ref 11.6–15.1)
EST. AVERAGE GLUCOSE BLD GHB EST-MCNC: 103 MG/DL
GFR SERPL CREATININE-BSD FRML MDRD: 72 ML/MIN/1.73SQ M
GLUCOSE P FAST SERPL-MCNC: 97 MG/DL (ref 65–99)
HBA1C MFR BLD: 5.2 %
HCT VFR BLD AUTO: 42.7 % (ref 36.5–49.3)
HDLC SERPL-MCNC: 44 MG/DL
HGB BLD-MCNC: 14.2 G/DL (ref 12–17)
IMM GRANULOCYTES # BLD AUTO: 0.01 THOUSAND/UL (ref 0–0.2)
IMM GRANULOCYTES NFR BLD AUTO: 0 % (ref 0–2)
LDLC SERPL CALC-MCNC: 111 MG/DL (ref 0–100)
LYMPHOCYTES # BLD AUTO: 1.63 THOUSANDS/ΜL (ref 0.6–4.47)
LYMPHOCYTES NFR BLD AUTO: 37 % (ref 14–44)
MCH RBC QN AUTO: 27.8 PG (ref 26.8–34.3)
MCHC RBC AUTO-ENTMCNC: 33.3 G/DL (ref 31.4–37.4)
MCV RBC AUTO: 84 FL (ref 82–98)
MONOCYTES # BLD AUTO: 0.63 THOUSAND/ΜL (ref 0.17–1.22)
MONOCYTES NFR BLD AUTO: 14 % (ref 4–12)
NEUTROPHILS # BLD AUTO: 1.97 THOUSANDS/ΜL (ref 1.85–7.62)
NEUTS SEG NFR BLD AUTO: 45 % (ref 43–75)
NONHDLC SERPL-MCNC: 125 MG/DL
NRBC BLD AUTO-RTO: 0 /100 WBCS
PLATELET # BLD AUTO: 222 THOUSANDS/UL (ref 149–390)
PMV BLD AUTO: 10 FL (ref 8.9–12.7)
POTASSIUM SERPL-SCNC: 4.4 MMOL/L (ref 3.5–5.3)
PROT SERPL-MCNC: 7.6 G/DL (ref 6.4–8.2)
RBC # BLD AUTO: 5.1 MILLION/UL (ref 3.88–5.62)
SODIUM SERPL-SCNC: 143 MMOL/L (ref 136–145)
TRIGL SERPL-MCNC: 71 MG/DL
TSH SERPL DL<=0.05 MIU/L-ACNC: 2.36 UIU/ML (ref 0.36–3.74)
WBC # BLD AUTO: 4.42 THOUSAND/UL (ref 4.31–10.16)

## 2022-03-17 PROCEDURE — 80053 COMPREHEN METABOLIC PANEL: CPT

## 2022-03-17 PROCEDURE — 82306 VITAMIN D 25 HYDROXY: CPT

## 2022-03-17 PROCEDURE — 36415 COLL VENOUS BLD VENIPUNCTURE: CPT

## 2022-03-17 PROCEDURE — 85025 COMPLETE CBC W/AUTO DIFF WBC: CPT

## 2022-03-17 PROCEDURE — 83036 HEMOGLOBIN GLYCOSYLATED A1C: CPT

## 2022-03-17 PROCEDURE — 84443 ASSAY THYROID STIM HORMONE: CPT

## 2022-03-17 PROCEDURE — 80061 LIPID PANEL: CPT

## 2022-05-16 ENCOUNTER — OFFICE VISIT (OUTPATIENT)
Dept: INTERNAL MEDICINE CLINIC | Facility: CLINIC | Age: 47
End: 2022-05-16

## 2022-05-16 VITALS
WEIGHT: 131.4 LBS | HEIGHT: 64 IN | HEART RATE: 65 BPM | OXYGEN SATURATION: 98 % | TEMPERATURE: 98.3 F | BODY MASS INDEX: 22.43 KG/M2 | DIASTOLIC BLOOD PRESSURE: 76 MMHG | SYSTOLIC BLOOD PRESSURE: 118 MMHG

## 2022-05-16 DIAGNOSIS — E78.00 PURE HYPERCHOLESTEROLEMIA: Chronic | ICD-10-CM

## 2022-05-16 DIAGNOSIS — G89.29 CHRONIC BILATERAL LOW BACK PAIN WITH LEFT-SIDED SCIATICA: Primary | ICD-10-CM

## 2022-05-16 DIAGNOSIS — M54.42 CHRONIC BILATERAL LOW BACK PAIN WITH LEFT-SIDED SCIATICA: Primary | ICD-10-CM

## 2022-05-16 PROCEDURE — 99203 OFFICE O/P NEW LOW 30 MIN: CPT | Performed by: HOSPITALIST

## 2022-05-16 RX ORDER — METHOCARBAMOL 500 MG/1
500 TABLET, FILM COATED ORAL 4 TIMES DAILY PRN
Qty: 30 TABLET | Refills: 0 | Status: CANCELLED | OUTPATIENT
Start: 2022-05-16

## 2022-05-16 RX ORDER — SENNOSIDES 8.6 MG
650 CAPSULE ORAL EVERY 8 HOURS PRN
Qty: 30 TABLET | Refills: 0 | Status: SHIPPED | OUTPATIENT
Start: 2022-05-16

## 2022-05-16 RX ORDER — IBUPROFEN 600 MG/1
600 TABLET ORAL EVERY 8 HOURS PRN
Qty: 30 TABLET | Refills: 0 | Status: SHIPPED | OUTPATIENT
Start: 2022-05-16

## 2022-05-16 NOTE — PROGRESS NOTES
[facility]  INTERNAL MEDICINE OFFICE VISIT     PATIENT INFORMATION     [facility]   51 y o  male   MRN: [id number]    ASSESSMENT/PLAN     Problem List Items Addressed This Visit        Nervous and Auditory    Chronic bilateral low back pain with left-sided sciatica - Primary (Chronic)  Presents with chronic neck, back, and bilateral shoulder pain associated with left sided sciatica and numbness of left pinky and ring fingers  Symptoms have not changed in severity or become more frequent  Previously took Cymbalta and Mobic and saw physical therapy  History of C5-6 fusion about 8 years ago by a Clemente Rodríguez surgeon [facility] [place]  MRI of cervical and lumbar spine from 2016 showed intact plate and screws at K9-5 with mild noncompressive left foraminal stenosis at C5-6 and disc bulging of L3-S1 without herniation or canal/formainal narrowing  · Discussed restarting physical therapy and/or Cymbalta but patient refused   · If symptoms become worse, consider repeat MRI or referral to neurosurgery or pain management  · Start tylenol and ibuprofen PRN  · Return in 6 weeks for f/u       Relevant Medications    acetaminophen (TYLENOL) 650 mg CR tablet    ibuprofen (MOTRIN) 600 mg tablet       Other    Pure hypercholesterolemia (Chronic)  Lipid panel completed in March 2022 significant for   No history of MI, stroke, HTN, PAD  a1c 5 2%  · Discussed limiting foods high in fat (I e  fried foods, creams), increasing fruit and vegetable intake, limit red meat  · Advised patient to walk 30 minutes a day          Schedule a follow-up appointment in 6 weeks for back pain and 6 months for AMW      HEALTH MAINTENANCE     Immunization History   Administered Date(s) Administered    Influenza Quadrivalent, 6-35 Months IM 12/22/2016, 10/19/2017    Influenza, injectable, quadrivalent, preservative free 0 5 mL 09/12/2018    Influenza, seasonal, injectable 11/26/2013     CHIEF COMPLAINT Chief Complaint   Patient presents with    Annual Exam    Medication Refill     Muscle pain      HISTORY OF PRESENT ILLNESS     50 y/o male with history of cervical fusion of C5-6 about 8 years ago at [place] [address] in [facility], bipolar disorder, hyperlipidemia, BPH s/p TURP in 2020 who presents as to establish care  He was previously a patient at this clinic 3 years ago  He endorses chronic back, neck, and bilateral shoulder pain that has occurred for years  Pain occurs every day  Also endorses chronic, occasional numbness in pinky and ring fingers of left hand and sciatica of his left leg  Patient states that the pain and numbness has not changed in frequency or intensity  Previously took Cymbalta and Mobic a couple years ago but discontinued both because he felt like he was taking too many pills  Seen by physical therapy years ago but feels that it didn't help his symptoms  Has occasional mild headaches  He is currently unemployed due to disability  Used to work in [employer]  Never smoked  Does not drink alcohol  No illicit drug use  Lives with his wife  Stretches every morning and tries to complete a short walk every day  Follows with a psychiatrist prescribes Lamictal and Seroquel for bipolar disorder  REVIEW OF SYSTEMS     Review of Systems   Constitutional: Negative for appetite change, chills, diaphoresis, fever and unexpected weight change  Eyes: Negative for visual disturbance  Respiratory: Negative for cough and shortness of breath  Cardiovascular: Negative for chest pain, palpitations and leg swelling  Gastrointestinal: Negative for abdominal pain, constipation, diarrhea, nausea and vomiting  Genitourinary: Negative for difficulty urinating, dysuria and hematuria  Musculoskeletal: Positive for arthralgias, back pain and neck pain  Skin: Negative for rash  Neurological: Positive for numbness and headaches  Negative for dizziness and weakness       OBJECTIVE Vitals:    05/16/22 1301   BP: 135/91   BP Location: Left arm   Patient Position: Sitting   Cuff Size: Standard   Pulse: 65   Temp: 98 3 °F (36 8 °C)   TempSrc: Temporal   SpO2: 98%   Weight: 59 6 kg (131 lb 6 4 oz)   Height: 5' 4" (1 626 m)     Physical Exam  Vitals reviewed  Constitutional:       General: He is not in acute distress  HENT:      Head: Normocephalic and atraumatic  Nose: No rhinorrhea  Eyes:      General: No scleral icterus  Cardiovascular:      Rate and Rhythm: Normal rate and regular rhythm  Pulses: Normal pulses  Heart sounds: Normal heart sounds  No murmur heard  No friction rub  No gallop  Pulmonary:      Breath sounds: Normal breath sounds  No wheezing, rhonchi or rales  Abdominal:      General: Bowel sounds are normal  There is no distension  Palpations: Abdomen is soft  Tenderness: There is no abdominal tenderness  There is no guarding  Musculoskeletal:      Cervical back: No bony tenderness  Thoracic back: No bony tenderness  Lumbar back: No bony tenderness  Right lower leg: No edema  Left lower leg: No edema  Comments: No paraspinal tenderness cervical, thoracic, or lumbar muscles    Skin:     General: Skin is warm and dry  Coloration: Skin is not jaundiced  Neurological:      Mental Status: He is alert        Comments: CN 2-12 grossly intact, upper and lower extremity strength 5/5,  strength symmetric, patellar and biceps reflexes 2/4 bilaterally, sensation to light touch grossly intact, negative Phalen's test, negative Tinel test at left wrist, negative straight leg raise bilaterally   Psychiatric:         Behavior: Behavior normal        CURRENT MEDICATIONS     Current Outpatient Medications:     lamoTRIgine (LaMICtal) 25 mg tablet, , Disp: , Rfl:     QUEtiapine (SEROquel) 400 MG tablet, , Disp: , Rfl:     docusate sodium (COLACE) 100 mg capsule, Take 1 capsule (100 mg total) by mouth 3 (three) times a day for 7 days (Patient not taking: Reported on 5/16/2022), Disp: 21 capsule, Rfl: 0    DULoxetine (CYMBALTA) 60 mg delayed release capsule, , Disp: , Rfl:     lamoTRIgine (LaMICtal) 100 mg tablet, Take 100 mg by mouth 2 (two) times a day (Patient not taking: Reported on 5/16/2022), Disp: , Rfl:     meloxicam (MOBIC) 7 5 mg tablet, Take 7 5 mg by mouth 2 (two) times a day (Patient not taking: Reported on 5/16/2022), Disp: , Rfl: 1    PAST MEDICAL & SURGICAL HISTORY     Past Medical History:   Diagnosis Date    Anxiety     Bipolar disorder ([facility] [address] )     Chronic back pain     with left sided sciatica    Constipation     Fibromyalgia, primary     Hemorrhoids     last assessed - 09Aug2012    Hyperlipidemia      Past Surgical History:   Procedure Laterality Date    CERVICAL FUSION      Cervical Vertbral fusion    DE EXCISE VARICOCELE Left 8/27/2019    Procedure: VARICOCELECTOMY;  Surgeon: Lisa Henao MD;  [facility];  Service: Urology    DE TRANSURETHRAL ELEC-SURG PROSTATECTOM N/A 11/17/2020    Procedure: TRANSURETHRAL VAPORIZATION OF PROSTATE (TURP);   Surgeon: Lisa Henao MD;  [facility];  Service: Urology     SOCIAL & FAMILY HISTORY     Social History     Socioeconomic History    Marital status: Single     Spouse name: Not on file    Number of children: Not on file    Years of education: Not on file    Highest education level: Not on file   Occupational History    Occupation: Disabled   Tobacco Use    Smoking status: Never Smoker    Smokeless tobacco: Never Used   Vaping Use    Vaping Use: Never used   Substance and Sexual Activity    Alcohol use: No    Drug use: No    Sexual activity: Yes   Other Topics Concern    Not on file   Social History Narrative    Not on file     Social Determinants of Health     Financial Resource Strain: Low Risk     Difficulty of Paying Living Expenses: Not hard at all   Food Insecurity: No Food Insecurity    Worried About Running Out of Food in the Last Year: Never true    [address] in the Last Year: Never true   Transportation Needs: No Transportation Needs    Lack of Transportation (Medical): No    Lack of Transportation (Non-Medical): No   Physical Activity: Not on file   Stress: Not on file   Social Connections: Not on file   Intimate Partner Violence: Not on file   Housing Stability: Low Risk     Unable to Pay for Housing in the Last Year: No    Number of Places Lived in the Last Year: 1    Unstable Housing in the Last Year: No     Social History     Substance and Sexual Activity   Alcohol Use No     Social History     Substance and Sexual Activity   Drug Use No     Social History     Tobacco Use   Smoking Status Never Smoker   Smokeless Tobacco Never Used     Family History   Problem Relation Age of Onset    Arthritis Mother     Hyperlipidemia Mother     Hyperthyroidism Father     Other Father         Prostate disease    Colon cancer Sister 40    Other Sister         Epilepsy    Asthma Brother          ==  Lalita Presley,   Internal Medicine Resident, PGY-1  [address] [facility] [address] [address]  [address]   [facility] [address] [facility] [address] [facility]  Office: [phone]  Fax: [phone]

## 2022-10-11 ENCOUNTER — OFFICE VISIT (OUTPATIENT)
Dept: INTERNAL MEDICINE CLINIC | Facility: CLINIC | Age: 47
End: 2022-10-11

## 2022-10-11 ENCOUNTER — TELEPHONE (OUTPATIENT)
Dept: INTERNAL MEDICINE CLINIC | Facility: CLINIC | Age: 47
End: 2022-10-11

## 2022-10-11 VITALS
SYSTOLIC BLOOD PRESSURE: 153 MMHG | HEART RATE: 60 BPM | WEIGHT: 131 LBS | DIASTOLIC BLOOD PRESSURE: 92 MMHG | BODY MASS INDEX: 21.83 KG/M2 | TEMPERATURE: 98.5 F | HEIGHT: 65 IN

## 2022-10-11 DIAGNOSIS — M54.42 CHRONIC BILATERAL LOW BACK PAIN WITH LEFT-SIDED SCIATICA: ICD-10-CM

## 2022-10-11 DIAGNOSIS — M54.2 CHRONIC NECK PAIN: Primary | ICD-10-CM

## 2022-10-11 DIAGNOSIS — G89.29 CHRONIC BILATERAL LOW BACK PAIN WITH LEFT-SIDED SCIATICA: ICD-10-CM

## 2022-10-11 DIAGNOSIS — G89.29 CHRONIC NECK PAIN: Primary | ICD-10-CM

## 2022-10-11 PROCEDURE — 99213 OFFICE O/P EST LOW 20 MIN: CPT | Performed by: INTERNAL MEDICINE

## 2022-10-11 RX ORDER — METHOCARBAMOL 500 MG/1
500 TABLET, FILM COATED ORAL
Qty: 30 TABLET | Refills: 1 | Status: SHIPPED | OUTPATIENT
Start: 2022-10-11 | End: 2022-10-13

## 2022-10-11 RX ORDER — CELECOXIB 100 MG/1
100 CAPSULE ORAL 2 TIMES DAILY PRN
Qty: 60 CAPSULE | Refills: 1 | Status: SHIPPED | OUTPATIENT
Start: 2022-10-11 | End: 2022-11-10

## 2022-10-11 NOTE — PROGRESS NOTES
[facility]  INTERNAL MEDICINE OFFICE VISIT     PATIENT INFORMATION     hJon Lewis   49 y o  male   MRN: [id number]    ASSESSMENT/PLAN     Diagnoses and all orders for this visit:    Presented with chronic neck, back, and bilateral shoulder pain  History of C5-6 fusion and fibromyalgia  Last seen in office in May 2022  Patient declined PT and restarting Cymbalta at that time  Was prescribed ibuprofen and Tylenol  He has only taken ibuprofen, heat, and a massage device with minimal relief  Stretches every day  On exam, mild paraspinal tenderness at L3-5 and mild lateral shoulder tenderness  ROM of neck mildly decreased  · Start Celebrex and Robaxin as below  · Advised adding moist heat for pain relief  · Discontinue ibuprofen  · Referral to pain management     Chronic neck pain  -     Ambulatory referral to Pain Management; Future  -     celecoxib (CeleBREX) 100 mg capsule; Take 1 capsule (100 mg total) by mouth 2 (two) times a day as needed for mild pain  -     methocarbamol (ROBAXIN) 500 mg tablet; Take 1 tablet (500 mg total) by mouth daily at bedtime as needed for muscle spasms    Chronic bilateral low back pain with left-sided sciatica  -     Ambulatory referral to Pain Management; Future  -     celecoxib (CeleBREX) 100 mg capsule; Take 1 capsule (100 mg total) by mouth 2 (two) times a day as needed for mild pain  -     methocarbamol (ROBAXIN) 500 mg tablet; Take 1 tablet (500 mg total) by mouth daily at bedtime as needed for muscle spasms      Schedule a follow-up appointment in 6 weeks for [facility]      HEALTH MAINTENANCE     Immunization History   Administered Date(s) Administered   • Influenza Quadrivalent, 6-35 Months IM 12/22/2016, 10/19/2017   • Influenza, injectable, quadrivalent, preservative free 0 5 mL 09/12/2018   • Influenza, seasonal, injectable 11/26/2013     CHIEF COMPLAINT     Chief Complaint   Patient presents with   • Follow-up     Follow up back pain      HISTORY OF PRESENT ILLNESS     Tara Michael is a 49 y o  male with a history of bipolar disorder, fibromyalgia, BPH s/p TURP in 2020, C5-6 fusion here for follow up of back pain  Seen 5 months ago  Patient declined PT and Cymbalta at that time  Given ibuprofen and Tylenol PRN  No relief with ibuprofen  Reports bilateral shoulder pain, back pain, neck pain  States shoulders feel swollen  Intermittent bilateral numbness of ring and pinky fingers  Minimal relief with heat and massage device  Would like a referral to pain management  States that pain is constant and some days becomes worse  Does stretches everyday  No falls, trauma  REVIEW OF SYSTEMS     Review of Systems   Constitutional: Negative for appetite change, chills, diaphoresis, fever and unexpected weight change  HENT: Negative for congestion, ear pain, rhinorrhea and sore throat  Eyes: Negative for visual disturbance  Respiratory: Negative for cough and shortness of breath  Cardiovascular: Negative for chest pain, palpitations and leg swelling  Gastrointestinal: Negative for abdominal pain, constipation, diarrhea, nausea and vomiting  Genitourinary: Negative for difficulty urinating, dysuria and hematuria  Musculoskeletal: Positive for back pain and neck pain  Skin: Negative for rash  Neurological: Positive for numbness (Bilateral 4th and 5th digits)  Negative for dizziness, weakness, light-headedness and headaches  OBJECTIVE     Vitals:    10/11/22 1258   BP: 153/92   BP Location: Left arm   Patient Position: Sitting   Cuff Size: Standard   Pulse: 60   Temp: 98 5 °F (36 9 °C)   TempSrc: Temporal   Weight: 59 4 kg (131 lb)   Height: 5' 4 75" (1 645 m)     Physical Exam  Vitals reviewed  Constitutional:       General: He is not in acute distress  HENT:      Head: Normocephalic and atraumatic  Nose: No rhinorrhea  Eyes:      General: No scleral icterus    Neck:      Comments: Neck extension and flexion mildly decreased  Cardiovascular:      Rate and Rhythm: Normal rate and regular rhythm  Pulses: Normal pulses  Heart sounds: Normal heart sounds  No murmur heard  No friction rub  No gallop  Pulmonary:      Effort: Pulmonary effort is normal  No respiratory distress  Breath sounds: Normal breath sounds  No wheezing, rhonchi or rales  Abdominal:      Comments: Appears nondistended   Musculoskeletal:      Right shoulder: Tenderness (lateral) present  No swelling or bony tenderness  Left shoulder: Tenderness (lateral) present  No swelling or bony tenderness  Cervical back: No bony tenderness  Thoracic back: No bony tenderness  Lumbar back: Tenderness (mild, paraspinal at levl of L3-5) present  No bony tenderness  Skin:     General: Skin is warm and dry  Capillary Refill: Capillary refill takes less than 2 seconds  Coloration: Skin is not jaundiced  Neurological:      Mental Status: He is alert        Comments: CN 2-12 grossly intact, moves all 4 extremities, no dysarthria   Psychiatric:         Mood and Affect: Mood normal          Behavior: Behavior normal        CURRENT MEDICATIONS     Current Outpatient Medications:   •  ibuprofen (MOTRIN) 600 mg tablet, Take 1 tablet (600 mg total) by mouth every 8 (eight) hours as needed for mild pain, Disp: 30 tablet, Rfl: 0  •  lamoTRIgine (LaMICtal) 25 mg tablet, Take 50 mg by mouth in the morning , Disp: , Rfl:   •  QUEtiapine (SEROquel) 400 MG tablet, Take 300 mg by mouth daily at bedtime , Disp: , Rfl:   •  acetaminophen (TYLENOL) 650 mg CR tablet, Take 1 tablet (650 mg total) by mouth every 8 (eight) hours as needed for mild pain (Patient not taking: Reported on 10/11/2022), Disp: 30 tablet, Rfl: 0    PAST MEDICAL & SURGICAL HISTORY     Past Medical History:   Diagnosis Date   • Anxiety    • Bipolar disorder (HCC)    • Chronic back pain     with left sided sciatica   • Constipation    • Fibromyalgia, primary • Hemorrhoids     last assessed - [id number]   • Hyperlipidemia      Past Surgical History:   Procedure Laterality Date   • CERVICAL FUSION      Cervical Vertbral fusion   • IL EXCISE VARICOCELE Left 8/27/2019    Procedure: VARICOCELECTOMY;  Surgeon: Ilana Engle MD;  Location[facility];  Service: Urology   • IL TRANSURETHRAL ELEC-SURG PROSTATECTOM N/A 11/17/2020    Procedure: TRANSURETHRAL VAPORIZATION OF PROSTATE (TURP); Surgeon: Ilana Engle MD;  Location[facility];  Service: Urology     SOCIAL & FAMILY HISTORY     Social History     Socioeconomic History   • Marital status: /Civil Union     Spouse name: Not on file   • Number of children: Not on file   • Years of education: Not on file   • Highest education level: Not on file   Occupational History   • Occupation: Disabled   Tobacco Use   • Smoking status: Never Smoker   • Smokeless tobacco: Never Used   Vaping Use   • Vaping Use: Never used   Substance and Sexual Activity   • Alcohol use: No   • Drug use: No   • Sexual activity: Yes   Other Topics Concern   • Not on file   Social History Narrative   • Not on file     Social Determinants of Health     Financial Resource Strain: Low Risk    • Difficulty of Paying Living Expenses: Not hard at all   Food Insecurity: No Food Insecurity   • Worried About Running Out of Food in the Last Year: Never true   • Ran Out of Food in the Last Year: Never true   Transportation Needs: No Transportation Needs   • Lack of Transportation (Medical): No   • Lack of Transportation (Non-Medical):  No   Physical Activity: Not on file   Stress: Not on file   Social Connections: Not on file   Intimate Partner Violence: Not on file   Housing Stability: Low Risk    • Unable to Pay for Housing in the Last Year: No   • Number of Places Lived in the Last Year: 1   • Unstable Housing in the Last Year: No     Social History     Substance and Sexual Activity   Alcohol Use No     Social History     Substance and Sexual Activity   Drug Use No     Social History     Tobacco Use   Smoking Status Never Smoker   Smokeless Tobacco Never Used     Family History   Problem Relation Age of Onset   • Arthritis Mother    • Hyperlipidemia Mother    • Hyperthyroidism Father    • Other Father         Prostate disease   • Colon cancer Sister 40   • Other Sister         Epilepsy   • Asthma Brother          ==  Ron Andersen DO  Internal Medicine Resident, PGY-2  [address] [facility] [address]  [address]   [facility] SPECIALTY [facility] , [id number] [place] [address] [address], [address] [facility]  Office: [phone]  Fax: [phone]

## 2022-10-11 NOTE — TELEPHONE ENCOUNTER
Patient called to let us know that he could not  the Celebrex or the Robaxin called into the pharmacy today  The pharmacy said it would be $229  He does not have that to spend out of pocket  Please advise what else patient can take

## 2022-10-13 DIAGNOSIS — M54.42 CHRONIC BILATERAL LOW BACK PAIN WITH LEFT-SIDED SCIATICA: Primary | ICD-10-CM

## 2022-10-13 DIAGNOSIS — G89.29 CHRONIC BILATERAL LOW BACK PAIN WITH LEFT-SIDED SCIATICA: Primary | ICD-10-CM

## 2022-10-13 RX ORDER — MELOXICAM 7.5 MG/1
7.5 TABLET ORAL DAILY PRN
Qty: 30 TABLET | Refills: 0 | Status: SHIPPED | OUTPATIENT
Start: 2022-10-13 | End: 2023-03-27 | Stop reason: ALTCHOICE

## 2022-10-13 RX ORDER — CYCLOBENZAPRINE HCL 5 MG
5 TABLET ORAL 3 TIMES DAILY PRN
Qty: 90 TABLET | Refills: 0 | Status: SHIPPED | OUTPATIENT
Start: 2022-10-13 | End: 2022-11-18

## 2022-10-13 NOTE — TELEPHONE ENCOUNTER
Please let patient know that Mobic (an anti-inflammatory) and Flexeril (a muscle relaxer) were called in to his pharmacy  The anti-inflammatory can be taken once a day as needed and the muscle relaxer can be up to 3 times a day as needed  He should start taking the muscle relaxer at night because it can cause sedation  If it doesn't make him too sleepy then he can take it during the day  Thank you!

## 2022-10-20 NOTE — TELEPHONE ENCOUNTER
Called and spoke to patient, made patient aware as per note  Patient stated new medications copayment of $218 is still high and can not afford to pay that out of pocket  Advised patient he can use [facility] or call his insurance to verify with them to see if he can get a cheaper alternative  Patient stated he does not want to keep going back and further and would rather buy over the counter medications or buy off the street   Call disconnected

## 2022-11-18 ENCOUNTER — CONSULT (OUTPATIENT)
Dept: PAIN MEDICINE | Facility: CLINIC | Age: 47
End: 2022-11-18

## 2022-11-18 ENCOUNTER — TELEPHONE (OUTPATIENT)
Dept: PAIN MEDICINE | Facility: CLINIC | Age: 47
End: 2022-11-18

## 2022-11-18 VITALS
BODY MASS INDEX: 23.39 KG/M2 | HEIGHT: 63 IN | SYSTOLIC BLOOD PRESSURE: 155 MMHG | DIASTOLIC BLOOD PRESSURE: 61 MMHG | WEIGHT: 132 LBS

## 2022-11-18 DIAGNOSIS — G89.29 CHRONIC BILATERAL LOW BACK PAIN WITH LEFT-SIDED SCIATICA: ICD-10-CM

## 2022-11-18 DIAGNOSIS — G89.29 CHRONIC NECK PAIN: ICD-10-CM

## 2022-11-18 DIAGNOSIS — M79.18 MYOFASCIAL PAIN: ICD-10-CM

## 2022-11-18 DIAGNOSIS — M54.42 CHRONIC BILATERAL LOW BACK PAIN WITH LEFT-SIDED SCIATICA: ICD-10-CM

## 2022-11-18 DIAGNOSIS — M54.12 CERVICAL RADICULOPATHY: Primary | ICD-10-CM

## 2022-11-18 DIAGNOSIS — M54.2 CHRONIC NECK PAIN: ICD-10-CM

## 2022-11-18 DIAGNOSIS — M54.16 LUMBAR RADICULOPATHY: ICD-10-CM

## 2022-11-18 RX ORDER — TIZANIDINE 2 MG/1
2 TABLET ORAL EVERY 8 HOURS PRN
Qty: 90 TABLET | Refills: 1 | Status: SHIPPED | OUTPATIENT
Start: 2022-11-18

## 2022-11-18 NOTE — PROGRESS NOTES
Assessment  1  Cervical radiculopathy    2  Chronic neck pain    3  Chronic bilateral low back pain with left-sided sciatica    4  Lumbar radiculopathy    5  Myofascial pain        Plan  16-year-old male with a history of C5-6 fusion, fibromyalgia, and bipolar disorder, referred by Dr Ольга Walters, presenting for initial consultation regarding a long-standing history of neck pain that radiates into bilateral upper extremities with associated numbness and paresthesias to the hands and no specific dermatomal fashion  Also complains of lumbosacral back pain that radiates into the anterolateral aspect of the left lower extremity to the foot with associated numbness and paresthesias  He denies any specific trauma or inciting event  The patient did find some mild relief after cervical fusion  MRI of the cervical spine from 2016 shows stable ACDF at C5-6 with minimal degenerative discs and facet disease at C6-7  MRI of the lumbar spine from 2016 shows noncompressive disc bulges from L3-4 to L5-S1  He does not have any recent imaging of his cervical or lumbar spine  The patient has not found any relief with physical therapy in the past   He has not done any physical therapy within the past year  The patient has tried various NSAIDs, most recently meloxicam which is ineffective  Tylenol does not provide much relief  The patient did try gabapentin in the past and believes he may have had side effects from the medication  This was discontinued  Cyclobenzaprine does not provide any relief  He was offered Cymbalta by his PCP which he declined  Patient's neck and low back pain do have myofascial and possibly facet mediated components  Upper and lower extremity symptoms may be radicular in nature verses peripheral neuropathy versus components of fibromyalgia  1  I will order a flexion-extension x-ray of the cervical spine to evaluate for dynamic instability  2   I will order an MRI of the cervical and lumbar spine without contrast  3  I will order physical therapy as I feel the patient may benefit from Winsome based exercises for his cervical and lumbar complaints  4  I will prescribe a trial of tizanidine 2 mg q 8 hours p r n  for his myofascial pain  5  I will follow up the patient in 6 weeks and call with results of images once received       My impressions and treatment recommendations were discussed in detail with the patient who verbalized understanding and had no further questions  Discharge instructions were provided  I personally saw and examined the patient and I agree with the above discussed plan of care  No orders of the defined types were placed in this encounter  No orders of the defined types were placed in this encounter  History of Present Illness    Kyle Maki is a 49 y o  male with a history of C5-6 fusion, fibromyalgia, and bipolar disorder, referred by Dr Jace Houston, presenting for initial consultation regarding a long-standing history of neck pain that radiates into bilateral upper extremities with associated numbness and paresthesias to the hands and no specific dermatomal fashion  Also complains of lumbosacral back pain that radiates into the anterolateral aspect of the left lower extremity to the foot with associated numbness and paresthesias  He denies any bladder or bowel incontinence, saddle anesthesia, or balance issues  He denies any specific trauma or inciting event  He denies any interval trauma since his cervical fusion  The patient did find some mild relief after cervical fusion  MRI of the cervical spine from 2016 shows stable ACDF at C5-6 with minimal degenerative discs and facet disease at C6-7  MRI of the lumbar spine from 2016 shows noncompressive disc bulges from L3-4 to L5-S1  He does not have any recent imaging of his cervical or lumbar spine    The patient has not found any relief with physical therapy in the past   He has not done any physical therapy within the past year  The patient has tried various NSAIDs, most recently meloxicam which is ineffective  Tylenol does not provide much relief  The patient did try gabapentin in the past and believes he may have had side effects from the medication  This was discontinued  Cyclobenzaprine does not provide any relief  He was offered Cymbalta by his PCP which he declined  The patient rates his pain 10 on the pain is constant  The pain is not follow any particular pattern throughout the day  The pain is described as burning, cramping, shooting, numbness, sharp, pins and needles, pressure-like, throbbing, dull, and aching  The pain is increased with lying down, standing, bending, sitting, walking, and exercise  He does find some relief with heat and ice application  Other than as stated above, the patient denies any interval changes in medications, medical condition, mental condition, symptoms, or allergies since the last office visit  I have personally reviewed and/or updated the patient's past medical history, past surgical history, family history, social history, current medications, allergies, and vital signs today  Review of Systems   Constitutional: Negative for fever and unexpected weight change  HENT: Negative for trouble swallowing  Eyes: Positive for visual disturbance  Respiratory: Negative for shortness of breath and wheezing  Cardiovascular: Positive for palpitations  Negative for chest pain  Gastrointestinal: Negative for constipation, diarrhea, nausea and vomiting  Endocrine: Positive for polydipsia and polyuria  Negative for cold intolerance and heat intolerance  Genitourinary: Negative for difficulty urinating and frequency  Musculoskeletal: Positive for arthralgias, joint swelling and myalgias  Negative for gait problem  Skin: Negative for rash  Neurological: Positive for dizziness, numbness and headaches  Negative for seizures, syncope and weakness  Hematological: Does not bruise/bleed easily  Psychiatric/Behavioral: Positive for decreased concentration and suicidal ideas  Negative for dysphoric mood  Anxiety, depression   All other systems reviewed and are negative  Patient Active Problem List   Diagnosis   • Bipolar disease, chronic (HCC)   • Chronic bilateral low back pain with left-sided sciatica   • Depression with anxiety   • Fibromyalgia   • Hypogonadism male   • Chronic neck pain   • Pure hypercholesterolemia   • Other hemorrhoids   • Chronic idiopathic constipation   • Benign localized prostatic hyperplasia with lower urinary tract symptoms (LUTS)       Past Medical History:   Diagnosis Date   • Anxiety    • Bipolar disorder ([facility] [address] )    • Chronic back pain     with left sided sciatica   • Constipation    • Fibromyalgia, primary    • Hemorrhoids     last assessed - 09Aug2012   • Hyperlipidemia        Past Surgical History:   Procedure Laterality Date   • CERVICAL FUSION      Cervical Vertbral fusion   • VA EXCISE VARICOCELE Left 8/27/2019    Procedure: VARICOCELECTOMY;  Surgeon: Jacqueline Cheung MD;  Location: [facility];  Service: Urology   • VA TRANSURETHRAL ELEC-SURG PROSTATECTOM N/A 11/17/2020    Procedure: TRANSURETHRAL VAPORIZATION OF PROSTATE (TURP);   Surgeon: Jacqueline Cheung MD;  Location: [facility];  Service: Urology       Family History   Problem Relation Age of Onset   • Arthritis Mother    • Hyperlipidemia Mother    • Hyperthyroidism Father    • Other Father         Prostate disease   • Colon cancer Sister 40   • Other Sister         Epilepsy   • Asthma Brother        Social History     Occupational History   • Occupation: Disabled   Tobacco Use   • Smoking status: Never   • Smokeless tobacco: Never   Vaping Use   • Vaping Use: Never used   Substance and Sexual Activity   • Alcohol use: No   • Drug use: No   • Sexual activity: Yes       Current Outpatient Medications on File Prior to Visit   Medication Sig   • cyclobenzaprine (FLEXERIL) 5 mg tablet Take 1 tablet (5 mg total) by mouth 3 (three) times a day as needed for muscle spasms   • meloxicam (Mobic) 7 5 mg tablet Take 1 tablet (7 5 mg total) by mouth daily as needed for moderate pain   • acetaminophen (TYLENOL) 650 mg CR tablet Take 1 tablet (650 mg total) by mouth every 8 (eight) hours as needed for mild pain (Patient not taking: Reported on 10/11/2022)   • celecoxib (CeleBREX) 100 mg capsule Take 1 capsule (100 mg total) by mouth 2 (two) times a day as needed for mild pain   • lamoTRIgine (LaMICtal) 25 mg tablet Take 50 mg by mouth in the morning    • QUEtiapine (SEROquel) 400 MG tablet Take 300 mg by mouth daily at bedtime      No current facility-administered medications on file prior to visit  No Known Allergies    Physical Exam    There were no vitals taken for this visit  Constitutional: normal, well developed, well nourished, alert, in no distress and non-toxic and no overt pain behavior  Eyes: anicteric  HEENT: grossly intact  Neck: supple, symmetric, trachea midline and no masses   Pulmonary:even and unlabored  Cardiovascular:No edema or pitting edema present  Skin:Normal without rashes or lesions and well hydrated  Psychiatric:Mood and affect appropriate  Neurologic:Cranial Nerves II-XII grossly intact  Musculoskeletal:normal gait  Bilateral cervical paraspinals and trapezii tender to palpation  Bilateral biceps, triceps, and brachioradialis reflexes were 2/4 and symmetrical   Negative Steel's reflex bilaterally  Bilateral upper extremity strength 5/5 in all muscle groups  Sensation intact to light touch in C5 through T1 dermatomes bilaterally  Negative Spurling's bilaterally  Bilateral lumbar paraspinals tender to palpation  Bilateral SI joints nontender to palpation  Bilateral trochanteric flares nontender to palpation  Bilateral patellar and Achilles reflexes were 2/4 and symmetrical   No clonus was noted bilaterally    Bilateral lower extremity strength was 5/5 in all muscle groups  Sensation intact to light touch in L3 thru S1 dermatomes bilaterally  Negative straight leg raise bilaterally  Negative Saul's and Gaenslen's test bilaterally  Imaging    Study Result    Narrative & Impression   MRI CERVICAL SPINE WITHOUT CONTRAST     INDICATION:  69-year-old male, postlaminectomy syndrome, radiculopathy  COMPARISON:  11/11/2011 x-rays     TECHNIQUE:  Sagittal T1, sagittal T2, sagittal inversion recovery, axial T2, axial  2D merge          IMAGE QUALITY:  Diagnostic     FINDINGS:  Intact appearing plate and screw ACDF with interbody spacer at C5-6, unchanged     ALIGNMENT:  Normal alignment of the cervical spine  No compression fracture  No subluxation  No scoliosis      MARROW SIGNAL:  Normal marrow signal is identified within the visualized bony structures  No discrete marrow lesion      CERVICAL AND VISUALIZED THORACIC CORD:  Normal signal within the visualized cord      PREVERTEBRAL AND PARASPINAL SOFT TISSUES:  Prevertebral and paraspinal soft tissues are unremarkable      VISUALIZED POSTERIOR FOSSA:  The visualized posterior fossa demonstrates no abnormal signal      CERVICAL DISC SPACES:          C2-C3:  Normal      C3-C4:  Normal      C4-C5:  Normal      C5-C6:  Intact appearing ACDF, mild left foraminal stenosis, patent central canal and right foramen, no overt neural element impingement      C6-C7:  Minimal degenerative disc and facet disease, no stenosis     C7-T1:  Normal      UPPER THORACIC DISC SPACES:  Normal      IMPRESSION:  Intact appearing ACDF C5-6, similar to prior x-rays     Mild noncompressive left foraminal stenosis C5-6        Workstation performed: [id number]     PACS Images     Show images for MRI lumbar spine wo contrast  Study Result    Narrative & Impression   MRI LUMBAR SPINE WITHOUT CONTRAST     INDICATION:  Lumbar radiculopathy    Bilateral lower extremity numbness     COMPARISON: 3/28/2012     TECHNIQUE:  Sagittal T1, sagittal T2, sagittal inversion recovery, axial T1 and axial T2, coronal T2        IMAGE QUALITY:  Diagnostic     FINDINGS:     ALIGNMENT:  Normal alignment of the lumbar spine  No compression fracture  No spondylolysis or spondylolisthesis  No scoliosis      MARROW SIGNAL:  Normal marrow signal is identified within the visualized bony structures  No discrete marrow lesion      DISTAL CORD AND CONUS:  Normal size and signal within the distal cord and conus  The conus ends at the L1 level      PARASPINAL SOFT TISSUES:  Paraspinal soft tissues are unremarkable      SACRUM:  Normal signal within the sacrum  No evidence of insufficiency or stress fracture      LOWER THORACIC DISC SPACES:  Normal disc height and signal   No disc herniation, canal stenosis or foraminal narrowing      LUMBAR DISC SPACES:          L1-L2:  Normal      L2-L3:  Normal      L3-L4:  There is a diffuse disk bulge  No significant central canal or neural foraminal narrowing       L4-L5:  There is a diffuse disk bulge  No significant central canal or neural foraminal narrowing       L5-S1:  There is a diffuse disk bulge    No significant central canal or neural foraminal narrowing       IMPRESSION:     There is no focal disk herniation, central canal or neural foraminal narrowing          Workstation performed: [id number]

## 2022-11-18 NOTE — TELEPHONE ENCOUNTER
Caller: Brian Pierre    Doctor: Thuan Cardoso    Reason for call: Patient called asking for the generic brand of tizanidine to be called into the pharmacy for him- patient stated that the medication is too expensive    pls use [facility] on file       Call back#: [phone]

## 2022-12-07 ENCOUNTER — EVALUATION (OUTPATIENT)
Dept: PHYSICAL THERAPY | Facility: REHABILITATION | Age: 47
End: 2022-12-07

## 2022-12-07 VITALS — DIASTOLIC BLOOD PRESSURE: 97 MMHG | HEART RATE: 66 BPM | SYSTOLIC BLOOD PRESSURE: 135 MMHG

## 2022-12-07 DIAGNOSIS — M54.12 CERVICAL RADICULOPATHY: ICD-10-CM

## 2022-12-07 DIAGNOSIS — M54.42 CHRONIC BILATERAL LOW BACK PAIN WITH LEFT-SIDED SCIATICA: ICD-10-CM

## 2022-12-07 DIAGNOSIS — M54.16 LUMBAR RADICULOPATHY: ICD-10-CM

## 2022-12-07 DIAGNOSIS — M54.2 CHRONIC NECK PAIN: ICD-10-CM

## 2022-12-07 DIAGNOSIS — G89.29 CHRONIC BILATERAL LOW BACK PAIN WITH LEFT-SIDED SCIATICA: ICD-10-CM

## 2022-12-07 DIAGNOSIS — G89.29 CHRONIC NECK PAIN: ICD-10-CM

## 2022-12-07 NOTE — LETTER
2022    [facility], [address] [place] [address]    Patient: Sandeep Quiñonez   YOB: 1975   Date of Visit: 2022     Encounter Diagnosis     ICD-10-CM    1  Chronic neck pain  M54 2 Ambulatory referral to Physical Therapy    G89 29       2  Chronic bilateral low back pain with left-sided sciatica  M54 42 Ambulatory referral to Physical Therapy    G89 29       3  Cervical radiculopathy  M54 12 Ambulatory referral to Physical Therapy      4  Lumbar radiculopathy  M54 16 Ambulatory referral to Physical Therapy          Dear Dr Tariq Kid:    Thank you for your recent referral of Sandeep Quiñonez  Please review the attached evaluation summary from Bill's recent visit  Please verify that you agree with the plan of care by signing the attached order  If you have any questions or concerns, please do not hesitate to call  I sincerely appreciate the opportunity to share in the care of one of your patients and hope to have another opportunity to work with you in the near future  Sincerely,    Kavon Barillas, PT      Referring Provider:      I certify that I have read the below Plan of Care and certify the need for these services furnished under this plan of treatment while under my care  Jace Narayan DO  [address] [address]  [address] [place]          PT Evaluation     Today's date: 2022  Patient name: Sandeep Quiñonez  : 1975  MRN: [id number]  Referring provider: Kenneth Santizo DO Dx:   Encounter Diagnosis     ICD-10-CM    1  Chronic neck pain  M54 2 Ambulatory referral to Physical Therapy    G89 29       2  Chronic bilateral low back pain with left-sided sciatica  M54 42 Ambulatory referral to Physical Therapy    G89 29       3  Cervical radiculopathy  M54 12 Ambulatory referral to Physical Therapy      4   Lumbar radiculopathy  M54 16 Ambulatory referral to Physical Therapy Start Time: 0930  Stop Time: 1030  Total time in clinic (min): 60 minutes    Assessment  Assessment details: Richard Ruiz is a 49 y o  male presenting to outpatient physical therapy on 12/7/22  with referral from Dr Tania Strong for chronic neck and back pain with cervical radiculopathy and lumbar radiculopathy   While he reports symptom onset in his upper extremities with median nerve distribution on his right hand and ulnar nerve distribution of his left hand, symptoms were not present during session and were not reproduced by neurodynamic testing  He has impaired sensation in his c5 dermatome bilaterally, but this is likely a result of his previous medical and surgical history  He has significant range of motion limitations throughout his cervical spine with movement increasing the intensity of his symptoms  Dizziness and headaches appear to be cervicogenic in nature, as they are related to movement  Bill experienced slight reduction in neck pain with upper cervical retractions and extensions  Eston Heading demonstrates limitations and pain with lumbar flexion  While he does not report symptom increase with lumbar extensions, he also denies improvement  Eston Heading demonstrates global activity intolerance, decreased range of motion throughout his cervical and lumbar spine as well as nerve tension in his LLE  The listed impairments are effecting Kemar'so ability to function at their prior level  They would benefit from physical therapy services at this times in order to address the impairments and functional limitation in order to allow for a return to premorbid status     HEP provided with: supine 90/90 nerve glides, seated chin tucks with cervical extension  Impairments: abnormal or restricted ROM, activity intolerance, lacks appropriate home exercise program and pain with function    Symptom irritability: highUnderstanding of Dx/Px/POC: good   Prognosis: fair    Goals  Short Term Goals:   1   Patient will demonstrate independence with HEP by providing return demonstration of exercises  2  Patient will report decreased symptom intensity during activity by 50%  3  Patient will increase cervical rotation by 10 degrees to improve ability to drive  4  Patient will increase shoulder flexion by 15 degrees to improve ability to reach overhead  5  Darci Foster will report moderate restriction with household chores  Long Term Goals:   1  Patient will improve FOTO to greater then goal  2  Patient will improve pain with activity to 2/10 or less  3  Patient will continue with HEP to allow for continued progress and function  4  Patient will demonstrate ability to lift 15-20 lbs without increasing symptoms beyond 4>10      Plan  Plan details: Initiate physical therapy at a frequency of 1-2x per week for 8-10 weeks to increase activity tolerance, strength, endurance and range of motion and reduce pain  Patient would benefit from: skilled physical therapy  Referral necessary: No  Planned modality interventions: biofeedback, cryotherapy and thermotherapy: hydrocollator packs  Planned therapy interventions: joint mobilization, manual therapy, muscle pump exercises, neuromuscular re-education, patient education, strengthening, stretching, therapeutic activities, therapeutic exercise, home exercise program, graded exercise, graded activity, gait training, functional ROM exercises, flexibility, body mechanics training, balance, therapeutic training, massage, abdominal trunk stabilization and balance/weight bearing training  Frequency: 1x week  Treatment plan discussed with: patient      Subjective Evaluation    History of Present Illness  Mechanism of injury: Darci Foster reports chronic neck and back pain pain, with bilateral UE and LLE radicular symptoms  He has a history of a C5-6 fusion, and fibromyalgia  He describes symptoms following median nerve distribution in his right hand and ulnar nerve in his left hand   His back pain radiates into the lateral aspect of his thigh and occasionally down to his toes  He endorses infrequent tingling in his groin, but denies saddle anaesthesia and bowel/bladder changes    With his symptoms, he says the numbness and pain just comes on  He usually will feel it with repetitive movements like pushing, lifitng or pulling  He reports he feels a swelling in his shoulders with these activities as well  He says his neck pain and UE symptoms get worse at night when he lays down, and they sometimes wake him up at night but he can control his symptoms by moving his arms around and doing stretches  He says his hands and arms sometimes feel weak  Turning his head makes him feel dizzy, and increases his neck pain                Recurrent probem    Quality of life: fair    Pain  Current pain ratin  At best pain ratin  At worst pain ratin  Relieving factors: heat and medications  Aggravating factors: stair climbing, walking, standing, sitting, overhead activity and lifting  Progression: worsening    Treatments  Previous treatment: medication and physical therapy  Current treatment: massage and medication  Patient Goals  Patient goals for therapy: decreased pain, increased motion, return to sport/leisure activities and increased strength        Objective     Concurrent Complaints  Negative for bladder dysfunction, bowel dysfunction and saddle (S4) numbness    Neurological Testing     Sensation   Cervical/Thoracic   Left   Diminished: light touch    Right   Diminished: light touch    Lumbar   Left   Intact: light touch    Right   Intact: light touch    Reflexes   Left   Biceps (C5/C6): normal (2+)  Brachioradialis (C6): normal (2+)  Patellar (L4): normal (2+)  Achilles (S1): normal (2+)  Babinski sign: negative    Right   Biceps (C5/C6): normal (2+)  Brachioradialis (C6): normal (2+)  Patellar (L4): normal (2+)  Achilles (S1): normal (2+)  Babinski sign: negative    Additional Neurological Details  Patient has diminished sensation in his c5 dermatome at his deltoid bilaterally  Active Range of Motion   Left Shoulder   Flexion: 150 degrees with pain  Abduction: 160 degrees with pain  External rotation BTH: C7   Internal rotation BTB: T12     Right Shoulder   Flexion: 150 degrees with pain  Abduction: 160 degrees with pain  External rotation BTH: C7   Internal rotation BTB: T12     Additional Active Range of Motion Details  Cervical spine AROM  Flexion: 70% limited  Extension: 50% limited  Rotation L: 30% limited, R: 50% limited  Side bendin% limited bilaterally  Mechanical Assessment    Cervical    Seated retraction: repeated movements   Pain intensity: worse  Pain level: increased  Seated Extension: repeated movements  Pain intensity: better  Pain level: decreased    Thoracic      Lumbar      Strength/Myotome Testing     Left Shoulder     Planes of Motion   Flexion: 4   Abduction: 4   External rotation at 0°: 4   Internal rotation at 0°: 4+     Right Shoulder     Planes of Motion   Flexion: 4+   Abduction: 4+   External rotation at 0°: 4+   Internal rotation at 0°: 4+     Left Hip   Planes of Motion   Flexion: 4    Right Hip   Planes of Motion   Flexion: 4    Left Knee   Flexion: 4+  Extension: 5    Right Knee   Flexion: 4  Extension: 5    General Comments:      Lumbar Comments  Patient reports localized back pain increases with flexion, and denies symptom change with standing and prone extensions  22 FOTO 44       Cervical/Thoracic Comments  22 FOTO- 39       Precautions: Standard, fibromyalgia      Manuals                                                                 Neuro Re-Ed 22            Supine 90/90- nerve glide x15            Cervical retractions and extensions x8                                                                             Ther Ex                                                                                                                     Ther Activity Gait Training                                       Modalities

## 2022-12-07 NOTE — PROGRESS NOTES
PT Evaluation     Today's date: 2022  Patient name: Jhon Lewis  : 1975  MRN: [id number]  Referring provider: Jigar Pollock DO Dx:   Encounter Diagnosis     ICD-10-CM    1  Chronic neck pain  M54 2 Ambulatory referral to Physical Therapy    G89 29       2  Chronic bilateral low back pain with left-sided sciatica  M54 42 Ambulatory referral to Physical Therapy    G89 29       3  Cervical radiculopathy  M54 12 Ambulatory referral to Physical Therapy      4  Lumbar radiculopathy  M54 16 Ambulatory referral to Physical Therapy        Start Time: 930  Stop Time:   Total time in clinic (min): 60 minutes    Assessment  Assessment details: Jhon Lewis is a 49 y o  male presenting to outpatient physical therapy on 22  with referral from Dr Ele Schmitz for chronic neck and back pain with cervical radiculopathy and lumbar radiculopathy   While he reports symptom onset in his upper extremities with median nerve distribution on his right hand and ulnar nerve distribution of his left hand, symptoms were not present during session and were not reproduced by neurodynamic testing  He has impaired sensation in his c5 dermatome bilaterally, but this is likely a result of his previous medical and surgical history  He has significant range of motion limitations throughout his cervical spine with movement increasing the intensity of his symptoms  Dizziness and headaches appear to be cervicogenic in nature, as they are related to movement  Bill experienced slight reduction in neck pain with upper cervical retractions and extensions  Layne Rossi demonstrates limitations and pain with lumbar flexion  While he does not report symptom increase with lumbar extensions, he also denies improvement  Layne Rossi demonstrates global activity intolerance, decreased range of motion throughout his cervical and lumbar spine as well as nerve tension in his LLE   The listed impairments are effecting Kemar'so ability to function at their prior level  They would benefit from physical therapy services at this times in order to address the impairments and functional limitation in order to allow for a return to premorbid status     HEP provided with: supine 90/90 nerve glides, seated chin tucks with cervical extension  Impairments: abnormal or restricted ROM, activity intolerance, lacks appropriate home exercise program and pain with function    Symptom irritability: highUnderstanding of Dx/Px/POC: good   Prognosis: fair    Goals  Short Term Goals:   1  Patient will demonstrate independence with HEP by providing return demonstration of exercises  2  Patient will report decreased symptom intensity during activity by 50%  3  Patient will increase cervical rotation by 10 degrees to improve ability to drive  4  Patient will increase shoulder flexion by 15 degrees to improve ability to reach overhead  5  Layne Flo will report moderate restriction with household chores  Long Term Goals:   1  Patient will improve FOTO to greater then goal  2  Patient will improve pain with activity to 2/10 or less  3  Patient will continue with HEP to allow for continued progress and function  4  Patient will demonstrate ability to lift 15-20 lbs without increasing symptoms beyond 4>10      Plan  Plan details: Initiate physical therapy at a frequency of 1-2x per week for 8-10 weeks to increase activity tolerance, strength, endurance and range of motion and reduce pain      Patient would benefit from: skilled physical therapy  Referral necessary: No  Planned modality interventions: biofeedback, cryotherapy and thermotherapy: hydrocollator packs  Planned therapy interventions: joint mobilization, manual therapy, muscle pump exercises, neuromuscular re-education, patient education, strengthening, stretching, therapeutic activities, therapeutic exercise, home exercise program, graded exercise, graded activity, gait training, functional ROM exercises, flexibility, body mechanics training, balance, therapeutic training, massage, abdominal trunk stabilization and balance/weight bearing training  Frequency: 1x week  Treatment plan discussed with: patient      Subjective Evaluation    History of Present Illness  Mechanism of injury: Dallas Weinstein reports chronic neck and back pain pain, with bilateral UE and LLE radicular symptoms  He has a history of a C5-6 fusion, and fibromyalgia  He describes symptoms following median nerve distribution in his right hand and ulnar nerve in his left hand  His back pain radiates into the lateral aspect of his thigh and occasionally down to his toes  He endorses infrequent tingling in his groin, but denies saddle anaesthesia and bowel/bladder changes    With his symptoms, he says the numbness and pain just comes on  He usually will feel it with repetitive movements like pushing, lifitng or pulling  He reports he feels a swelling in his shoulders with these activities as well  He says his neck pain and UE symptoms get worse at night when he lays down, and they sometimes wake him up at night but he can control his symptoms by moving his arms around and doing stretches  He says his hands and arms sometimes feel weak  Turning his head makes him feel dizzy, and increases his neck pain                Recurrent probem    Quality of life: fair    Pain  Current pain ratin  At best pain ratin  At worst pain ratin  Relieving factors: heat and medications  Aggravating factors: stair climbing, walking, standing, sitting, overhead activity and lifting  Progression: worsening    Treatments  Previous treatment: medication and physical therapy  Current treatment: massage and medication  Patient Goals  Patient goals for therapy: decreased pain, increased motion, return to sport/leisure activities and increased strength        Objective     Concurrent Complaints  Negative for bladder dysfunction, bowel dysfunction and saddle (S4) numbness    Neurological Testing     Sensation   Cervical/Thoracic   Left   Diminished: light touch    Right   Diminished: light touch    Lumbar   Left   Intact: light touch    Right   Intact: light touch    Reflexes   Left   Biceps (C5/C6): normal (2+)  Brachioradialis (C6): normal (2+)  Patellar (L4): normal (2+)  Achilles (S1): normal (2+)  Babinski sign: negative    Right   Biceps (C5/C6): normal (2+)  Brachioradialis (C6): normal (2+)  Patellar (L4): normal (2+)  Achilles (S1): normal (2+)  Babinski sign: negative    Additional Neurological Details  Patient has diminished sensation in his c5 dermatome at his deltoid bilaterally  Active Range of Motion   Left Shoulder   Flexion: 150 degrees with pain  Abduction: 160 degrees with pain  External rotation BTH: C7   Internal rotation BTB: T12     Right Shoulder   Flexion: 150 degrees with pain  Abduction: 160 degrees with pain  External rotation BTH: C7   Internal rotation BTB: T12     Additional Active Range of Motion Details  Cervical spine AROM  Flexion: 70% limited  Extension: 50% limited  Rotation L: 30% limited, R: 50% limited  Side bendin% limited bilaterally    Mechanical Assessment    Cervical    Seated retraction: repeated movements   Pain intensity: worse  Pain level: increased  Seated Extension: repeated movements  Pain intensity: better  Pain level: decreased    Thoracic      Lumbar      Strength/Myotome Testing     Left Shoulder     Planes of Motion   Flexion: 4   Abduction: 4   External rotation at 0°: 4   Internal rotation at 0°: 4+     Right Shoulder     Planes of Motion   Flexion: 4+   Abduction: 4+   External rotation at 0°: 4+   Internal rotation at 0°: 4+     Left Hip   Planes of Motion   Flexion: 4    Right Hip   Planes of Motion   Flexion: 4    Left Knee   Flexion: 4+  Extension: 5    Right Knee   Flexion: 4  Extension: 5    General Comments:      Lumbar Comments  Patient reports localized back pain increases with flexion, and denies symptom change with standing and prone extensions  12/7/22 FOTO 44       Cervical/Thoracic Comments  12/7/22 FOTO- 39        Precautions: Standard, fibromyalgia      Manuals                                                                 Neuro Re-Ed 12/7/22            Supine 90/90- nerve glide x15            Cervical retractions and extensions x8                                                                             Ther Ex                                                                                                                     Ther Activity                                       Gait Training                                       Modalities

## 2022-12-14 ENCOUNTER — OFFICE VISIT (OUTPATIENT)
Dept: PHYSICAL THERAPY | Facility: REHABILITATION | Age: 47
End: 2022-12-14

## 2022-12-14 DIAGNOSIS — M54.42 CHRONIC BILATERAL LOW BACK PAIN WITH LEFT-SIDED SCIATICA: ICD-10-CM

## 2022-12-14 DIAGNOSIS — G89.29 CHRONIC NECK PAIN: Primary | ICD-10-CM

## 2022-12-14 DIAGNOSIS — M54.16 LUMBAR RADICULOPATHY: ICD-10-CM

## 2022-12-14 DIAGNOSIS — M54.2 CHRONIC NECK PAIN: Primary | ICD-10-CM

## 2022-12-14 DIAGNOSIS — M54.12 CERVICAL RADICULOPATHY: ICD-10-CM

## 2022-12-14 DIAGNOSIS — G89.29 CHRONIC BILATERAL LOW BACK PAIN WITH LEFT-SIDED SCIATICA: ICD-10-CM

## 2022-12-14 NOTE — PROGRESS NOTES
Daily Note     Today's date: 2022  Patient name: Lroie Gibson  : 1975  MRN: [id number]  Referring provider: Tonia Chang DO  Dx:   Encounter Diagnosis     ICD-10-CM    1  Chronic neck pain  M54 2     G89 29       2  Chronic bilateral low back pain with left-sided sciatica  M54 42     G89 29       3  Cervical radiculopathy  M54 12       4  Lumbar radiculopathy  M54 16                      Subjective: Lizette Reynaga says there isn't too much different since the last session  The exercises haven't been helping or hurting one way or the other  He says that his foot hasn't been tingling as much, but that it still comes and goes  Objective: See treatment diary below      Assessment: Lizette Reynaga had a fair response to interventions performed today  He noted fatigue and burning during open book exercises in his shoulder musculature, but denied symptom onset in his hands and neck  He reported onset of left sided low back pain and hamstring pain after 4 minutes on the recumbent bicycle  Incoprorated supine hamstring stretch to address patient's reported sensations  Encouraged patient to try gentle AROM of lumbar spine and perform stretching when symptoms onset to help self manage symptoms  Plan: Continue per plan of care  Precautions: Standard, fibromyalgia      Manuals                                                                 Neuro Re-Ed 22           Supine 90/90- nerve glide x15 x15           Cervical retractions and extensions x8 x10                                                                            Ther Ex             Rows  10#  3x10           Suitcase carry  15#  3 x 2 laps           Open books  1x12           Ball roll outs  3x10 ea             Recumbant bike  7' level 3           Supine hamstring stretch  90/90  30"x3           Supine bridges  1x10                        Ther Activity                                       Gait Training Modalities

## 2022-12-19 ENCOUNTER — HOSPITAL ENCOUNTER (OUTPATIENT)
Dept: RADIOLOGY | Facility: HOSPITAL | Age: 47
Discharge: HOME | End: 2022-12-19
Attending: ANESTHESIOLOGY

## 2022-12-19 DIAGNOSIS — M54.12 CERVICAL RADICULOPATHY: ICD-10-CM

## 2022-12-19 DIAGNOSIS — M54.16 LUMBAR RADICULOPATHY: ICD-10-CM

## 2022-12-21 ENCOUNTER — OFFICE VISIT (OUTPATIENT)
Dept: PHYSICAL THERAPY | Facility: REHABILITATION | Age: 47
End: 2022-12-21

## 2022-12-21 DIAGNOSIS — M54.2 CHRONIC NECK PAIN: Primary | ICD-10-CM

## 2022-12-21 DIAGNOSIS — M54.42 CHRONIC BILATERAL LOW BACK PAIN WITH LEFT-SIDED SCIATICA: ICD-10-CM

## 2022-12-21 DIAGNOSIS — M54.16 LUMBAR RADICULOPATHY: ICD-10-CM

## 2022-12-21 DIAGNOSIS — G89.29 CHRONIC BILATERAL LOW BACK PAIN WITH LEFT-SIDED SCIATICA: ICD-10-CM

## 2022-12-21 DIAGNOSIS — M54.12 CERVICAL RADICULOPATHY: ICD-10-CM

## 2022-12-21 DIAGNOSIS — G89.29 CHRONIC NECK PAIN: Primary | ICD-10-CM

## 2022-12-21 NOTE — PROGRESS NOTES
Daily Note     Today's date: 2022  Patient name: Vane Humphrey  : 1975  MRN: [id number]  Referring provider: Marianela Guevara DO  Dx:   Encounter Diagnosis     ICD-10-CM    1  Chronic neck pain  M54 2     G89 29       2  Chronic bilateral low back pain with left-sided sciatica  M54 42     G89 29       3  Cervical radiculopathy  M54 12       4  Lumbar radiculopathy  M54 16           Start Time: 1400  Stop Time: 1445  Total time in clinic (min): 45 minutes    Subjective: Anselmo Alvarez says he hasn't noticed a lot of changes yet  He has been having less tingling in his hands, but is still getting the tingling in his leg  Objective: See treatment diary below      Assessment: Anselmo Alvarez responded appropriately to interventions performed today  Reviewed HEP with patient and encouraged patient to attempt prone extension exercises when he notices symptom increase and self assess for symptom improvement  Continued to encourage patient to perform gentle activity at home to help modify symptoms  Plan: Continue per plan of care  Precautions: Standard, fibromyalgia      Manuals             First rib mobilization   inferior glide   LM          Upper trap stretch   LM                                    Neuro Re-Ed 22          Supine 90/90- nerve glide x15 x15           Cervical retractions and extensions x8 x10 x10          Prone extensions   x12          Standing extensions   x12                                                 Ther Ex             Rows  10#  3x10 15 # 3x10    10#  3x10 straight arm          Suitcase carry  15#  3 x 2 laps           Open books  1x12 2x12          Ball roll outs  3x10 ea             Recumbant bike  7' level 3           Supine hamstring stretch  90/90  30"x3           Supine bridges  1x10           Upper trap stretch   2x 30"                       UBE   6' 3 for, 3 rev          Ther Activity                                       Gait Training Modalities

## 2022-12-23 ENCOUNTER — TELEPHONE (OUTPATIENT)
Dept: PAIN MEDICINE | Facility: CLINIC | Age: 47
End: 2022-12-23

## 2022-12-23 NOTE — TELEPHONE ENCOUNTER
Please notify the patient the MRI of cervical spine shows stable anterior fusion at C5-6  Tiny central disc protrusion at C6-7 without any significant central or foraminal stenosis (narrowing where spinal cord and nerves exist)  MRI of the lumbar spine shows some arthritis at L4-5  There is no central or foraminal stenosis noted    We will discuss further treatment at next office visit

## 2022-12-27 NOTE — TELEPHONE ENCOUNTER
Caller: Marjorie Virgen     Doctor: Dr Austen Ricks for call: Patient returning call from RN    Call back#: [phone]

## 2022-12-27 NOTE — TELEPHONE ENCOUNTER
Caller: Pt    Doctor: Brody Del Cid     Reason for call: Caller transferred to nurse       Call back#:

## 2022-12-27 NOTE — TELEPHONE ENCOUNTER
RN attempted to contact pt  [facility]  Provided with CB# and OH      Pt has OVS 12/30/22

## 2022-12-29 ENCOUNTER — OFFICE VISIT (OUTPATIENT)
Dept: PHYSICAL THERAPY | Facility: REHABILITATION | Age: 47
End: 2022-12-29

## 2022-12-29 DIAGNOSIS — M54.16 LUMBAR RADICULOPATHY: ICD-10-CM

## 2022-12-29 DIAGNOSIS — G89.29 CHRONIC NECK PAIN: Primary | ICD-10-CM

## 2022-12-29 DIAGNOSIS — G89.29 CHRONIC BILATERAL LOW BACK PAIN WITH LEFT-SIDED SCIATICA: ICD-10-CM

## 2022-12-29 DIAGNOSIS — M54.2 CHRONIC NECK PAIN: Primary | ICD-10-CM

## 2022-12-29 DIAGNOSIS — M54.42 CHRONIC BILATERAL LOW BACK PAIN WITH LEFT-SIDED SCIATICA: ICD-10-CM

## 2022-12-29 DIAGNOSIS — M54.12 CERVICAL RADICULOPATHY: ICD-10-CM

## 2022-12-29 NOTE — PROGRESS NOTES
Assessment:  1  Chronic pain of both shoulders    2  Myofascial pain    3  Left leg pain    4  Arm numbness        Plan:  1  Patient will be scheduled for bilateral subacromial bursa injections under ultrasound guidance to address the inflammatory component of the patient's pain  Complete risks and benefits including bleeding, infection, tissue reaction, nerve injury and allergic reaction were discussed  The patient was agreeable and verbalized an understanding  2  I will order x-rays of the bilateral shoulders  3  I will order EMG of the left lower extremity and EMG of the bilateral upper extremities  4  Continue tizanidine as prescribed  This medication was refilled today  He does not wish to increase dosing at this time  5  Continue with home exercise program  6  Follow-up after procedure or sooner if needed    History of Present Illness: The patient is a 49 y o  male with a history of C5-6 fusion, fibromyalgia and bipolar disorder last seen on 11/18/22 who presents for a follow up office visit in regards to chroni neck c pain that radiates into the shoulders and paresthesias into the bilateral upper extremities and no specific dermatomal distribution andlumbosacral back pain with paresthesias into the left lower extremity  He denies bowel or bladder incontinence, balance issues or saddle anesthesia  MRI of the lumbar spine reveals some mild arthritis at L4-5 without any central or foraminal stenosis, otherwise normal MRI of the cervical spine reveals a stable ACDF at C5-6 and a small disc protrusion at C6-7 without any central or foraminal stenosis, otherwise unremarkable  Has completed physical therapy without relief  He has tried NSAIDs in the past without relief  He complains today of localized shoulder pain in the anterior shoulder bilaterally  The patient rates his pain a 7 out of 10 on the numeric pain rating scale    He currently has pain in the morning and at night which is described as burning, dull aching, sharp, shooting, numbness and pins-and-needles    I have personally reviewed and/or updated the patient's past medical history, past surgical history, family history, social history, current medications, allergies, and vital signs today  Review of Systems:    Review of Systems   Respiratory: Negative for shortness of breath  Cardiovascular: Negative for chest pain  Gastrointestinal: Negative for constipation, diarrhea, nausea and vomiting  Musculoskeletal: Positive for gait problem  Negative for arthralgias, joint swelling and myalgias  Skin: Negative for rash  Neurological: Negative for dizziness, seizures and weakness  All other systems reviewed and are negative  Past Medical History:   Diagnosis Date   • Anxiety    • Bipolar disorder ([facility] [address] )    • Chronic back pain     with left sided sciatica   • Constipation    • Fibromyalgia, primary    • Hemorrhoids     last assessed - 09Aug2012   • Hyperlipidemia        Past Surgical History:   Procedure Laterality Date   • CERVICAL FUSION      Cervical Vertbral fusion   • ME EXC VARICOCELE/LIGATION SPERMATIC VEINS SPX Left 8/27/2019    Procedure: VARICOCELECTOMY;  Surgeon: Kailey Presley MD;  Location: [facility];  Service: Urology   • ME TRUR ELECTROS[address] N/A 11/17/2020    Procedure: TRANSURETHRAL VAPORIZATION OF PROSTATE (TURP); Surgeon: Kailey Presley MD;  Location: [facility];  Service: Urology       Family History   Problem Relation Age of Onset   • Arthritis Mother    • Hyperlipidemia Mother    • Hyperthyroidism Father    • Other Father         Prostate disease   • Colon cancer Sister 40   • Other Sister         Epilepsy   • Asthma Brother        Social History     Occupational History   • Occupation: Disabled   Tobacco Use   • Smoking status: Never   • Smokeless tobacco: Never   Vaping Use   • Vaping Use: Never used   Substance and Sexual Activity   • Alcohol use: No   • Drug use:  No • Sexual activity: Yes         Current Outpatient Medications:   •  lamoTRIgine (LaMICtal) 25 mg tablet, Take 50 mg by mouth in the morning , Disp: , Rfl:   •  QUEtiapine (SEROquel) 400 MG tablet, Take 300 mg by mouth daily at bedtime , Disp: , Rfl:   •  tiZANidine (ZANAFLEX) 2 mg tablet, Take 1 tablet (2 mg total) by mouth every 8 (eight) hours as needed for muscle spasms, Disp: 90 tablet, Rfl: 1  •  acetaminophen (TYLENOL) 650 mg CR tablet, Take 1 tablet (650 mg total) by mouth every 8 (eight) hours as needed for mild pain (Patient not taking: Reported on 10/11/2022), Disp: 30 tablet, Rfl: 0  •  celecoxib (CeleBREX) 100 mg capsule, Take 1 capsule (100 mg total) by mouth 2 (two) times a day as needed for mild pain, Disp: 60 capsule, Rfl: 1  •  meloxicam (Mobic) 7 5 mg tablet, Take 1 tablet (7 5 mg total) by mouth daily as needed for moderate pain, Disp: 30 tablet, Rfl: 0    No Known Allergies    Physical Exam:    /77   Pulse 56   Ht 5' 3" (1 6 m)   Wt 60 3 kg (133 lb)   BMI 23 56 kg/m²     Constitutional:normal, well developed, well nourished, alert, in no distress and non-toxic and no overt pain behavior  Eyes:anicteric  HEENT:grossly intact  Neck:supple, symmetric, trachea midline and no masses   Pulmonary:even and unlabored  Cardiovascular:No edema or pitting edema present  Skin:Normal without rashes or lesions and well hydrated  Psychiatric:Mood and affect appropriate  Neurologic:Cranial Nerves II-XII grossly intact  Musculoskeletal:normal gait  Full range of motion all planes of the cervical spine  Negative Spurling's bilaterally  Anterior aspect of bilateral shoulders tender to palpation  Positive empty can and Neer test bilaterally         Imaging  XR shoulder 2+ vw left    (Results Pending)   XR shoulder 2+ vw right    (Results Pending)         Orders Placed This Encounter   Procedures   • XR shoulder 2+ vw left   • XR shoulder 2+ vw right   • EMG 1 Limb   • EMG 2 Limb Upper Extremity

## 2022-12-29 NOTE — PROGRESS NOTES
Daily Note     Today's date: 2022  Patient name: Andreea Sylvester  : 1975  MRN: [id number]  Referring provider: Ross Hernandez DO  Dx:   Encounter Diagnosis     ICD-10-CM    1  Chronic neck pain  M54 2     G89 29       2  Chronic bilateral low back pain with left-sided sciatica  M54 42     G89 29       3  Cervical radiculopathy  M54 12       4  Lumbar radiculopathy  M54 16                      Subjective: Anika Lopez reports no changes since the last visit  He tried the exercises when he got his symptoms, but didn't notice any changes  He doesn't really feel any better, but not any worse  Patient reports he has not had the chance to try his HEP since the last visit  Objective: See treatment diary below    Positive slump on the left      Assessment: Anika Lopez had a fair response to interventions performed today  Patient displayed fear avoidant behaviors during kettle bell carry, and requested to utilize lighter weight, noting he is worried about aggravating his symptoms  Patient is tender to palpation at l3-l4, noting symptom onset with palpation  Patient had a positive response to CPA, and reported increased shoulder symptoms with rotational mobilizations  Encouraged patient to incorporate seated nerve slides and prone laying to modulate symptoms  Anika Lopez would benefit from continued skilled physical therapy  Plan: Continue per plan of care        Precautions: Standard, fibromyalgia      Manuals            First rib mobilizatio   inferior glide   LM          Upper trap stretch   LM          CPA    GR II  LM         L/S rotational    Gr III  LM  (p)         Neuro Re-Ed 22          Supine 90/90- nerve glide x15 x15           Cervical retractions and extensions x8 x10 x10          Prone extensions   x12 x10    1' on table         Standing extensions   x12          Seated nerve glide    x15                                   Ther Ex             Rows  10#  3x10 15 # 3x10    10#  3x10 straight arm          Suitcase carry  15#  3 x 2 laps  20# 1x2 laps    15# 3x2 laps         Open books  1x12 2x12          Ball roll outs  3x10 ea             Recumbant bike  7' level 3           Supine hamstring stretch  90/90  30"x3           Supine bridges  1x10           Upper trap stretch   2x 30"                       Pallof press    2x 15 10#         UBE   6' 3 for, 3 rev 8' 4 for/ 4 rev         Ther Activity                                       Gait Training                                       Modalities

## 2022-12-30 ENCOUNTER — HOSPITAL ENCOUNTER (OUTPATIENT)
Dept: RADIOLOGY | Facility: HOSPITAL | Age: 47
Discharge: HOME | End: 2022-12-30

## 2022-12-30 ENCOUNTER — OFFICE VISIT (OUTPATIENT)
Dept: PAIN MEDICINE | Facility: CLINIC | Age: 47
End: 2022-12-30

## 2022-12-30 VITALS
HEART RATE: 56 BPM | SYSTOLIC BLOOD PRESSURE: 145 MMHG | WEIGHT: 133 LBS | DIASTOLIC BLOOD PRESSURE: 77 MMHG | BODY MASS INDEX: 23.57 KG/M2 | HEIGHT: 63 IN

## 2022-12-30 DIAGNOSIS — R20.0 ARM NUMBNESS: ICD-10-CM

## 2022-12-30 DIAGNOSIS — G89.29 CHRONIC PAIN OF BOTH SHOULDERS: Primary | ICD-10-CM

## 2022-12-30 DIAGNOSIS — M25.512 CHRONIC PAIN OF BOTH SHOULDERS: Primary | ICD-10-CM

## 2022-12-30 DIAGNOSIS — M79.18 MYOFASCIAL PAIN: ICD-10-CM

## 2022-12-30 DIAGNOSIS — M25.512 CHRONIC PAIN OF BOTH SHOULDERS: ICD-10-CM

## 2022-12-30 DIAGNOSIS — M25.511 CHRONIC PAIN OF BOTH SHOULDERS: ICD-10-CM

## 2022-12-30 DIAGNOSIS — G89.29 CHRONIC PAIN OF BOTH SHOULDERS: ICD-10-CM

## 2022-12-30 DIAGNOSIS — M79.605 LEFT LEG PAIN: ICD-10-CM

## 2022-12-30 DIAGNOSIS — M25.511 CHRONIC PAIN OF BOTH SHOULDERS: Primary | ICD-10-CM

## 2022-12-30 RX ORDER — TIZANIDINE 2 MG/1
2 TABLET ORAL EVERY 8 HOURS PRN
Qty: 90 TABLET | Refills: 1 | Status: SHIPPED | OUTPATIENT
Start: 2022-12-30

## 2023-01-03 ENCOUNTER — TELEPHONE (OUTPATIENT)
Dept: PAIN MEDICINE | Facility: CLINIC | Age: 48
End: 2023-01-03

## 2023-01-03 NOTE — TELEPHONE ENCOUNTER
----- Message from Sharad Weems, [address] sent at 1/3/2023  8:55 AM EST -----  Xray of left shoulder shows mild OA of AC joint

## 2023-01-04 ENCOUNTER — TELEPHONE (OUTPATIENT)
Dept: PAIN MEDICINE | Facility: CLINIC | Age: 48
End: 2023-01-04

## 2023-01-04 NOTE — TELEPHONE ENCOUNTER
Attempted to call the patient and left a detailed MOM in regards to the previous bilateral shoulder xray results   The right being normal

## 2023-01-04 NOTE — TELEPHONE ENCOUNTER
----- Message from Tammy Garcia, [address] sent at 1/3/2023  8:55 AM EST -----  Xray of left shoulder shows mild OA of AC joint

## 2023-02-02 ENCOUNTER — PROCEDURE VISIT (OUTPATIENT)
Dept: PAIN MEDICINE | Facility: CLINIC | Age: 48
End: 2023-02-02

## 2023-02-02 VITALS
WEIGHT: 133 LBS | HEIGHT: 63 IN | DIASTOLIC BLOOD PRESSURE: 86 MMHG | BODY MASS INDEX: 23.57 KG/M2 | SYSTOLIC BLOOD PRESSURE: 134 MMHG

## 2023-02-02 DIAGNOSIS — M25.512 CHRONIC PAIN OF BOTH SHOULDERS: Primary | ICD-10-CM

## 2023-02-02 DIAGNOSIS — G89.29 CHRONIC PAIN OF BOTH SHOULDERS: Primary | ICD-10-CM

## 2023-02-02 DIAGNOSIS — M25.511 CHRONIC PAIN OF BOTH SHOULDERS: Primary | ICD-10-CM

## 2023-02-02 RX ORDER — TRIAMCINOLONE ACETONIDE 40 MG/ML
40 INJECTION, SUSPENSION INTRA-ARTICULAR; INTRAMUSCULAR ONCE
Status: COMPLETED | OUTPATIENT
Start: 2023-02-02 | End: 2023-02-02

## 2023-02-02 RX ORDER — BUPIVACAINE HYDROCHLORIDE 2.5 MG/ML
10 INJECTION, SOLUTION EPIDURAL; INFILTRATION; INTRACAUDAL ONCE
Status: COMPLETED | OUTPATIENT
Start: 2023-02-02 | End: 2023-02-02

## 2023-02-02 RX ORDER — TRIAMCINOLONE ACETONIDE 40 MG/ML
40 INJECTION, SUSPENSION INTRA-ARTICULAR; INTRAMUSCULAR ONCE
Status: CANCELLED | OUTPATIENT
Start: 2023-02-02 | End: 2023-02-02

## 2023-02-02 RX ADMIN — BUPIVACAINE HYDROCHLORIDE 10 ML: 2.5 INJECTION, SOLUTION EPIDURAL; INFILTRATION; INTRACAUDAL at 10:00

## 2023-02-02 RX ADMIN — TRIAMCINOLONE ACETONIDE 40 MG: 40 INJECTION, SUSPENSION INTRA-ARTICULAR; INTRAMUSCULAR at 10:01

## 2023-02-02 RX ADMIN — TRIAMCINOLONE ACETONIDE 40 MG: 40 INJECTION, SUSPENSION INTRA-ARTICULAR; INTRAMUSCULAR at 10:00

## 2023-02-02 NOTE — PROGRESS NOTES
Large joint arthrocentesis: bilateral subacromial bursa  Universal Protocol:  Consent: Verbal consent obtained  Written consent obtained  Risks and benefits: risks, benefits and alternatives were discussed  Consent given by: patient  Time out: Immediately prior to procedure a "time out" was called to verify the correct patient, procedure, equipment, support staff and site/side marked as required  Timeout called at: 2/2/2023 9:41 AM   Patient understanding: patient states understanding of the procedure being performed  Patient consent: the patient's understanding of the procedure matches consent given  Procedure consent: procedure consent matches procedure scheduled  Site marked: the operative site was marked  Required items: required blood products, implants, devices, and special equipment available  Patient identity confirmed: verbally with patient    Supporting Documentation  Indications: pain   Procedure Details  Location: shoulder - bilateral subacromial bursa  Preparation: Patient was prepped and draped in the usual sterile fashion  Needle size: 25 G  Ultrasound guidance: yes          Ultrasound-guided bilateral subacromial subdeltoid bursa injection  Indication: Bilateral shoulder pain  Preoperative diagnosis: Bilateral shoulder pain  Postoperative diagnosis: Lateral shoulder pain  Procedure: Ultrasound-guided bilateral subacromial subdeltoid bursa injection  After discussing the risks, benefits, and alternatives to the procedure, the patient expressed understanding and wished to proceed  The patient was brought to the procedure suite and placed in the side lying position  A procedural pause was conducted to verify: correct patient identity, procedure to be performed and as applicable, correct side and site, correct patient position, and availability of implants, special equipment, or special requirements  A simple surgical tray was used   Prior to the procedure, the left shoulder was examined with a 12MHz linear transducer to visualize the subacromial-subdeltoid bursa and determine the optimal needle path  Following this, the left shoulder was prepared with a Chloraprep scrub, then re-examined using the same transducer, a sterile utrasound transducer cover, and sterile ultrasound transducer gel  Thereafter, using ultrasound guidance, a 2 5 inch 25 guage needle was advanced into the left subacromial-subdeltoid bursa  After visualization of the tip of the needle in the target area and negative aspiration for blood, a mixture of 40mg of triamcinolone in 3cc of 0 25% buivicaine was injected into the left subacromial subdeltoid bursa  The procedure was repeated in the exact same way on the opposite side  The patient tolerated the procedure well and there were no apparent complications  After an appropriate amount of observation, the patient was dismissed from the recovery area in good condition under their own power    COMMENTS:  The patient received a total steroid dose of 80 mg of triamcinolone

## 2023-02-09 ENCOUNTER — TELEPHONE (OUTPATIENT)
Dept: PAIN MEDICINE | Facility: CLINIC | Age: 48
End: 2023-02-09

## 2023-03-01 ENCOUNTER — HOSPITAL ENCOUNTER (OUTPATIENT)
Dept: NEUROLOGY | Facility: CLINIC | Age: 48
Discharge: HOME | End: 2023-03-01

## 2023-03-01 DIAGNOSIS — M79.605 LEFT LEG PAIN: ICD-10-CM

## 2023-03-01 DIAGNOSIS — R20.0 ARM NUMBNESS: ICD-10-CM

## 2023-03-02 ENCOUNTER — TELEPHONE (OUTPATIENT)
Dept: PAIN MEDICINE | Facility: CLINIC | Age: 48
End: 2023-03-02

## 2023-03-02 NOTE — TELEPHONE ENCOUNTER
S/w pt and advised of same  Pt asking for next steps in POC  Pt states post b/l shoulder inj on 2/2/23 w/ JW  he has "ok" relief in shoulders but is having neck soreness that has not resolved  Pt cont to take Tizanidine 2mg as needed and cont home PT  Lov 12/30/22 [address]    Pls advise  Thank you!

## 2023-03-27 ENCOUNTER — OFFICE VISIT (OUTPATIENT)
Dept: INTERNAL MEDICINE CLINIC | Facility: CLINIC | Age: 48
End: 2023-03-27

## 2023-03-27 VITALS
WEIGHT: 130.4 LBS | BODY MASS INDEX: 23.11 KG/M2 | SYSTOLIC BLOOD PRESSURE: 131 MMHG | TEMPERATURE: 98.3 F | HEART RATE: 69 BPM | OXYGEN SATURATION: 98 % | DIASTOLIC BLOOD PRESSURE: 88 MMHG | HEIGHT: 63 IN

## 2023-03-27 DIAGNOSIS — Z00.00 MEDICARE ANNUAL WELLNESS VISIT, SUBSEQUENT: Primary | ICD-10-CM

## 2023-03-27 DIAGNOSIS — D23.39 DERMAL NEVUS OF LEFT CHEEK: ICD-10-CM

## 2023-03-27 RX ORDER — QUETIAPINE FUMARATE 300 MG/1
TABLET, FILM COATED ORAL
COMMUNITY
Start: 2023-03-19

## 2023-03-27 NOTE — PATIENT INSTRUCTIONS
Medicare Preventive Visit Patient Instructions  Thank you for completing your Welcome to Medicare Visit or Medicare Annual Wellness Visit today  Your next wellness visit will be due in one year (3/27/2024)  The screening/preventive services that you may require over the next 5-10 years are detailed below  Some tests may not apply to you based off risk factors and/or age  Screening tests ordered at today's visit but not completed yet may show as past due  Also, please note that scanned in results may not display below  Preventive Screenings:  Service Recommendations Previous Testing/Comments   Colorectal Cancer Screening  · Colonoscopy    · Fecal Occult Blood Test (FOBT)/Fecal Immunochemical Test (FIT)  · Fecal DNA/Cologuard Test  · Flexible Sigmoidoscopy Age: 39-70 years old   Colonoscopy: every 10 years (May be performed more frequently if at higher risk)  OR  FOBT/FIT: every 1 year  OR  Cologuard: every 3 years  OR  Sigmoidoscopy: every 5 years  Screening may be recommended earlier than age 39 if at higher risk for colorectal cancer  Also, an individualized decision between you and your healthcare provider will decide whether screening between the ages of 74-80 would be appropriate   Colonoscopy: Not on file  FOBT/FIT: Not on file  Cologuard: Not on file  Sigmoidoscopy: Not on file          Prostate Cancer Screening Individualized decision between patient and health care provider in men between ages of 53-78   Medicare will cover every 12 months beginning on the day after your 50th birthday PSA: 0 9 ng/mL     Screening Not Indicated     Hepatitis C Screening Once for adults born between 1945 and 1965  More frequently in patients at high risk for Hepatitis C Hep C Antibody: Not on file        Diabetes Screening 1-2 times per year if you're at risk for diabetes or have pre-diabetes Fasting glucose: 97 mg/dL (3/17/2022)  A1C: 5 2 % (3/17/2022)      Cholesterol Screening Once every 5 years if you don't have a lipid disorder  May order more often based on risk factors  Lipid panel: 03/17/2022  Screening Not Indicated  History Lipid Disorder      Other Preventive Screenings Covered by Medicare:  1  Abdominal Aortic Aneurysm (AAA) Screening: covered once if your at risk  You're considered to be at risk if you have a family history of AAA or a male between the age of 73-68 who smoking at least 100 cigarettes in your lifetime  2  Lung Cancer Screening: covers low dose CT scan once per year if you meet all of the following conditions: (1) Age 50-69; (2) No signs or symptoms of lung cancer; (3) Current smoker or have quit smoking within the last 15 years; (4) You have a tobacco smoking history of at least 20 pack years (packs per day x number of years you smoked); (5) You get a written order from a healthcare provider  3  Glaucoma Screening: covered annually if you're considered high risk: (1) You have diabetes OR (2) Family history of glaucoma OR (3)  aged 48 and older OR (3)  American aged 72 and older  3  Osteoporosis Screening: covered every 2 years if you meet one of the following conditions: (1) Have a vertebral abnormality; (2) On glucocorticoid therapy for more than 3 months; (3) Have primary hyperparathyroidism; (4) On osteoporosis medications and need to assess response to drug therapy  5  HIV Screening: covered annually if you're between the age of 12-76  Also covered annually if you are younger than 13 and older than 72 with risk factors for HIV infection  For pregnant patients, it is covered up to 3 times per pregnancy      Immunizations:  Immunization Recommendations   Influenza Vaccine Annual influenza vaccination during flu season is recommended for all persons aged >= 6 months who do not have contraindications   Pneumococcal Vaccine   * Pneumococcal conjugate vaccine = PCV13 (Prevnar 13), PCV15 (Vaxneuvance), PCV20 (Prevnar 20)  * Pneumococcal polysaccharide vaccine = PPSV23 (Pneumovax) Adults 25-60 years old: 1-3 doses may be recommended based on certain risk factors  Adults 72 years old: 1-2 doses may be recommended based off what pneumonia vaccine you previously received   Hepatitis B Vaccine 3 dose series if at intermediate or high risk (ex: diabetes, end stage renal disease, liver disease)   Tetanus (Td) Vaccine - COST NOT COVERED BY MEDICARE PART B Following completion of primary series, a booster dose should be given every 10 years to maintain immunity against tetanus  Td may also be given as tetanus wound prophylaxis  Tdap Vaccine - COST NOT COVERED BY MEDICARE PART B Recommended at least once for all adults  For pregnant patients, recommended with each pregnancy  Shingles Vaccine (Shingrix) - COST NOT COVERED BY MEDICARE PART B  2 shot series recommended in those aged 48 and above     Health Maintenance Due:      Topic Date Due   • Hepatitis C Screening  Never done   • HIV Screening  Never done   • Colorectal Cancer Screening  Never done     Immunizations Due:  There are no preventive care reminders to display for this patient  Advance Directives   What are advance directives? Advance directives are legal documents that state your wishes and plans for medical care  These plans are made ahead of time in case you lose your ability to make decisions for yourself  Advance directives can apply to any medical decision, such as the treatments you want, and if you want to donate organs  What are the types of advance directives? There are many types of advance directives, and each state has rules about how to use them  You may choose a combination of any of the following:  · Living will: This is a written record of the treatment you want  You can also choose which treatments you do not want, which to limit, and which to stop at a certain time  This includes surgery, medicine, IV fluid, and tube feedings  · Durable power of  for healthcare [facility]):   This is a written record that states who you want to make healthcare choices for you when you are unable to make them for yourself  This person, called a proxy, is usually a family member or a friend  You may choose more than 1 proxy  · Do not resuscitate (DNR) order:  A DNR order is used in case your heart stops beating or you stop breathing  It is a request not to have certain forms of treatment, such as CPR  A DNR order may be included in other types of advance directives  · Medical directive: This covers the care that you want if you are in a coma, near death, or unable to make decisions for yourself  You can list the treatments you want for each condition  Treatment may include pain medicine, surgery, blood transfusions, dialysis, IV or tube feedings, and a ventilator (breathing machine)  · Values history: This document has questions about your views, beliefs, and how you feel and think about life  This information can help others choose the care that you would choose  Why are advance directives important? An advance directive helps you control your care  Although spoken wishes may be used, it is better to have your wishes written down  Spoken wishes can be misunderstood, or not followed  Treatments may be given even if you do not want them  An advance directive may make it easier for your family to make difficult choices about your care  © Copyright [address] 2018 Information is for End User's use only and may not be sold, redistributed or otherwise used for commercial purposes  All illustrations and images included in CareNotes® are the copyrighted property of [employer]  or [facility] Preventive Visit Patient Instructions  Thank you for completing your Welcome to Medicare Visit or Medicare Annual Wellness Visit today  Your next wellness visit will be due in one year (3/27/2024)  The screening/preventive services that you may require over the next 5-10 years are detailed below   Some tests may not apply to you based off risk factors and/or age  Screening tests ordered at today's visit but not completed yet may show as past due  Also, please note that scanned in results may not display below  Preventive Screenings:  Service Recommendations Previous Testing/Comments   Colorectal Cancer Screening  · Colonoscopy    · Fecal Occult Blood Test (FOBT)/Fecal Immunochemical Test (FIT)  · Fecal DNA/Cologuard Test  · Flexible Sigmoidoscopy Age: 39-70 years old   Colonoscopy: every 10 years (May be performed more frequently if at higher risk)  OR  FOBT/FIT: every 1 year  OR  Cologuard: every 3 years  OR  Sigmoidoscopy: every 5 years  Screening may be recommended earlier than age 39 if at higher risk for colorectal cancer  Also, an individualized decision between you and your healthcare provider will decide whether screening between the ages of 74-80 would be appropriate  Colonoscopy: Not on file  FOBT/FIT: Not on file  Cologuard: Not on file  Sigmoidoscopy: Not on file          Prostate Cancer Screening Individualized decision between patient and health care provider in men between ages of 53-78   Medicare will cover every 12 months beginning on the day after your 50th birthday PSA: 0 9 ng/mL     Screening Not Indicated     Hepatitis C Screening Once for adults born between 1945 and 1965  More frequently in patients at high risk for Hepatitis C Hep C Antibody: Not on file        Diabetes Screening 1-2 times per year if you're at risk for diabetes or have pre-diabetes Fasting glucose: 97 mg/dL (3/17/2022)  A1C: 5 2 % (3/17/2022)      Cholesterol Screening Once every 5 years if you don't have a lipid disorder  May order more often based on risk factors  Lipid panel: 03/17/2022  Screening Not Indicated  History Lipid Disorder      Other Preventive Screenings Covered by Medicare:  6  Abdominal Aortic Aneurysm (AAA) Screening: covered once if your at risk   You're considered to be at risk if you have a family history of AAA or a male between the age of 73-68 who smoking at least 100 cigarettes in your lifetime  7  Lung Cancer Screening: covers low dose CT scan once per year if you meet all of the following conditions: (1) Age 50-69; (2) No signs or symptoms of lung cancer; (3) Current smoker or have quit smoking within the last 15 years; (4) You have a tobacco smoking history of at least 20 pack years (packs per day x number of years you smoked); (5) You get a written order from a healthcare provider  8  Glaucoma Screening: covered annually if you're considered high risk: (1) You have diabetes OR (2) Family history of glaucoma OR (3)  aged 48 and older OR (3)  American aged 72 and older  5  Osteoporosis Screening: covered every 2 years if you meet one of the following conditions: (1) Have a vertebral abnormality; (2) On glucocorticoid therapy for more than 3 months; (3) Have primary hyperparathyroidism; (4) On osteoporosis medications and need to assess response to drug therapy  10  HIV Screening: covered annually if you're between the age of 12-76  Also covered annually if you are younger than 13 and older than 72 with risk factors for HIV infection  For pregnant patients, it is covered up to 3 times per pregnancy      Immunizations:  Immunization Recommendations   Influenza Vaccine Annual influenza vaccination during flu season is recommended for all persons aged >= 6 months who do not have contraindications   Pneumococcal Vaccine   * Pneumococcal conjugate vaccine = PCV13 (Prevnar 13), PCV15 (Vaxneuvance), PCV20 (Prevnar 20)  * Pneumococcal polysaccharide vaccine = PPSV23 (Pneumovax) Adults 25-60 years old: 1-3 doses may be recommended based on certain risk factors  Adults 72 years old: 1-2 doses may be recommended based off what pneumonia vaccine you previously received   Hepatitis B Vaccine 3 dose series if at intermediate or high risk (ex: diabetes, end stage renal disease, liver disease)   Tetanus (Td) Vaccine - COST NOT COVERED BY MEDICARE PART B Following completion of primary series, a booster dose should be given every 10 years to maintain immunity against tetanus  Td may also be given as tetanus wound prophylaxis  Tdap Vaccine - COST NOT COVERED BY MEDICARE PART B Recommended at least once for all adults  For pregnant patients, recommended with each pregnancy  Shingles Vaccine (Shingrix) - COST NOT COVERED BY MEDICARE PART B  2 shot series recommended in those aged 48 and above     Health Maintenance Due:      Topic Date Due   • Hepatitis C Screening  Never done   • HIV Screening  Never done   • Colorectal Cancer Screening  Never done     Immunizations Due:  There are no preventive care reminders to display for this patient  Advance Directives   What are advance directives? Advance directives are legal documents that state your wishes and plans for medical care  These plans are made ahead of time in case you lose your ability to make decisions for yourself  Advance directives can apply to any medical decision, such as the treatments you want, and if you want to donate organs  What are the types of advance directives? There are many types of advance directives, and each state has rules about how to use them  You may choose a combination of any of the following:  · Living will: This is a written record of the treatment you want  You can also choose which treatments you do not want, which to limit, and which to stop at a certain time  This includes surgery, medicine, IV fluid, and tube feedings  · Durable power of  for healthcare [place] [facility]): This is a written record that states who you want to make healthcare choices for you when you are unable to make them for yourself  This person, called a proxy, is usually a family member or a friend  You may choose more than 1 proxy  · Do not resuscitate (DNR) order:  A DNR order is used in case your heart stops beating or you stop breathing   It is a request not to have certain forms of treatment, such as CPR  A DNR order may be included in other types of advance directives  · Medical directive: This covers the care that you want if you are in a coma, near death, or unable to make decisions for yourself  You can list the treatments you want for each condition  Treatment may include pain medicine, surgery, blood transfusions, dialysis, IV or tube feedings, and a ventilator (breathing machine)  · Values history: This document has questions about your views, beliefs, and how you feel and think about life  This information can help others choose the care that you would choose  Why are advance directives important? An advance directive helps you control your care  Although spoken wishes may be used, it is better to have your wishes written down  Spoken wishes can be misunderstood, or not followed  Treatments may be given even if you do not want them  An advance directive may make it easier for your family to make difficult choices about your care  © Copyright [employer] 2018 Information is for End User's use only and may not be sold, redistributed or otherwise used for commercial purposes  All illustrations and images included in CareNotes® are the copyrighted property of [facility] [employer]  or [facility] Preventive Visit Patient Instructions  Thank you for completing your Welcome to Medicare Visit or Medicare Annual Wellness Visit today  Your next wellness visit will be due in one year (3/27/2024)  The screening/preventive services that you may require over the next 5-10 years are detailed below  Some tests may not apply to you based off risk factors and/or age  Screening tests ordered at today's visit but not completed yet may show as past due  Also, please note that scanned in results may not display below    Preventive Screenings:  Service Recommendations Previous Testing/Comments   Colorectal Cancer Screening  · Colonoscopy · Fecal Occult Blood Test (FOBT)/Fecal Immunochemical Test (FIT)  · Fecal DNA/Cologuard Test  · Flexible Sigmoidoscopy Age: 39-70 years old   Colonoscopy: every 10 years (May be performed more frequently if at higher risk)  OR  FOBT/FIT: every 1 year  OR  Cologuard: every 3 years  OR  Sigmoidoscopy: every 5 years  Screening may be recommended earlier than age 39 if at higher risk for colorectal cancer  Also, an individualized decision between you and your healthcare provider will decide whether screening between the ages of 74-80 would be appropriate  Colonoscopy: Not on file  FOBT/FIT: Not on file  Cologuard: Not on file  Sigmoidoscopy: Not on file          Prostate Cancer Screening Individualized decision between patient and health care provider in men between ages of 53-78   Medicare will cover every 12 months beginning on the day after your 50th birthday PSA: 0 9 ng/mL     Screening Not Indicated     Hepatitis C Screening Once for adults born between 1945 and 1965  More frequently in patients at high risk for Hepatitis C Hep C Antibody: Not on file        Diabetes Screening 1-2 times per year if you're at risk for diabetes or have pre-diabetes Fasting glucose: 97 mg/dL (3/17/2022)  A1C: 5 2 % (3/17/2022)      Cholesterol Screening Once every 5 years if you don't have a lipid disorder  May order more often based on risk factors  Lipid panel: 03/17/2022  Screening Not Indicated  History Lipid Disorder      Other Preventive Screenings Covered by Medicare:  11  Abdominal Aortic Aneurysm (AAA) Screening: covered once if your at risk  You're considered to be at risk if you have a family history of AAA or a male between the age of 73-68 who smoking at least 100 cigarettes in your lifetime    12  Lung Cancer Screening: covers low dose CT scan once per year if you meet all of the following conditions: (1) Age 50-69; (2) No signs or symptoms of lung cancer; (3) Current smoker or have quit smoking within the last 15 years; (4) You have a tobacco smoking history of at least 20 pack years (packs per day x number of years you smoked); (5) You get a written order from a healthcare provider  15  Glaucoma Screening: covered annually if you're considered high risk: (1) You have diabetes OR (2) Family history of glaucoma OR (3)  aged 48 and older OR (3)  American aged 72 and older  15  Osteoporosis Screening: covered every 2 years if you meet one of the following conditions: (1) Have a vertebral abnormality; (2) On glucocorticoid therapy for more than 3 months; (3) Have primary hyperparathyroidism; (4) On osteoporosis medications and need to assess response to drug therapy  15  HIV Screening: covered annually if you're between the age of 12-76  Also covered annually if you are younger than 13 and older than 72 with risk factors for HIV infection  For pregnant patients, it is covered up to 3 times per pregnancy  Immunizations:  Immunization Recommendations   Influenza Vaccine Annual influenza vaccination during flu season is recommended for all persons aged >= 6 months who do not have contraindications   Pneumococcal Vaccine   * Pneumococcal conjugate vaccine = PCV13 (Prevnar 13), PCV15 (Vaxneuvance), PCV20 (Prevnar 20)  * Pneumococcal polysaccharide vaccine = PPSV23 (Pneumovax) Adults 25-60 years old: 1-3 doses may be recommended based on certain risk factors  Adults 72 years old: 1-2 doses may be recommended based off what pneumonia vaccine you previously received   Hepatitis B Vaccine 3 dose series if at intermediate or high risk (ex: diabetes, end stage renal disease, liver disease)   Tetanus (Td) Vaccine - COST NOT COVERED BY MEDICARE PART B Following completion of primary series, a booster dose should be given every 10 years to maintain immunity against tetanus  Td may also be given as tetanus wound prophylaxis     Tdap Vaccine - COST NOT COVERED BY MEDICARE PART B Recommended at least once for all adults  For pregnant patients, recommended with each pregnancy  Shingles Vaccine (Shingrix) - COST NOT COVERED BY MEDICARE PART B  2 shot series recommended in those aged 48 and above     Health Maintenance Due:      Topic Date Due   • Hepatitis C Screening  Never done   • HIV Screening  Never done   • Colorectal Cancer Screening  Never done     Immunizations Due:  There are no preventive care reminders to display for this patient  Advance Directives   What are advance directives? Advance directives are legal documents that state your wishes and plans for medical care  These plans are made ahead of time in case you lose your ability to make decisions for yourself  Advance directives can apply to any medical decision, such as the treatments you want, and if you want to donate organs  What are the types of advance directives? There are many types of advance directives, and each state has rules about how to use them  You may choose a combination of any of the following:  · Living will: This is a written record of the treatment you want  You can also choose which treatments you do not want, which to limit, and which to stop at a certain time  This includes surgery, medicine, IV fluid, and tube feedings  · Durable power of  for healthcare [place] [facility]): This is a written record that states who you want to make healthcare choices for you when you are unable to make them for yourself  This person, called a proxy, is usually a family member or a friend  You may choose more than 1 proxy  · Do not resuscitate (DNR) order:  A DNR order is used in case your heart stops beating or you stop breathing  It is a request not to have certain forms of treatment, such as CPR  A DNR order may be included in other types of advance directives  · Medical directive: This covers the care that you want if you are in a coma, near death, or unable to make decisions for yourself   You can list the treatments you want for each condition  Treatment may include pain medicine, surgery, blood transfusions, dialysis, IV or tube feedings, and a ventilator (breathing machine)  · Values history: This document has questions about your views, beliefs, and how you feel and think about life  This information can help others choose the care that you would choose  Why are advance directives important? An advance directive helps you control your care  Although spoken wishes may be used, it is better to have your wishes written down  Spoken wishes can be misunderstood, or not followed  Treatments may be given even if you do not want them  An advance directive may make it easier for your family to make difficult choices about your care  © Copyright [address] 2018 Information is for End User's use only and may not be sold, redistributed or otherwise used for commercial purposes   All illustrations and images included in CareNotes® are the copyrighted property of [employer]  or [address] [place] [address] [facility]

## 2023-03-27 NOTE — PROGRESS NOTES
Assessment and Plan:     Problem List Items Addressed This Visit    None       Preventive health issues were discussed with patient, and age appropriate screening tests were ordered as noted in patient's After Visit Summary  Personalized health advice and appropriate referrals for health education or preventive services given if needed, as noted in patient's After Visit Summary  History of Present Illness:     Patient presents for a Medicare Wellness Visit    ***    Interested in colonoscopy  Notes mole on left cheek is getting bigger  Has been there for years  No change in borders  Sometimes itchy  Denies erythema, pain         Patient Care Team:  Israel Camacho DO as PCP - General (Internal Medicine)     Review of Systems:     Review of Systems     Problem List:     Patient Active Problem List   Diagnosis   • Bipolar disease, chronic ([facility] [address] )   • Chronic bilateral low back pain with left-sided sciatica   • Depression with anxiety   • Fibromyalgia   • Hypogonadism male   • Chronic neck pain   • Pure hypercholesterolemia   • Other hemorrhoids   • Chronic idiopathic constipation   • Benign localized prostatic hyperplasia with lower urinary tract symptoms (LUTS)   • Cervical radiculopathy   • Lumbar radiculopathy   • Chronic pain of both shoulders   • Left leg pain   • Arm numbness      Past Medical and Surgical History:     Past Medical History:   Diagnosis Date   • Anxiety    • [facility]    • Chronic back pain     with left sided sciatica   • Constipation    • Fibromyalgia, primary    • Hemorrhoids     last assessed - 09Aug2012   • Hyperlipidemia      Past Surgical History:   Procedure Laterality Date   • CERVICAL FUSION      Cervical Vertbral fusion   • [place] EXC VARICOCELE/LIGATION SPERMATIC VEINS SPX Left 8/27/2019    Procedure: VARICOCELECTOMY;  Surgeon: Butch Reeves MD;  Location: [facility]  MAIN [facility];  Service: Urology   • [place] TRURL ELECTROSURG RESCJ PROSTATE BLEED COMPLETE N/A 11/17/2020 Procedure: TRANSURETHRAL VAPORIZATION OF PROSTATE (TURP); Surgeon: Kamaljit Kc MD;  Location: [facility] Main OR;  Service: Urology      Family History:     Family History   Problem Relation Age of Onset   • Arthritis Mother    • Hyperlipidemia Mother    • Hyperthyroidism Father    • Other Father         Prostate disease   • Colon cancer Sister 40   • Other Sister         Epilepsy   • Asthma Brother       Social History:     Social History     Socioeconomic History   • Marital status: /Civil Union     Spouse name: None   • Number of children: None   • Years of education: None   • Highest education level: None   Occupational History   • Occupation: Disabled   Tobacco Use   • Smoking status: Never   • Smokeless tobacco: Never   Vaping Use   • Vaping Use: Never used   Substance and Sexual Activity   • Alcohol use: No   • Drug use: No   • Sexual activity: Yes   Other Topics Concern   • None   Social History Narrative   • None     Social Determinants of Health     Financial Resource Strain: Low Risk    • Difficulty of Paying Living Expenses: Not very hard   Food Insecurity: No Food Insecurity   • Worried About Running Out of Food in the Last Year: Never true   • Ran Out of Food in the Last Year: Never true   Transportation Needs: No Transportation Needs   • Lack of Transportation (Medical): No   • Lack of Transportation (Non-Medical):  No   Physical Activity: Not on file   Stress: Not on file   Social Connections: Not on file   Intimate Partner Violence: Not on file   Housing Stability: Low Risk    • Unable to Pay for Housing in the Last Year: No   • Number of Places Lived in the Last Year: 1   • Unstable Housing in the Last Year: No      Medications and Allergies:     Current Outpatient Medications   Medication Sig Dispense Refill   • celecoxib (CeleBREX) 100 mg capsule Take 1 capsule (100 mg total) by mouth 2 (two) times a day as needed for mild pain 60 capsule 1   • QUEtiapine (SEROquel) 300 mg tablet      • "tiZANidine (ZANAFLEX) 2 mg tablet Take 1 tablet (2 mg total) by mouth every 8 (eight) hours as needed for muscle spasms 90 tablet 1   • acetaminophen (TYLENOL) 650 mg CR tablet Take 1 tablet (650 mg total) by mouth every 8 (eight) hours as needed for mild pain (Patient not taking: Reported on 10/11/2022) 30 tablet 0   • lamoTRIgine (LaMICtal) 25 mg tablet Take 50 mg by mouth in the morning      • meloxicam (Mobic) 7 5 mg tablet Take 1 tablet (7 5 mg total) by mouth daily as needed for moderate pain 30 tablet 0   • QUEtiapine (SEROquel) 400 MG tablet Take 300 mg by mouth daily at bedtime  (Patient not taking: Reported on 3/27/2023)       No current facility-administered medications for this visit  No Known Allergies   Immunizations:     Immunization History   Administered Date(s) Administered   • Influenza Quadrivalent, 6-35 Months IM 12/22/2016, 10/19/2017   • Influenza, injectable, quadrivalent, preservative free 0 5 mL 09/12/2018   • Influenza, seasonal, injectable 11/26/2013      Health Maintenance:         Topic Date Due   • Hepatitis C Screening  Never done   • HIV Screening  Never done   • Colorectal Cancer Screening  Never done     There are no preventive care reminders to display for this patient  Medicare Screening Tests and Risk Assessments:     Annual Wellness Visit  No results found       Physical Exam:     /88 (BP Location: Left arm, Patient Position: Sitting, Cuff Size: Standard)   Pulse 69   Temp 98 3 °F (36 8 °C) (Temporal)   Ht 5' 3\" (1 6 m)   Wt 59 1 kg (130 lb 6 4 oz)   SpO2 98%   BMI 23 10 kg/m²     Physical Exam     Lalita Presley, DO  "

## 2023-03-27 NOTE — PROGRESS NOTES
Assessment and Plan:     Problem List Items Addressed This Visit    None  Visit Diagnoses     Medicare annual wellness visit, subsequent    -  Primary  No acute complaints  · Routine blood ordered as below  · Ordered screening for HIV and HCV  · Referral for colonoscopy      Relevant Orders    CBC and differential    Comprehensive metabolic panel    Lipid panel    TSH, 3rd generation with Free T4 reflex    HIV 1/2 AG/AB w Reflex [facility] for 2 yr old and above    Hepatitis C Ab W/Refl To HCV RNA, Qn, PCR    Dermal nevus of left cheek      His wife said that the small brown on spot on left cheek has become bigger in the last few months  States is has been there for years  Sometimes itchy after shaving  Denies erythema, pain, discharge, smoking, change in borders, color  On exam, Brown, macular oval on left cheek with smooth borders approx  2-3 mm  Most likely benign nevus but will refer to dermatology given increase in size  · Referral to dermatology       Relevant Orders    Ambulatory referral to Dermatology           Preventive health issues were discussed with patient, and age appropriate screening tests were ordered as noted in patient's After Visit Summary  Personalized health advice and appropriate referrals for health education or preventive services given if needed, as noted in patient's After Visit Summary  History of Present Illness:     Patient presents for a Medicare Wellness Visit    49year old male with history of BPD, cervical and lumbar radiculopathy s/p ACDF C5-6, BPH s/p TURP  Interested in colonoscopy and obtaining routine blood work  Going on vacation to [facility] for 2 weeks in May to visit his family  Endorses chronic neck and back pain  Takes tizanidine PRN about once a week  Stretches every day  Following with pain management - s/p bilateral subacromial injections in February      His wife told him that the mole on left cheek is getting bigger over the last few months  Has been there for years  No change in borders  Sometimes itchy after shaving  Denies erythema, pain, discharge, change in borders, color  Never smoked  Patient Care Team:  Carter Augustine DO as PCP - General (Internal Medicine)     Review of Systems:     Review of Systems   Constitutional: Negative for appetite change, chills, diaphoresis and fever  HENT: Negative for congestion, ear pain, rhinorrhea and sore throat  Eyes: Negative for visual disturbance  Respiratory: Negative for cough and shortness of breath  Cardiovascular: Negative for chest pain, palpitations and leg swelling  Gastrointestinal: Negative for abdominal pain, constipation, diarrhea, nausea and vomiting  Genitourinary: Negative for difficulty urinating, dysuria and hematuria  Musculoskeletal: Positive for back pain and neck pain  Skin: Negative for rash  Neurological: Negative for dizziness, weakness, light-headedness, numbness and headaches          Problem List:     Patient Active Problem List   Diagnosis   • Bipolar disease, chronic ([facility])   • Chronic bilateral low back pain with left-sided sciatica   • Depression with anxiety   • Fibromyalgia   • Hypogonadism male   • Chronic neck pain   • Pure hypercholesterolemia   • Other hemorrhoids   • Chronic idiopathic constipation   • Benign localized prostatic hyperplasia with lower urinary tract symptoms (LUTS)   • Cervical radiculopathy   • Lumbar radiculopathy   • Chronic pain of both shoulders   • Left leg pain   • Arm numbness      Past Medical and Surgical History:     Past Medical History:   Diagnosis Date   • Anxiety    • Bipolar disorder ([facility] [address] )    • Chronic back pain     with left sided sciatica   • Constipation    • Fibromyalgia, primary    • Hemorrhoids     last assessed - 09Aug2012   • Hyperlipidemia      Past Surgical History:   Procedure Laterality Date   • CERVICAL FUSION      Cervical Vertbral fusion   • IN EXC VARICOCELE/LIGATION SPERMATIC VEINS SPX Left 8/27/2019    Procedure: VARICOCELECTOMY;  Surgeon: Chrissie Moeller MD;  Location: [facility]  MAIN [facility];  Service: Urology   • [place] TRURL ELECTROSURG RESCJ PROSTATE BLEED COMPLETE N/A 11/17/2020    Procedure: TRANSURETHRAL VAPORIZATION OF PROSTATE (TURP); Surgeon: Chrissie Moeller MD;  Location: [facility] Main [facility];  Service: Urology      Family History:     Family History   Problem Relation Age of Onset   • Arthritis Mother    • Hyperlipidemia Mother    • Hyperthyroidism Father    • Other Father         Prostate disease   • Colon cancer Sister 40   • Other Sister         Epilepsy   • Asthma Brother       Social History:     Social History     Socioeconomic History   • Marital status: /Civil Union     Spouse name: None   • Number of children: None   • Years of education: None   • Highest education level: None   Occupational History   • Occupation: Disabled   Tobacco Use   • Smoking status: Never   • Smokeless tobacco: Never   Vaping Use   • Vaping Use: Never used   Substance and Sexual Activity   • Alcohol use: No   • Drug use: No   • Sexual activity: Yes   Other Topics Concern   • None   Social History Narrative   • None     Social Determinants of Health     Financial Resource Strain: Low Risk    • Difficulty of Paying Living Expenses: Not very hard   Food Insecurity: No Food Insecurity   • Worried About Running Out of Food in the Last Year: Never true   • Ran Out of Food in the Last Year: Never true   Transportation Needs: No Transportation Needs   • Lack of Transportation (Medical): No   • Lack of Transportation (Non-Medical):  No   Physical Activity: Not on file   Stress: Not on file   Social Connections: Not on file   Intimate Partner Violence: Not on file   Housing Stability: Low Risk    • Unable to Pay for Housing in the Last Year: No   • Number of Places Lived in the Last Year: 1   • Unstable Housing in the Last Year: No      Medications and Allergies:     Current Outpatient Medications   Medication Sig Dispense Refill   • celecoxib (CeleBREX) 100 mg capsule Take 1 capsule (100 mg total) by mouth 2 (two) times a day as needed for mild pain 60 capsule 1   • QUEtiapine (SEROquel) 300 mg tablet      • tiZANidine (ZANAFLEX) 2 mg tablet Take 1 tablet (2 mg total) by mouth every 8 (eight) hours as needed for muscle spasms 90 tablet 1   • acetaminophen (TYLENOL) 650 mg CR tablet Take 1 tablet (650 mg total) by mouth every 8 (eight) hours as needed for mild pain (Patient not taking: Reported on 10/11/2022) 30 tablet 0   • lamoTRIgine (LaMICtal) 25 mg tablet Take 50 mg by mouth in the morning      • meloxicam (Mobic) 7 5 mg tablet Take 1 tablet (7 5 mg total) by mouth daily as needed for moderate pain 30 tablet 0   • QUEtiapine (SEROquel) 400 MG tablet Take 300 mg by mouth daily at bedtime  (Patient not taking: Reported on 3/27/2023)       No current facility-administered medications for this visit  No Known Allergies   Immunizations:     Immunization History   Administered Date(s) Administered   • Influenza Quadrivalent, 6-35 Months IM 12/22/2016, 10/19/2017   • Influenza, injectable, quadrivalent, preservative free 0 5 mL 09/12/2018   • Influenza, seasonal, injectable 11/26/2013      Health Maintenance:         Topic Date Due   • Hepatitis C Screening  Never done   • HIV Screening  Never done   • Colorectal Cancer Screening  Never done     There are no preventive care reminders to display for this patient  Medicare Screening Tests and Risk Assessments:         Health Risk Assessment:   Patient rates overall health as fair  Patient feels that their physical health rating is same  Patient is satisfied with their life  Eyesight was rated as slightly worse  Hearing was rated as same  Patient feels that their emotional and mental health rating is same  Patients states they are sometimes angry  Patient states they are often unusually tired/fatigued  Pain experienced in the last 7 days has been a lot   Patient's pain rating has been 8/10  Patient states that he has experienced no weight loss or gain in last 6 months  Fall Risk Screening: In the past year, patient has experienced: no history of falling in past year      Home Safety:  Patient does not have trouble with stairs inside or outside of their home  Patient has working smoke alarms and has working carbon monoxide detector  Home safety hazards include: none  Nutrition:   Current diet is Regular  Medications:   Patient is not currently taking any over-the-counter supplements  Patient is able to manage medications  Activities of Daily Living (ADLs)/Instrumental Activities of Daily Living (IADLs):   Walk and transfer into and out of bed and chair?: Yes  Dress and groom yourself?: Yes    Bathe or shower yourself?: Yes    Feed yourself? Yes  Do your laundry/housekeeping?: Yes  Manage your money, pay your bills and track your expenses?: Yes  Make your own meals?: Yes    Do your own shopping?: Yes    Previous Hospitalizations:   Any hospitalizations or ED visits within the last 12 months?: No      PREVENTIVE SCREENINGS      Cardiovascular Screening:    General: Screening Not Indicated and History Lipid Disorder      Prostate Cancer Screening:    General: Screening Not Indicated      Lung Cancer Screening:     General: Screening Not Indicated    Screening, Brief Intervention, and Referral to Treatment (SBIRT)    Screening  Typical number of drinks in a day: 0  Typical number of drinks in a week: 0  Interpretation: Low risk drinking behavior  Single Item Drug Screening:  How often have you used an illegal drug (including marijuana) or a prescription medication for non-medical reasons in the past year? never    Single Item Drug Screen Score: 0  Interpretation: Negative screen for possible drug use disorder    No results found       Physical Exam:     /88 (BP Location: Left arm, Patient Position: Sitting, Cuff Size: Standard)   Pulse 69   Temp 98 3 °F (36 8 °C) (Temporal) "  Ht 5' 3\" (1 6 m)   Wt 59 1 kg (130 lb 6 4 oz)   SpO2 98%   BMI 23 10 kg/m²     Physical Exam  Vitals reviewed  Constitutional:       General: He is not in acute distress  HENT:      Head: Normocephalic and atraumatic  Nose: No rhinorrhea  Eyes:      General: No scleral icterus  Cardiovascular:      Rate and Rhythm: Normal rate and regular rhythm  Pulses: Normal pulses  Heart sounds: Normal heart sounds  No murmur heard  No friction rub  No gallop  Pulmonary:      Effort: Pulmonary effort is normal  No respiratory distress  Breath sounds: Normal breath sounds  No wheezing, rhonchi or rales  Abdominal:      General: There is no distension  Palpations: Abdomen is soft  There is no mass  Tenderness: There is no abdominal tenderness  There is no guarding  Musculoskeletal:      Right lower leg: No edema  Left lower leg: No edema  Skin:     General: Skin is warm and dry  Coloration: Skin is not jaundiced  Comments: Brown, macular oval on left cheek with smooth borders approx  2-3 mm   Neurological:      Mental Status: He is alert  Cranial Nerves: No dysarthria        Comments: CN 2-12 grossly intact, moves all 4 extremities   Psychiatric:         Mood and Affect: Mood normal          Behavior: Behavior normal           Theodoro Costain Minnix, DO    "

## 2023-03-30 NOTE — PROGRESS NOTES
Assessment:  1  Myofascial pain syndrome    2  Neck pain        Plan:  1  I will increase tizanidine to 4mg Q8 hours PRN myofascial pain  I advised the patient that if they experience any side effects or issues with the changes in their medication regiment, they should give our office a call to discuss  I also advised the patient not to drive or operate machinery until they see how the changes in the medication regimen affects them  The patient was agreeable and verbalized an understanding  2  I will schedule the patient for TPIs into the bilateral cervical paraspinal musculature to address the myofascial component of the patient's pain  3  Continue with home exercise program  4  Continue topical heat application PRN, no longer than 20 minutes per hour  5  Follow up after procedure or sooner if needed    History of Present Illness: The patient is a 49 y o  male with a history of C5-6 fusion, fibromyalgia and bipolar disorder last seen on 12/30/2022 who presents for a follow up office visit in regards to chronic neck pain with associated paresthesias into the bilateral upper extremities no specific dermatomal distribution  He denies bowel or bladder incontinence or saddle anesthesia  He states that yesterday, he started having worsening neck pain without inciting event or trauma  He did try taking tizanidine 2 mg as needed without much relief  He continues with heat application and stretches as taught by physical therapy  He recently had an EMGof the upper extremities and left lower extremity which was normal   MRI of the cervical spine shows a stable ACDF at C5-6 otherwise normal   Dynamic x-ray of the cervical spine was normal   He uses Tylenol on a as needed basis  He has not found relief with NSAIDs  He did have bilateral subacromial bursa injections on February 2, 2023 which helped localize shoulder pain  Patient rates his neck pain is 10 out of 10 on the numeric pain rating scale    He constant has pain throughout the day which is described as burning, dull aching, sharp, throbbing, cramping, pressure-like, shooting, numbness and pins-and-needles    I have personally reviewed and/or updated the patient's past medical history, past surgical history, family history, social history, current medications, allergies, and vital signs today  Review of Systems:    Review of Systems   Respiratory: Negative for shortness of breath  Cardiovascular: Negative for chest pain  Gastrointestinal: Positive for nausea  Negative for constipation, diarrhea and vomiting  Musculoskeletal: Negative for arthralgias, gait problem, joint swelling and myalgias  Skin: Negative for rash  Neurological: Positive for dizziness and headaches  Negative for seizures and weakness  All other systems reviewed and are negative  Past Medical History:   Diagnosis Date   • Anxiety    • Bipolar disorder ([facility] [address] )    • Chronic back pain     with left sided sciatica   • Constipation    • Fibromyalgia, primary    • Hemorrhoids     last assessed - 09Aug2012   • Hyperlipidemia        Past Surgical History:   Procedure Laterality Date   • CERVICAL FUSION      Cervical Vertbral fusion   • [place] EXC VARICOCELE/LIGATION SPERMATIC VEINS SPX Left 8/27/2019    Procedure: VARICOCELECTOMY;  Surgeon: Ralph Shipley MD;  Location: [facility]  MAIN OR;  Service: Urology   • [place] TRURL ELECTROSURG [address] [place] [address] N/A 11/17/2020    Procedure: TRANSURETHRAL VAPORIZATION OF PROSTATE (TURP);   Surgeon: Ralph Shipley MD;  Location: [facility];  Service: Urology       Family History   Problem Relation Age of Onset   • Arthritis Mother    • Hyperlipidemia Mother    • Hyperthyroidism Father    • Other Father         Prostate disease   • Colon cancer Sister 40   • Other Sister         Epilepsy   • Asthma Brother        Social History     Occupational History   • Occupation: Disabled   Tobacco Use   • Smoking status: Never   • Smokeless tobacco: "Never   Vaping Use   • Vaping Use: Never used   Substance and Sexual Activity   • Alcohol use: No   • Drug use: No   • Sexual activity: Yes         Current Outpatient Medications:   •  acetaminophen (Tylenol) 325 mg tablet, Take 650 mg by mouth every 6 (six) hours as needed for mild pain, Disp: , Rfl:   •  QUEtiapine (SEROquel) 300 mg tablet, , Disp: , Rfl:   •  tiZANidine (ZANAFLEX) 4 mg tablet, Take 1 tablet (4 mg total) by mouth every 8 (eight) hours as needed for muscle spasms, Disp: 90 tablet, Rfl: 1  •  lamoTRIgine (LaMICtal) 25 mg tablet, Take 50 mg by mouth in the morning , Disp: , Rfl:     No Known Allergies    Physical Exam:    /86   Pulse 69   Ht 5' 3\" (1 6 m)   Wt 59 kg (130 lb)   BMI 23 03 kg/m²     Constitutional:normal, well developed, well nourished, alert, in no distress and non-toxic and no overt pain behavior  Eyes:anicteric  HEENT:grossly intact  Neck:supple, symmetric, trachea midline and no masses   Pulmonary:even and unlabored  Cardiovascular:No edema or pitting edema present  Skin:Normal without rashes or lesions and well hydrated  Psychiatric:Mood and affect appropriate  Neurologic:Cranial Nerves II-XII grossly intact  Musculoskeletal:normal gait  Bilateral cervical paraspinal and trapezii musculature tender to palpation      Imaging  No orders to display     CERVICAL SPINE   INDICATION: M54 12: Radiculopathy, cervical region  COMPARISON: Compared with 11/11/2011   VIEWS: XR SPINE CERVICAL COMPLETE 6+ VW FLEX /EXT /OBL   FINDINGS:   No fracture  Straightening of the cervical lordosis may be related to patient positioning or muscle spasm  Disc prosthesis with ACDF at C5-C6  Alignment is maintained  No subluxation on flexion-extension views  No evidence of dynamic instability seen with flexion or extension  The prevertebral soft tissues are within normal limits  The lung apices are clear  IMPRESSION:   ACDF at C5-C6 with no subluxation      MRI CERVICAL SPINE WITHOUT " CONTRAST   INDICATION: M54 12: Radiculopathy, cervical region  COMPARISON: None  TECHNIQUE: Multiplanar, multisequence imaging of the cervical spine was performed      IMAGE QUALITY: Diagnostic   FINDINGS:   ALIGNMENT: Status post ACDF at C5-6  MARROW SIGNAL: Normal marrow signal is identified within the visualized bony structures  No discrete marrow lesion  CERVICAL AND VISUALIZED THORACIC CORD: Normal signal within the visualized cord  PREVERTEBRAL AND PARASPINAL SOFT TISSUES: Normal    VISUALIZED POSTERIOR FOSSA: The visualized posterior fossa demonstrates no abnormal signal    CERVICAL DISC SPACES:   C2-C3: Normal    C3-C4: Normal    C4-C5: Normal    C5-C6: Status post ACDF  No central or foraminal narrowing  C6-C7: Small central protrusion type disc herniation without significant central or foraminal narrowing  C7-T1: Normal    UPPER THORACIC DISC SPACES: Normal    OTHER FINDINGS: None  IMPRESSION:   Postoperative changes of ACDF at C5-6 with stable postoperative appearance  Tiny central protrusion type disc herniation C6-7 without central or foraminal      No orders of the defined types were placed in this encounter

## 2023-03-31 ENCOUNTER — OFFICE VISIT (OUTPATIENT)
Dept: PAIN MEDICINE | Facility: CLINIC | Age: 48
End: 2023-03-31

## 2023-03-31 VITALS
SYSTOLIC BLOOD PRESSURE: 127 MMHG | DIASTOLIC BLOOD PRESSURE: 86 MMHG | HEIGHT: 63 IN | WEIGHT: 130 LBS | HEART RATE: 69 BPM | BODY MASS INDEX: 23.04 KG/M2

## 2023-03-31 DIAGNOSIS — M54.2 NECK PAIN: ICD-10-CM

## 2023-03-31 DIAGNOSIS — M79.18 MYOFASCIAL PAIN SYNDROME: Primary | ICD-10-CM

## 2023-03-31 RX ORDER — ACETAMINOPHEN 325 MG/1
650 TABLET ORAL EVERY 6 HOURS PRN
COMMUNITY

## 2023-03-31 RX ORDER — TIZANIDINE 4 MG/1
4 TABLET ORAL EVERY 8 HOURS PRN
Qty: 90 TABLET | Refills: 1 | Status: SHIPPED | OUTPATIENT
Start: 2023-03-31

## 2023-04-03 ENCOUNTER — APPOINTMENT (OUTPATIENT)
Dept: LAB | Facility: CLINIC | Age: 48
End: 2023-04-03

## 2023-04-03 DIAGNOSIS — Z00.00 MEDICARE ANNUAL WELLNESS VISIT, SUBSEQUENT: ICD-10-CM

## 2023-04-03 LAB
ALBUMIN SERPL BCP-MCNC: 3.7 G/DL (ref 3.5–5)
ALP SERPL-CCNC: 58 U/L (ref 46–116)
ALT SERPL W P-5'-P-CCNC: 30 U/L (ref 12–78)
ANION GAP SERPL CALCULATED.3IONS-SCNC: 3 MMOL/L (ref 4–13)
AST SERPL W P-5'-P-CCNC: 20 U/L (ref 5–45)
BASOPHILS # BLD AUTO: 0.02 THOUSANDS/ÂΜL (ref 0–0.1)
BASOPHILS NFR BLD AUTO: 0 % (ref 0–1)
BILIRUB SERPL-MCNC: 0.39 MG/DL (ref 0.2–1)
BUN SERPL-MCNC: 24 MG/DL (ref 5–25)
CALCIUM SERPL-MCNC: 9.8 MG/DL (ref 8.3–10.1)
CHLORIDE SERPL-SCNC: 105 MMOL/L (ref 96–108)
CHOLEST SERPL-MCNC: 184 MG/DL
CO2 SERPL-SCNC: 31 MMOL/L (ref 21–32)
CREAT SERPL-MCNC: 1.04 MG/DL (ref 0.6–1.3)
EOSINOPHIL # BLD AUTO: 0.1 THOUSAND/ÂΜL (ref 0–0.61)
EOSINOPHIL NFR BLD AUTO: 2 % (ref 0–6)
ERYTHROCYTE [DISTWIDTH] IN BLOOD BY AUTOMATED COUNT: 12 % (ref 11.6–15.1)
GFR SERPL CREATININE-BSD FRML MDRD: 84 ML/MIN/1.73SQ M
GLUCOSE P FAST SERPL-MCNC: 93 MG/DL (ref 65–99)
HCT VFR BLD AUTO: 44.9 % (ref 36.5–49.3)
HDLC SERPL-MCNC: 46 MG/DL
HGB BLD-MCNC: 14 G/DL (ref 12–17)
IMM GRANULOCYTES # BLD AUTO: 0.03 THOUSAND/UL (ref 0–0.2)
IMM GRANULOCYTES NFR BLD AUTO: 1 % (ref 0–2)
LDLC SERPL CALC-MCNC: 121 MG/DL (ref 0–100)
LYMPHOCYTES # BLD AUTO: 1.55 THOUSANDS/ÂΜL (ref 0.6–4.47)
LYMPHOCYTES NFR BLD AUTO: 26 % (ref 14–44)
MCH RBC QN AUTO: 27.5 PG (ref 26.8–34.3)
MCHC RBC AUTO-ENTMCNC: 31.2 G/DL (ref 31.4–37.4)
MCV RBC AUTO: 88 FL (ref 82–98)
MONOCYTES # BLD AUTO: 0.58 THOUSAND/ÂΜL (ref 0.17–1.22)
MONOCYTES NFR BLD AUTO: 10 % (ref 4–12)
NEUTROPHILS # BLD AUTO: 3.77 THOUSANDS/ÂΜL (ref 1.85–7.62)
NEUTS SEG NFR BLD AUTO: 61 % (ref 43–75)
NONHDLC SERPL-MCNC: 138 MG/DL
NRBC BLD AUTO-RTO: 0 /100 WBCS
PLATELET # BLD AUTO: 320 THOUSANDS/UL (ref 149–390)
PMV BLD AUTO: 9.7 FL (ref 8.9–12.7)
POTASSIUM SERPL-SCNC: 4.2 MMOL/L (ref 3.5–5.3)
PROT SERPL-MCNC: 8.2 G/DL (ref 6.4–8.4)
RBC # BLD AUTO: 5.1 MILLION/UL (ref 3.88–5.62)
SODIUM SERPL-SCNC: 139 MMOL/L (ref 135–147)
TRIGL SERPL-MCNC: 84 MG/DL
TSH SERPL DL<=0.05 MIU/L-ACNC: 2.03 UIU/ML (ref 0.45–4.5)
WBC # BLD AUTO: 6.05 THOUSAND/UL (ref 4.31–10.16)

## 2023-04-04 LAB
HCV AB S/CO SERPL IA: NON REACTIVE
HIV 1+2 AB+HIV1 P24 AG SERPL QL IA: NORMAL
HIV 2 AB SERPL QL IA: NORMAL
HIV1 AB SERPL QL IA: NORMAL
HIV1 P24 AG SERPL QL IA: NORMAL
SL AMB INTERPRETATION: NORMAL

## 2023-04-19 ENCOUNTER — TELEPHONE (OUTPATIENT)
Dept: INTERNAL MEDICINE CLINIC | Facility: CLINIC | Age: 48
End: 2023-04-19

## 2023-04-19 NOTE — TELEPHONE ENCOUNTER
Patient called to get the status of his blood work done on 4/03/23and to have the Provider or Clinical team explain the numbers and results.     Please reach out to patient to discuss lab results at [phone]

## 2023-04-26 ENCOUNTER — TELEPHONE (OUTPATIENT)
Dept: PAIN MEDICINE | Facility: CLINIC | Age: 48
End: 2023-04-26

## 2023-08-25 DIAGNOSIS — M79.18 MYOFASCIAL PAIN SYNDROME: ICD-10-CM

## 2023-08-25 NOTE — TELEPHONE ENCOUNTER
S/w pharmacy staff, pt has refill of tizanidine on file, pharmacy to refill and call pt when it is ready for pickup.     [address] can you please "done" this task, TY.

## 2023-08-28 RX ORDER — TIZANIDINE 4 MG/1
4 TABLET ORAL EVERY 8 HOURS PRN
Qty: 90 TABLET | Refills: 1 | OUTPATIENT
Start: 2023-08-28

## 2023-09-06 ENCOUNTER — TELEPHONE (OUTPATIENT)
Dept: GASTROENTEROLOGY | Facility: CLINIC | Age: 48
End: 2023-09-06

## 2023-09-20 ENCOUNTER — HOSPITAL ENCOUNTER (OUTPATIENT)
Dept: GASTROENTEROLOGY | Facility: HOSPITAL | Age: 48
Setting detail: OUTPATIENT SURGERY
Discharge: HOME | End: 2023-09-20
Attending: INTERNAL MEDICINE
Payer: MEDICARE

## 2023-09-20 ENCOUNTER — ANESTHESIA (OUTPATIENT)
Dept: GASTROENTEROLOGY | Facility: HOSPITAL | Age: 48
End: 2023-09-20

## 2023-09-20 ENCOUNTER — ANESTHESIA EVENT (OUTPATIENT)
Dept: GASTROENTEROLOGY | Facility: HOSPITAL | Age: 48
End: 2023-09-20

## 2023-09-20 VITALS
OXYGEN SATURATION: 99 % | RESPIRATION RATE: 18 BRPM | SYSTOLIC BLOOD PRESSURE: 113 MMHG | WEIGHT: 130 LBS | DIASTOLIC BLOOD PRESSURE: 68 MMHG | HEIGHT: 63 IN | TEMPERATURE: 96.2 F | HEART RATE: 58 BPM | BODY MASS INDEX: 23.04 KG/M2

## 2023-09-20 DIAGNOSIS — Z12.11 SCREENING FOR COLON CANCER: ICD-10-CM

## 2023-09-20 PROCEDURE — 88305 TISSUE EXAM BY PATHOLOGIST: CPT | Performed by: STUDENT IN AN ORGANIZED HEALTH CARE EDUCATION/TRAINING PROGRAM

## 2023-09-20 RX ORDER — LIDOCAINE HYDROCHLORIDE 10 MG/ML
INJECTION, SOLUTION EPIDURAL; INFILTRATION; INTRACAUDAL; PERINEURAL AS NEEDED
Status: DISCONTINUED | OUTPATIENT
Start: 2023-09-20 | End: 2023-09-20

## 2023-09-20 RX ORDER — PROPOFOL 10 MG/ML
INJECTION, EMULSION INTRAVENOUS AS NEEDED
Status: DISCONTINUED | OUTPATIENT
Start: 2023-09-20 | End: 2023-09-20

## 2023-09-20 RX ORDER — SODIUM CHLORIDE, SODIUM LACTATE, POTASSIUM CHLORIDE, CALCIUM CHLORIDE 600; 310; 30; 20 MG/100ML; MG/100ML; MG/100ML; MG/100ML
INJECTION, SOLUTION INTRAVENOUS CONTINUOUS PRN
Status: DISCONTINUED | OUTPATIENT
Start: 2023-09-20 | End: 2023-09-20

## 2023-09-20 RX ADMIN — PROPOFOL 40 MG: 10 INJECTION, EMULSION INTRAVENOUS at 08:11

## 2023-09-20 RX ADMIN — PROPOFOL 20 MG: 10 INJECTION, EMULSION INTRAVENOUS at 07:58

## 2023-09-20 RX ADMIN — PROPOFOL 20 MG: 10 INJECTION, EMULSION INTRAVENOUS at 08:07

## 2023-09-20 RX ADMIN — PROPOFOL 20 MG: 10 INJECTION, EMULSION INTRAVENOUS at 08:00

## 2023-09-20 RX ADMIN — Medication 4 MG: at 08:03

## 2023-09-20 RX ADMIN — SODIUM CHLORIDE, SODIUM LACTATE, POTASSIUM CHLORIDE, AND CALCIUM CHLORIDE: .6; .31; .03; .02 INJECTION, SOLUTION INTRAVENOUS at 07:22

## 2023-09-20 RX ADMIN — PROPOFOL 30 MG: 10 INJECTION, EMULSION INTRAVENOUS at 08:14

## 2023-09-20 RX ADMIN — LIDOCAINE HYDROCHLORIDE 50 MG: 10 INJECTION, SOLUTION EPIDURAL; INFILTRATION; INTRACAUDAL; PERINEURAL at 07:57

## 2023-09-20 RX ADMIN — PROPOFOL 30 MG: 10 INJECTION, EMULSION INTRAVENOUS at 08:03

## 2023-09-20 RX ADMIN — PROPOFOL 100 MG: 10 INJECTION, EMULSION INTRAVENOUS at 07:57

## 2023-09-20 NOTE — H&P
History and Physical - [facility]  Rene Miller 51 y.o. male MRN: [id number]                  HPI: Rene Miller is a 51y.o. year old male who presents for CRC screening      REVIEW OF SYSTEMS: Per the HPI, and otherwise unremarkable. Historical Information   Past Medical History:   Diagnosis Date   • Anxiety    • Bipolar disorder ([address])    • Chronic back pain     with left sided sciatica   • Constipation    • Fibromyalgia, primary    • Hemorrhoids     last assessed - 09Aug2012   • Hyperlipidemia      Past Surgical History:   Procedure Laterality Date   • CERVICAL FUSION      Cervical Vertbral fusion   • HI EXC VARICOCELE/LIGATION SPERMATIC VEINS SPX Left 8/27/2019    Procedure: VARICOCELECTOMY;  Surgeon: Faith Carlos MD;  [facility];  Service: Urology   • HI TRURL ELECTROSURG [address] N/A 11/17/2020    Procedure: TRANSURETHRAL VAPORIZATION OF PROSTATE (TURP); Surgeon: Faith Carlos MD;  [facility];  Service: Urology     Social History   Social History     Substance and Sexual Activity   Alcohol Use No     Social History     Substance and Sexual Activity   Drug Use No     Social History     Tobacco Use   Smoking Status Never   Smokeless Tobacco Never     Family History   Problem Relation Age of Onset   • Arthritis Mother    • Hyperlipidemia Mother    • Hyperthyroidism Father    • Other Father         Prostate disease   • Colon cancer Sister 40   • Other Sister         Epilepsy   • Asthma Brother        Meds/Allergies     (Not in a hospital admission)      No Known Allergies    Objective     Blood pressure 165/95, pulse 66, temperature (!) 97.3 °F (36.3 °C), temperature source Tympanic, resp. rate 16, height 5' 3" (1.6 m), weight 59 kg (130 lb), SpO2 98 %. PHYSICAL EXAMINATION:    General Appearance:   Alert, cooperative, no distress   HEENT:  Normocephalic, atraumatic, anicteric.  Neck supple, symmetrical, trachea midline. Lungs:   Equal chest rise and unlabored breathing, normal effort, no coughing. Cardiovascular:   No visualized JVD. Abdomen:   No abdominal distension. Skin:   No jaundice, rashes, or lesions. Musculoskeletal:   Normal range of motion visualized. Psych:  Normal affect and normal insight. Neuro:  Alert and appropriate. ASSESSMENT/PLAN:  This is a 51y.o. year old male here for colonoscopy, and he is stable and optimized for his procedure.

## 2023-09-20 NOTE — ANESTHESIA POSTPROCEDURE EVALUATION
Post-Op Assessment Note    CV Status:  Stable  Pain Score: 0    Pain management: adequate     Mental Status:  Alert and awake   Hydration Status:  Euvolemic   PONV Controlled:  Controlled   Airway Patency:  Patent      Post Op Vitals Reviewed: Yes      Staff: CRNA         No notable events documented.     BP   90/52   Temp  96.2   Pulse  52   Resp   8   SpO2   99

## 2023-09-20 NOTE — ANESTHESIA PREPROCEDURE EVALUATION
Procedure:  COLONOSCOPY    Relevant Problems   CARDIO   (+) Pure hypercholesterolemia      /RENAL   (+) Benign localized prostatic hyperplasia with lower urinary tract symptoms (LUTS)      MUSCULOSKELETAL   (+) Chronic bilateral low back pain with left-sided sciatica   (+) Fibromyalgia      NEURO/PSYCH   (+) Chronic bilateral low back pain with left-sided sciatica   (+) Chronic neck pain   (+) Chronic pain of both shoulders   (+) Depression with anxiety   (+) Fibromyalgia        Physical Exam    Airway    Mallampati score: II  TM Distance: >3 FB  Neck ROM: full     Dental       Cardiovascular  Rhythm: regular, Rate: normal,     Pulmonary  Breath sounds clear to auscultation,     Other Findings        Anesthesia Plan  ASA Score- 2     Anesthesia Type- IV sedation with anesthesia with ASA Monitors. Additional Monitors:   Airway Plan:           Plan Factors-    Chart reviewed. Patient summary reviewed. Patient is not a current smoker. Induction- intravenous. Postoperative Plan-     Informed Consent- Anesthetic plan and risks discussed with patient. I personally reviewed this patient with the CRNA. Discussed and agreed on the Anesthesia Plan with the CRNA. Ignacio Reid

## 2023-09-25 PROCEDURE — 88305 TISSUE EXAM BY PATHOLOGIST: CPT | Performed by: STUDENT IN AN ORGANIZED HEALTH CARE EDUCATION/TRAINING PROGRAM

## 2023-09-26 ENCOUNTER — TELEPHONE (OUTPATIENT)
Dept: GASTROENTEROLOGY | Facility: CLINIC | Age: 48
End: 2023-09-26

## 2023-10-12 ENCOUNTER — NURSE TRIAGE (OUTPATIENT)
Age: 48
End: 2023-10-12

## 2023-10-12 NOTE — TELEPHONE ENCOUNTER
Patient calling in states that he is having constipation again. Patient encouraged to get miralax and take 1 capful twice a day. Patient verbalized understanding and will call back if no results after 2-3 days.

## 2023-10-12 NOTE — TELEPHONE ENCOUNTER
----- Message from Anushka Nixon sent at 10/12/2023  1:02 PM EDT -----  Patient called in stating that he has not had a bowel movement since his colonoscopy   On 9/20/23.

## 2023-11-13 ENCOUNTER — OFFICE VISIT (OUTPATIENT)
Dept: INTERNAL MEDICINE CLINIC | Facility: CLINIC | Age: 48
End: 2023-11-13

## 2023-11-13 VITALS
BODY MASS INDEX: 24.1 KG/M2 | TEMPERATURE: 98.1 F | SYSTOLIC BLOOD PRESSURE: 142 MMHG | WEIGHT: 136 LBS | HEART RATE: 71 BPM | DIASTOLIC BLOOD PRESSURE: 90 MMHG | HEIGHT: 63 IN

## 2023-11-13 DIAGNOSIS — K59.00 CONSTIPATION, UNSPECIFIED CONSTIPATION TYPE: Primary | ICD-10-CM

## 2023-11-13 DIAGNOSIS — F31.30 BIPOLAR AFFECTIVE DISORDER, CURRENT EPISODE DEPRESSED, CURRENT EPISODE SEVERITY UNSPECIFIED (HCC): ICD-10-CM

## 2023-11-13 DIAGNOSIS — Z59.89 INSURANCE COVERAGE PROBLEMS: ICD-10-CM

## 2023-11-13 PROCEDURE — 99213 OFFICE O/P EST LOW 20 MIN: CPT | Performed by: INTERNAL MEDICINE

## 2023-11-13 RX ORDER — LAMOTRIGINE 25 MG/1
50 TABLET ORAL DAILY
Qty: 60 TABLET | Refills: 2 | Status: SHIPPED | OUTPATIENT
Start: 2023-11-13 | End: 2024-02-11

## 2023-11-13 RX ORDER — POLYETHYLENE GLYCOL 3350 17 G/17G
17 POWDER, FOR SOLUTION ORAL DAILY
Qty: 510 G | Refills: 0 | Status: SHIPPED | OUTPATIENT
Start: 2023-11-13 | End: 2023-12-13

## 2023-11-13 RX ORDER — QUETIAPINE FUMARATE 300 MG/1
300 TABLET, FILM COATED ORAL
Qty: 90 TABLET | Refills: 0 | Status: SHIPPED | OUTPATIENT
Start: 2023-11-13 | End: 2024-02-11

## 2023-11-13 SDOH — ECONOMIC STABILITY - INCOME SECURITY: OTHER PROBLEMS RELATED TO HOUSING AND ECONOMIC CIRCUMSTANCES: Z59.89

## 2023-11-13 NOTE — PROGRESS NOTES
[address] Visit Note  Rene Miller 51 y.o. male   MRN: [id number]    Assessment and Plan      1. Constipation, unspecified constipation type: Patient reports chronic constipation for many years. Moves bowels every 3 days, associated with abdominal discomfort that is relieved on defecation. He describes significant straining and [place] 1 stools. He had normal thyroid function checked in March '23. Recent colonoscopy 9/20/23 showed 2 subcentimeter polyps in the cecum (tubular adenoma on biopsy) and hemorrhoids (recommended repeat in 7 years). He does report daily stress and having issues with insurance coverage (see below). I suspect he has IBS-constipation type. Start MiraLax daily   Add Metamucil   Increase water intake and exercise   If no improvement in symptoms, consider referral to GI and patient may benefit from secretagogue   Follow-up in 3 months   -     psyllium (METAMUCIL SMOOTH TEXTURE) 28 % packet; Take 1 packet by mouth 2 (two) times a day  -     polyethylene glycol (MIRALAX) 17 g packet; Take 17 g by mouth daily    2. Bipolar affective disorder, current episode depressed, current episode severity unspecified (HCC)  -     QUEtiapine (SEROquel) 300 mg tablet; Take 1 tablet (300 mg total) by mouth daily at bedtime  -     lamoTRIgine (LaMICtal) 25 mg tablet; Take 2 tablets (50 mg total) by mouth in the morning    3. Insurance coverage problems  -     Ambulatory Referral to Financial Counseling Program; Future      Follow up in our clinic in 3 month(s) for  constipation . Subjective   HISTORY OF PRESENT ILLNESS:  Rene Miller is a 51 y.o. male with a past medical history of BPD, fibromyalgia, cervical and lumbar radiculopathy s/p ACDF C5-6 following with pain medicine, BPH s/p TURP, bipolar who presents to clinic today for constipation. Patient reports constipation for many years. Persistent after recent colonoscopy.  He moves his bowels every 3 days. He describes [place] stool 1 and straining. Does report blood on toilet paper that he relates to his hemorrhoids (visualized on colonoscopy). Reports associated abdominal discomfort. Denies weight loss, nausea, vomiting or other GI complaints. Of note, his abdominal pain is relieved with defecation and he does report daily stressors. He has been having issues with insurance, he says this will be running out soon. He requests to speak with someone in the clinic to help with insurance. Review of Systems - Negative other than stated above    Objective     Vitals:    11/13/23 1254   BP: 142/90   BP Location: Right arm   Patient Position: Sitting   Cuff Size: Adult   Pulse: 71   Temp: 98.1 °F (36.7 °C)   TempSrc: Temporal   Weight: 61.7 kg (136 lb)   Height: 5' 3" (1.6 m)       Physical Exam:  General: No apparent distress, resting comfortably   Head: Normocephalic, atraumatic  Eyes: Anicteric, no conjunctival erythema  ENT: External ear normal, no nasal discharge  Neck: Trachea midline, no visible lymphadenopathy or goiter  Respiratory: Clear to auscultation. Non-labored respirations, symmetric thorax expansion  Cardiovascular: Regular rate and rhythm.  Extremities appear well-perfused  Abdomen: Non-distended  Extremities: Moves extremities spontaneously, no peripheral edema  Skin: No visible rashes, wounds, or jaundice  Neuro: A&O x 3, no gross focal deficits, no aphasia    History     Current Outpatient Medications:     lamoTRIgine (LaMICtal) 25 mg tablet, Take 2 tablets (50 mg total) by mouth in the morning, Disp: 60 tablet, Rfl: 2    polyethylene glycol (MIRALAX) 17 g packet, Take 17 g by mouth daily, Disp: 510 g, Rfl: 0    psyllium (METAMUCIL SMOOTH TEXTURE) 28 % packet, Take 1 packet by mouth 2 (two) times a day, Disp: 180 packet, Rfl: 0    QUEtiapine (SEROquel) 300 mg tablet, Take 1 tablet (300 mg total) by mouth daily at bedtime, Disp: 90 tablet, Rfl: 0    acetaminophen (Tylenol) 325 mg tablet, Take 650 mg by mouth every 6 (six) hours as needed for mild pain, Disp: , Rfl:     tiZANidine (ZANAFLEX) 4 mg tablet, Take 1 tablet (4 mg total) by mouth every 8 (eight) hours as needed for muscle spasms, Disp: 90 tablet, Rfl: 1  No Known Allergies   Past Medical History:   Diagnosis Date    Anxiety     Bipolar disorder ([address])     Chronic back pain     with left sided sciatica    Constipation     Fibromyalgia, primary     Hemorrhoids     last assessed - 09Aug2012    Hyperlipidemia      Past Surgical History:   Procedure Laterality Date    CERVICAL FUSION      Cervical Vertbral fusion    AR EXC VARICOCELE/LIGATION SPERMATIC VEINS SPX Left 8/27/2019    Procedure: VARICOCELECTOMY;  Surgeon: Brian Robles MD;  [facility];  Service: Urology    STEVE TRENA ELECTROSURG [address] N/A 11/17/2020    Procedure: TRANSURETHRAL VAPORIZATION OF PROSTATE (TURP);   Surgeon: Brian Robles MD;  [facility];  Service: Urology     Social History     Socioeconomic History    Marital status: /Civil Union     Spouse name: Not on file    Number of children: Not on file    Years of education: Not on file    Highest education level: Not on file   Occupational History    Occupation: Disabled   Tobacco Use    Smoking status: Never    Smokeless tobacco: Never   Vaping Use    Vaping Use: Never used   Substance and Sexual Activity    Alcohol use: No    Drug use: No    Sexual activity: Yes   Other Topics Concern    Not on file   Social History Narrative    Not on file     Social Determinants of Health     Financial Resource Strain: Low Risk  (3/27/2023)    Overall Financial Resource Strain (CARDIA)     Difficulty of Paying Living Expenses: Not very hard   Food Insecurity: No Food Insecurity (3/27/2023)    Hunger Vital Sign     Worried About Running Out of Food in the Last Year: Never true     Ran Out of Food in the Last Year: Never true   Transportation Needs: No Transportation Needs (3/27/2023)    PRAPARE - Transportation     Lack of Transportation (Medical): No     Lack of Transportation (Non-Medical): No   Physical Activity: Not on file   Stress: Not on file   Social Connections: Not on file   Intimate Partner Violence: Not on file   Housing Stability: Low Risk  (5/16/2022)    Housing Stability Vital Sign     Unable to Pay for Housing in the Last Year: No     Number of Places Lived in the Last Year: 1     Unstable Housing in the Last Year: No     Family History   Problem Relation Age of Onset    Arthritis Mother     Hyperlipidemia Mother     Hyperthyroidism Father     Other Father         Prostate disease    Colon cancer Sister 40    Other Sister         Epilepsy    Asthma Brother        Kera Ho DO  [facility] Internal [facility] PGY-3  [address] [address] [address] [facility], [address]    PLEASE NOTE:  This encounter was completed utilizing the [employer] Direct Speech Voice Recognition Software. Grammatical errors, random word insertions, pronoun errors and incomplete sentences are occasional consequences of the system due to software limitations, ambient noise and hardware issues. These may be missed by proof reading prior to affixing electronic signature. Any questions or concerns about the content, text or information contained within the body of this dictation should be directly addressed to the physician for clarification. Please do not hesitate to call me directly if you have any any questions or concerns.

## 2023-11-16 ENCOUNTER — PATIENT OUTREACH (OUTPATIENT)
Dept: INTERNAL MEDICINE CLINIC | Facility: CLINIC | Age: 48
End: 2023-11-16

## 2023-11-16 DIAGNOSIS — Z78.9 NEEDS ASSISTANCE WITH COMMUNITY RESOURCES: Primary | ICD-10-CM

## 2023-11-16 NOTE — PROGRESS NOTES
CECILIA has met with this pt and his wife. Pt is bilingual as well as his  wife   Pt shares he had been going to [facility] for his [address] but they informed him last moth they will no longer take his [employer]. They has been trying to find another providers without success. Cecilia did provide pt a list of possible Providers and with his permission SW assisted with a call To [facility]  [phone] and spoke to Enma. They were able to offer pt an INTAKE appointment for Monday 12/4/23 @ 3 PM.  Pt to arrive @ 2:45 PM to do paper work. Parking is available. Pt is satisfied with same. In addition SW has indicated he maybe able to get an appointment at [facility] in [place] ([place]). [place] or [facility] [facility] [employer]. Pt has their 3s in case he wants to explore them in the future. Pt share hs is on SSD . His wife works full time at [employer]. Pt drives and is independent in his ADLs. He shares he has a Medicare D plan and can afford his medications. Pt denies other needs at this time. Patient does not have any further questions, concerns, or other needs at this time. Patient has my contact # and PCP office # if needed. Social Work to remain available to assist as indicated. Please re-consult Social Work if needed.

## 2024-01-08 ENCOUNTER — TRANSCRIBE ORDERS (OUTPATIENT)
Dept: LAB | Facility: CLINIC | Age: 49
End: 2024-01-08

## 2024-01-08 ENCOUNTER — APPOINTMENT (OUTPATIENT)
Dept: LAB | Facility: CLINIC | Age: 49
End: 2024-01-08
Payer: MEDICARE

## 2024-01-08 DIAGNOSIS — F41.8 CASTRATION COMPLEX: ICD-10-CM

## 2024-01-08 DIAGNOSIS — R69 DISEASE OF PRESUMED INFECTIOUS ORIGIN: Primary | ICD-10-CM

## 2024-01-08 DIAGNOSIS — F31.89 MANIC DISORDER, RECURRENT EPISODE, MILD (HCC): ICD-10-CM

## 2024-01-08 DIAGNOSIS — Z79.899 ENCOUNTER FOR LONG-TERM (CURRENT) USE OF OTHER MEDICATIONS: ICD-10-CM

## 2024-01-08 DIAGNOSIS — R69 DISEASE OF PRESUMED INFECTIOUS ORIGIN: ICD-10-CM

## 2024-01-08 LAB
25(OH)D3 SERPL-MCNC: 33.9 NG/ML (ref 30–100)
ALBUMIN SERPL BCP-MCNC: 4.5 G/DL (ref 3.5–5)
ALP SERPL-CCNC: 47 U/L (ref 34–104)
ALT SERPL W P-5'-P-CCNC: 29 U/L (ref 7–52)
ANION GAP SERPL CALCULATED.3IONS-SCNC: 9 MMOL/L
AST SERPL W P-5'-P-CCNC: 28 U/L (ref 13–39)
BASOPHILS # BLD AUTO: 0.02 THOUSANDS/ÂΜL (ref 0–0.1)
BASOPHILS NFR BLD AUTO: 0 % (ref 0–1)
BILIRUB SERPL-MCNC: 0.79 MG/DL (ref 0.2–1)
BUN SERPL-MCNC: 21 MG/DL (ref 5–25)
CALCIUM SERPL-MCNC: 9.9 MG/DL (ref 8.4–10.2)
CHLORIDE SERPL-SCNC: 103 MMOL/L (ref 96–108)
CHOLEST SERPL-MCNC: 197 MG/DL
CO2 SERPL-SCNC: 31 MMOL/L (ref 21–32)
CREAT SERPL-MCNC: 1.1 MG/DL (ref 0.6–1.3)
EOSINOPHIL # BLD AUTO: 0.1 THOUSAND/ÂΜL (ref 0–0.61)
EOSINOPHIL NFR BLD AUTO: 2 % (ref 0–6)
ERYTHROCYTE [DISTWIDTH] IN BLOOD BY AUTOMATED COUNT: 12 % (ref 11.6–15.1)
EST. AVERAGE GLUCOSE BLD GHB EST-MCNC: 114 MG/DL
FOLATE SERPL-MCNC: 20.9 NG/ML
GFR SERPL CREATININE-BSD FRML MDRD: 78 ML/MIN/1.73SQ M
GLUCOSE P FAST SERPL-MCNC: 97 MG/DL (ref 65–99)
HBA1C MFR BLD: 5.6 %
HCT VFR BLD AUTO: 46.4 % (ref 36.5–49.3)
HDLC SERPL-MCNC: 48 MG/DL
HGB BLD-MCNC: 15.2 G/DL (ref 12–17)
IMM GRANULOCYTES # BLD AUTO: 0.01 THOUSAND/UL (ref 0–0.2)
IMM GRANULOCYTES NFR BLD AUTO: 0 % (ref 0–2)
LDLC SERPL CALC-MCNC: 127 MG/DL (ref 0–100)
LYMPHOCYTES # BLD AUTO: 1.93 THOUSANDS/ÂΜL (ref 0.6–4.47)
LYMPHOCYTES NFR BLD AUTO: 44 % (ref 14–44)
MCH RBC QN AUTO: 28.1 PG (ref 26.8–34.3)
MCHC RBC AUTO-ENTMCNC: 32.8 G/DL (ref 31.4–37.4)
MCV RBC AUTO: 86 FL (ref 82–98)
MONOCYTES # BLD AUTO: 0.55 THOUSAND/ÂΜL (ref 0.17–1.22)
MONOCYTES NFR BLD AUTO: 12 % (ref 4–12)
NEUTROPHILS # BLD AUTO: 1.86 THOUSANDS/ÂΜL (ref 1.85–7.62)
NEUTS SEG NFR BLD AUTO: 42 % (ref 43–75)
NONHDLC SERPL-MCNC: 149 MG/DL
NRBC BLD AUTO-RTO: 0 /100 WBCS
PLATELET # BLD AUTO: 305 THOUSANDS/UL (ref 149–390)
PMV BLD AUTO: 9.8 FL (ref 8.9–12.7)
POTASSIUM SERPL-SCNC: 4.1 MMOL/L (ref 3.5–5.3)
PROT SERPL-MCNC: 7.7 G/DL (ref 6.4–8.4)
RBC # BLD AUTO: 5.41 MILLION/UL (ref 3.88–5.62)
SODIUM SERPL-SCNC: 143 MMOL/L (ref 135–147)
T3 SERPL-MCNC: 1.2 NG/ML
TRIGL SERPL-MCNC: 112 MG/DL
TSH SERPL DL<=0.05 MIU/L-ACNC: 3.29 UIU/ML (ref 0.45–4.5)
VIT B12 SERPL-MCNC: 85 PG/ML (ref 180–914)
WBC # BLD AUTO: 4.47 THOUSAND/UL (ref 4.31–10.16)

## 2024-01-08 PROCEDURE — 82746 ASSAY OF FOLIC ACID SERUM: CPT

## 2024-01-08 PROCEDURE — 82306 VITAMIN D 25 HYDROXY: CPT

## 2024-01-08 PROCEDURE — 82607 VITAMIN B-12: CPT

## 2024-01-08 PROCEDURE — 80061 LIPID PANEL: CPT

## 2024-01-08 PROCEDURE — 84443 ASSAY THYROID STIM HORMONE: CPT

## 2024-01-08 PROCEDURE — 80053 COMPREHEN METABOLIC PANEL: CPT

## 2024-01-08 PROCEDURE — 85025 COMPLETE CBC W/AUTO DIFF WBC: CPT

## 2024-01-08 PROCEDURE — 83036 HEMOGLOBIN GLYCOSYLATED A1C: CPT

## 2024-01-08 PROCEDURE — 84480 ASSAY TRIIODOTHYRONINE (T3): CPT

## 2024-01-08 PROCEDURE — 36415 COLL VENOUS BLD VENIPUNCTURE: CPT

## 2024-01-09 NOTE — PROGRESS NOTES
Assessment:  1. Myofascial pain syndrome    2. Bilateral hand pain    3. Chronic neck pain    4. Fibromyalgia    5. Chronic pain of both shoulders        Plan:  Patient will be scheduled for trigger point injections into the cervical paraspinal and trapezii musculature to address the myofascial component of the patient's pain  Patient may continue tizanidine as prescribed.  This medication was refilled today  Discussed a referral to chiropractic therapy however patient declines  Patient was given a referral to orthopedics for bilateral thumb pain.  I did advise he trial topical Voltaren gel to the thumbs  Follow-up pending results of trigger point injections or sooner if needed    History of Present Illness:    The patient is a 48 y.o. male with a history of C5-6 fusion, fibromyalgia and bipolar disorder last seen on 3/31/2023 who presents for a follow up office visit in regards to chronic neck pain that radiates into the bilateral trapezii musculature and left lower extremity numbness and paresthesias.  He denies bowel or bladder incontinence, balance issues or saddle anesthesia.  Patient did have trigger point injections into the cervical and trapezii musculature in April 2023 which she states provided greater than 50% relief for about 3 to 4 months.  He uses tizanidine 4 mg as needed with some relief.  EMG of the bilateral upper and left lower extremity was normal.  MRI of the cervical spine shows a stable ACDF at C5-6 otherwise unremarkable.  MRI of the lumbar spine shows mild facet hypertrophy at L4-5 without any central or foraminal stenosis.  Every other level is normal.    Patient also has a secondary complaint of bilateral thumb pain    The patient rates his pain a 9 out of 10 on the numeric pain rating scale.  His pain is occasional in the morning and at night and it is described as sharp, cramping, numbness and pins-and-needles    Current pain medications includes:  tizanidine 4 mg every 8 hours as needed  and Tylenol as needed.     I have personally reviewed and/or updated the patient's past medical history, past surgical history, family history, social history, current medications, allergies, and vital signs today.       Review of Systems:    Review of Systems   Respiratory:  Negative for shortness of breath.    Cardiovascular:  Negative for chest pain.   Gastrointestinal:  Negative for constipation, diarrhea, nausea and vomiting.   Musculoskeletal:  Positive for back pain and gait problem. Negative for arthralgias, joint swelling and myalgias.   Skin:  Negative for rash.   Neurological:  Negative for dizziness, seizures and weakness.   All other systems reviewed and are negative.        Past Medical History:   Diagnosis Date    Anxiety     Bipolar disorder (HCC)     Chronic back pain     with left sided sciatica    Constipation     Fibromyalgia, primary     Hemorrhoids     last assessed - [id number]    Hyperlipidemia        Past Surgical History:   Procedure Laterality Date    CERVICAL FUSION      Cervical Vertbral fusion    [place] EXC VARICOCELE/LIGATION SPERMATIC VEINS SPX Left 8/27/2019    Procedure: VARICOCELECTOMY;  Surgeon: Brayan Grady MD;  [facility];  Service: Urology    [place] TRURL ELECTROSURG RESCJ PROSTATE BLEED COMPLETE N/A 11/17/2020    Procedure: TRANSURETHRAL VAPORIZATION OF PROSTATE (TURP);  Surgeon: Brayan Grady MD;  [facility];  Service: Urology       Family History   Problem Relation Age of Onset    Arthritis Mother     Hyperlipidemia Mother     Hyperthyroidism Father     Other Father         Prostate disease    Colon cancer Sister 44    Other Sister         Epilepsy    Asthma Brother        Social History     Occupational History    Occupation: Disabled   Tobacco Use    Smoking status: Never    Smokeless tobacco: Never   Vaping Use    Vaping status: Never Used   Substance and Sexual Activity    Alcohol use: No    Drug use: No    Sexual activity: Yes         Current  "Outpatient Medications:     acetaminophen (Tylenol) 325 mg tablet, Take 650 mg by mouth every 6 (six) hours as needed for mild pain, Disp: , Rfl:     lamoTRIgine (LaMICtal) 25 mg tablet, Take 2 tablets (50 mg total) by mouth in the morning, Disp: 60 tablet, Rfl: 2    QUEtiapine (SEROquel) 300 mg tablet, Take 1 tablet (300 mg total) by mouth daily at bedtime, Disp: 90 tablet, Rfl: 0    tiZANidine (ZANAFLEX) 4 mg tablet, Take 1 tablet (4 mg total) by mouth every 8 (eight) hours as needed for muscle spasms, Disp: 90 tablet, Rfl: 2    polyethylene glycol (MIRALAX) 17 g packet, Take 17 g by mouth daily, Disp: 510 g, Rfl: 0    psyllium (METAMUCIL SMOOTH TEXTURE) 28 % packet, Take 1 packet by mouth 2 (two) times a day, Disp: 180 packet, Rfl: 0    No Known Allergies    Physical Exam:    /73   Pulse 80   Ht 5' 3\" (1.6 m)   Wt 64.9 kg (143 lb)   BMI 25.33 kg/m²     Constitutional:normal, well developed, well nourished, alert, in no distress and non-toxic and no overt pain behavior.  Eyes:anicteric  HEENT:grossly intact  Neck:supple, symmetric, trachea midline and no masses   Pulmonary:even and unlabored  Cardiovascular:No edema or pitting edema present  Skin:Normal without rashes or lesions and well hydrated  Psychiatric:Mood and affect appropriate  Neurologic:Cranial Nerves II-XII grossly intact  Musculoskeletal:normal gait.  Bilateral cervical and trapezius musculature tender to palpation.  Full range of motion of the cervical spine      Imaging  No orders to display         Orders Placed This Encounter   Procedures    Ambulatory referral to Orthopedic Surgery       "

## 2024-01-10 ENCOUNTER — OFFICE VISIT (OUTPATIENT)
Dept: PAIN MEDICINE | Facility: CLINIC | Age: 49
End: 2024-01-10
Payer: MEDICARE

## 2024-01-10 VITALS
BODY MASS INDEX: 25.34 KG/M2 | WEIGHT: 143 LBS | DIASTOLIC BLOOD PRESSURE: 73 MMHG | SYSTOLIC BLOOD PRESSURE: 116 MMHG | HEART RATE: 80 BPM | HEIGHT: 63 IN

## 2024-01-10 DIAGNOSIS — M25.512 CHRONIC PAIN OF BOTH SHOULDERS: ICD-10-CM

## 2024-01-10 DIAGNOSIS — G89.29 CHRONIC PAIN OF BOTH SHOULDERS: ICD-10-CM

## 2024-01-10 DIAGNOSIS — G89.29 CHRONIC NECK PAIN: ICD-10-CM

## 2024-01-10 DIAGNOSIS — M79.641 BILATERAL HAND PAIN: ICD-10-CM

## 2024-01-10 DIAGNOSIS — M54.2 CHRONIC NECK PAIN: ICD-10-CM

## 2024-01-10 DIAGNOSIS — M79.642 BILATERAL HAND PAIN: ICD-10-CM

## 2024-01-10 DIAGNOSIS — M25.511 CHRONIC PAIN OF BOTH SHOULDERS: ICD-10-CM

## 2024-01-10 DIAGNOSIS — M79.18 MYOFASCIAL PAIN SYNDROME: Primary | ICD-10-CM

## 2024-01-10 DIAGNOSIS — M79.7 FIBROMYALGIA: ICD-10-CM

## 2024-01-10 PROCEDURE — 99214 OFFICE O/P EST MOD 30 MIN: CPT | Performed by: NURSE PRACTITIONER

## 2024-01-10 RX ORDER — TIZANIDINE 4 MG/1
4 TABLET ORAL EVERY 8 HOURS PRN
Qty: 90 TABLET | Refills: 2 | Status: SHIPPED | OUTPATIENT
Start: 2024-01-10

## 2024-01-19 ENCOUNTER — PROCEDURE VISIT (OUTPATIENT)
Dept: PAIN MEDICINE | Facility: CLINIC | Age: 49
End: 2024-01-19
Payer: MEDICARE

## 2024-01-19 VITALS
WEIGHT: 143 LBS | BODY MASS INDEX: 25.34 KG/M2 | HEIGHT: 63 IN | HEART RATE: 62 BPM | SYSTOLIC BLOOD PRESSURE: 132 MMHG | DIASTOLIC BLOOD PRESSURE: 75 MMHG

## 2024-01-19 DIAGNOSIS — M79.18 MYOFASCIAL PAIN SYNDROME: Primary | ICD-10-CM

## 2024-01-19 PROCEDURE — 20552 NJX 1/MLT TRIGGER POINT 1/2: CPT | Performed by: NURSE PRACTITIONER

## 2024-01-19 RX ORDER — METHYLPREDNISOLONE ACETATE 40 MG/ML
40 INJECTION, SUSPENSION INTRA-ARTICULAR; INTRALESIONAL; INTRAMUSCULAR; SOFT TISSUE ONCE
Status: COMPLETED | OUTPATIENT
Start: 2024-01-19 | End: 2024-01-19

## 2024-01-19 RX ORDER — BUPIVACAINE HYDROCHLORIDE 2.5 MG/ML
10 INJECTION, SOLUTION EPIDURAL; INFILTRATION; INTRACAUDAL ONCE
Status: COMPLETED | OUTPATIENT
Start: 2024-01-19 | End: 2024-01-19

## 2024-01-19 RX ADMIN — BUPIVACAINE HYDROCHLORIDE 10 ML: 2.5 INJECTION, SOLUTION EPIDURAL; INFILTRATION; INTRACAUDAL at 09:35

## 2024-01-19 RX ADMIN — METHYLPREDNISOLONE ACETATE 40 MG: 40 INJECTION, SUSPENSION INTRA-ARTICULAR; INTRALESIONAL; INTRAMUSCULAR; SOFT TISSUE at 09:36

## 2024-01-19 NOTE — PROGRESS NOTES
48-year-old male presents to the office today for trigger point injections into the left cervical paraspinal and trapezii musculature    After discussing the risks of the procedure including, but not limited to: unchanged or increased pain, bleeding, infection, allergic reaction, soft-tissue injury, nerve damage, pneumothorax, informed consent was obtained.  Procedural pause conducted to verify: correct patient identity, procedure to be performed. The appropriate hyper irritable musculoskeletal tender points were marked with a sterile pen, prepped with alcohol and sterilely draped.   After appropriate reproduction of pain at each of the tender points marked, a total of 6 mL of 0.25% bupivacaine and 40mg of Depo-Medrol was injected after negative aspiration of air, CSF, heme, or other bodily fluids with a 1.5 in 25-gauge needle. A total number of 24 muscle groups were injected. Additionally, dry-needling in a fanlike distribution was performed at each site. The patient was discharged with no apparent complications on their own power after an appropriate observation period.  Disposition: Motor function was intact. The patient was discharged home. The discharge instruction sheet was given to the patient. The patient was discharged with instructions to call immediately if there are any complications.  I did review the trigger point injection discharge instructions with the patient. I explained that the best results from the injections typically occur within the next 3-5 days. I did explain that it is normal to feel an increase in soreness and/or pain, and even bruising. I did encourage heat/cold regiments, which ever decreases pain, as well as continuing a home stretching program.

## 2024-01-19 NOTE — PATIENT INSTRUCTIONS
Trigger Point Injection   WHAT YOU NEED TO KNOW:   A trigger point injection is used to relax a muscle knot. This helps relieve pain.  DISCHARGE INSTRUCTIONS:   Call your local emergency number (911 in the [place]), or have someone call if:   Your mouth and throat are swollen.    You are wheezing or have trouble breathing.    You have chest pain or your heart is beating faster than usual.    You feel like you are going to faint.    When should I seek immediate care?   Your face is red or swollen.    You have hives that spread over your body.    You feel weak or dizzy.    Call your doctor or pain specialist if:   You have a fever within 1 week of the injection.    You have redness or swelling within 1 week of the injection.    You have new or worsening pain near the injection site.    You have questions or concerns about your condition or care.    Self-care:   Stay active after you have trigger point injections.  Gently move your joints through their full range of motion during the first week. Avoid strenuous activity for 3 or 4 days.    Do regular stretches of the trigger point muscle.  Place gentle pressure on the trigger point, and then stretch the muscle. Ask your healthcare provider for more information about how to stretch and apply pressure.    Apply ice to the injection site.  Use an ice pack, or put ice in a plastic bag. Cover the bag with a towel before you apply it. Apply ice for 15 to 20 minutes every hour, or as directed.    Apply heat to trigger point sites.  Heat can help relax muscles and relieve trigger point pain. Use a heat pack or a heating pad set on low. Apply heat for 15 minutes every hour, or as directed.    Follow up with your doctor or pain specialist as directed:  Write down your questions so you remember to ask them during your visits.  © Copyright [employer] 2023 Information is for End User's use only and may not be sold, redistributed or otherwise used for commercial purposes.  The above  information is an  only. It is not intended as medical advice for individual conditions or treatments. Talk to your doctor, nurse or pharmacist before following any medical regimen to see if it is safe and effective for you.

## 2024-02-28 ENCOUNTER — OFFICE VISIT (OUTPATIENT)
Dept: INTERNAL MEDICINE CLINIC | Facility: CLINIC | Age: 49
End: 2024-02-28

## 2024-02-28 VITALS
WEIGHT: 136 LBS | DIASTOLIC BLOOD PRESSURE: 70 MMHG | HEART RATE: 70 BPM | SYSTOLIC BLOOD PRESSURE: 111 MMHG | BODY MASS INDEX: 24.09 KG/M2

## 2024-02-28 DIAGNOSIS — E53.8 B12 DEFICIENCY: ICD-10-CM

## 2024-02-28 DIAGNOSIS — K59.04 CHRONIC IDIOPATHIC CONSTIPATION: Primary | Chronic | ICD-10-CM

## 2024-02-28 PROCEDURE — 99213 OFFICE O/P EST LOW 20 MIN: CPT | Performed by: INTERNAL MEDICINE

## 2024-02-28 PROCEDURE — G2211 COMPLEX E/M VISIT ADD ON: HCPCS | Performed by: INTERNAL MEDICINE

## 2024-02-28 RX ORDER — SENNA AND DOCUSATE SODIUM 50; 8.6 MG/1; MG/1
2 TABLET, FILM COATED ORAL 2 TIMES DAILY
Start: 2024-02-28

## 2024-02-28 NOTE — PROGRESS NOTES
[facility]  INTERNAL MEDICINE OFFICE VISIT     PATIENT INFORMATION     Bill Serrano   48 y.o. male   MRN: [id number]    ASSESSMENT/PLAN     Diagnoses and all orders for this visit:    Chronic idiopathic constipation  Constipation, LLQ abdominal pain, straining for several years.  Still constipated despite Miralax, Metamucil, adequate fluid intake, and exercise.  Colonoscopy from 9/20/23 showed hemorrhoids and removal of 2 tubular adenomas.  TSH from 1/8/24 3.293.  Suspect IBS-C and/or adverse affects from Lamitcal and Seroquel.      Start Senokot-s up to 2 tablets BID - advised patient to titrate as appropriate to resolve pain and straining  Start linaclotide 72 mcg - discussed to take 30 minutes prior to eating breakfast  Continue Miralax, metamucil, daily exercise, adequate fluid intake    -     H. pylori antigen, stool; Future  -     linaCLOtide 72 MCG CAPS; Take 72 mcg by mouth daily at least 30 minutes prior to the first meal of the day  -     senna-docusate sodium (SENOKOT-S) 8.6-50 mg per tablet; Take 2 tablets by mouth 2 (two) times a day    B12 deficiency  B12 85 on 1/8/24.  Started on IM B12 1000 mg weekly by his psychiatrist.  Endorses skin hypopigmentation and intermittent numbness of RUE and RLE.  Bilateral LE proprioception and sensation to light touch intact.  Ankle reflexes 2/4.  Negative Romberg.  Suspect pernicious anemia.    Check IF antibody, stool H. pylori  Repeat B12 and check folate before next visit  Follow up [place] 2 [place]  Continue IM B12 every week    -     Intrinsic factor blocking antibody; Future  -     Vitamin B12; Future  -     CBC and differential; Future  -     Folate; Future      Schedule a follow-up appointment in 2 months.    HEALTH MAINTENANCE     Immunization History   Administered Date(s) Administered    Influenza Quadrivalent, 6-35 Months IM 12/22/2016, 10/19/2017    Influenza, injectable,  quadrivalent, preservative free 0.5 mL 09/12/2018    Influenza, seasonal, injectable 11/26/2013     CHIEF COMPLAINT     Chief Complaint   Patient presents with    Follow-up     Follow up constipation      HISTORY OF PRESENT ILLNESS     Bill Serrano is a 48 y.o. male with a history of hemorrhoids, bipolar disorder, constipation, myofascial pain syndrome, cervical and lumbar radiculopathy s/p ACDF C5-6, BPH s/p TURP here for follow up constipation.  Has not seen any improvement since last visit on 11/13, still has bowel movements every 3 days and strains.  LLQ pain that is relieved with bowel movements.  Taking Miralax and Metamucil every day.  States he drinks a lot of water.  Sees blood in stool and on toilet paper.  Takes Zanaflex as needed, maybe once a week.  Has been on Seroquel and Lamictal for several years.      Intermittent numbness of left hand and left foot.  Endorses hypopigmentation of skin diffusely.      His psychiatrist started IM B12 1000 mg weekly due to low B12.  Has received 1 dose.      REVIEW OF SYSTEMS     Review of Systems   Constitutional:  Negative for appetite change, chills, fever and unexpected weight change.   HENT:  Negative for rhinorrhea and sore throat.    Respiratory:  Negative for cough and shortness of breath.    Cardiovascular:  Negative for chest pain, palpitations and leg swelling.   Gastrointestinal:  Positive for abdominal pain, blood in stool and constipation. Negative for diarrhea, nausea and vomiting.   Genitourinary:  Negative for difficulty urinating, dysuria and hematuria.   Skin:  Positive for color change.   Neurological:  Negative for weakness, light-headedness, numbness and headaches.     OBJECTIVE     Vitals:    02/28/24 1338   BP: 111/70   Pulse: 70   Weight: 61.7 kg (136 lb)     Physical Exam  Vitals reviewed.   Constitutional:       General: He is not in acute distress.  HENT:      Head: Normocephalic and atraumatic.      Nose: No rhinorrhea.   Eyes:       General: No scleral icterus.  Cardiovascular:      Rate and Rhythm: Normal rate and regular rhythm.      Pulses: Normal pulses.      Heart sounds: Normal heart sounds. No murmur heard.  Pulmonary:      Effort: Pulmonary effort is normal. No respiratory distress.      Breath sounds: Normal breath sounds. No wheezing, rhonchi or rales.   Abdominal:      General: Bowel sounds are normal. There is no distension.      Palpations: Abdomen is soft.      Tenderness: There is no abdominal tenderness. There is no guarding or rebound.   Musculoskeletal:      Right lower leg: No edema.      Left lower leg: No edema.   Skin:     General: Skin is warm and dry.      Coloration: Skin is not jaundiced.   Neurological:      Mental Status: He is alert.      Cranial Nerves: No dysarthria.      Coordination: Romberg sign negative.      Comments: Moves all 4 extremities, feet proprioception intact, S1 reflex 2/4 bilaterally, sensation to light touch intact in bilateral feet    Psychiatric:         Mood and Affect: Mood normal.         Behavior: Behavior normal.       CURRENT MEDICATIONS     Current Outpatient Medications:     acetaminophen (Tylenol) 325 mg tablet, Take 650 mg by mouth every 6 (six) hours as needed for mild pain, Disp: , Rfl:     lamoTRIgine (LaMICtal) 25 mg tablet, Take 2 tablets (50 mg total) by mouth in the morning, Disp: 60 tablet, Rfl: 2    polyethylene glycol (MIRALAX) 17 g packet, Take 17 g by mouth daily, Disp: 510 g, Rfl: 0    psyllium (METAMUCIL SMOOTH TEXTURE) 28 % packet, Take 1 packet by mouth 2 (two) times a day, Disp: 180 packet, Rfl: 0    QUEtiapine (SEROquel) 300 mg tablet, Take 1 tablet (300 mg total) by mouth daily at bedtime, Disp: 90 tablet, Rfl: 0    tiZANidine (ZANAFLEX) 4 mg tablet, Take 1 tablet (4 mg total) by mouth every 8 (eight) hours as needed for muscle spasms, Disp: 90 tablet, Rfl: 2    PAST MEDICAL & SURGICAL HISTORY     Past Medical History:   Diagnosis Date    Anxiety     Bipolar  disorder (HCC)     Chronic back pain     with left sided sciatica    Constipation     Fibromyalgia, primary     Hemorrhoids     last assessed - [id number]    Hyperlipidemia      Past Surgical History:   Procedure Laterality Date    CERVICAL FUSION      Cervical Vertbral fusion    VT EXC VARICOCELE/LIGATION SPERMATIC VEINS SPX Left 8/27/2019    Procedure: VARICOCELECTOMY;  Surgeon: Brayan Grady MD;  Location: [facility];  Service: Urology    VT TRURL ELECTROSURG RESCJ PROSTATE BLEED COMPLETE N/A 11/17/2020    Procedure: TRANSURETHRAL VAPORIZATION OF PROSTATE (TURP);  Surgeon: Brayan Grady MD;  Location: [facility];  Service: Urology     SOCIAL & FAMILY HISTORY     Social History     Socioeconomic History    Marital status: /Civil Union     Spouse name: Not on file    Number of children: Not on file    Years of education: Not on file    Highest education level: Not on file   Occupational History    Occupation: Disabled   Tobacco Use    Smoking status: Never    Smokeless tobacco: Never   Vaping Use    Vaping status: Never Used   Substance and Sexual Activity    Alcohol use: No    Drug use: No    Sexual activity: Yes   Other Topics Concern    Not on file   Social History Narrative    Not on file     Social Determinants of Health     Financial Resource Strain: Low Risk  (2/28/2024)    Overall Financial Resource Strain (CARDIA)     Difficulty of Paying Living Expenses: Not hard at all   Food Insecurity: No Food Insecurity (2/28/2024)    Hunger Vital Sign     Worried About Running Out of Food in the Last Year: Never true     Ran Out of Food in the Last Year: Never true   Transportation Needs: No Transportation Needs (2/28/2024)    PRAPARE - Transportation     Lack of Transportation (Medical): No     Lack of Transportation (Non-Medical): No   Physical Activity: Not on file   Stress: Not on file   Social Connections: Not on file   Intimate Partner Violence: Not on file   Housing Stability: Low Risk   (5/16/2022)    Housing Stability Vital Sign     Unable to Pay for Housing in the Last Year: No     Number of Places Lived in the Last Year: 1     Unstable Housing in the Last Year: No     Social History     Substance and Sexual Activity   Alcohol Use No       Social History     Substance and Sexual Activity   Drug Use No     Social History     Tobacco Use   Smoking Status Never   Smokeless Tobacco Never     Family History   Problem Relation Age of Onset    Arthritis Mother     Hyperlipidemia Mother     Hyperthyroidism Father     Other Father         Prostate disease    Colon cancer Sister 44    Other Sister         Epilepsy    Asthma Brother          ==  Lalita Presley DO  Internal Medicine Resident, PGY-3  [facility] Internal Medicine Residency    [address], [address]  [place] [address]  Office: [phone]  Fax: [phone]

## 2024-02-28 NOTE — PATIENT INSTRUCTIONS
Get blood work done 1 week before you next appointment.  Give a stool sample.    Take Linzess (linaclotide) 30 minutes before you eat breakfast every day.  Take Senokot-S up to 2 tablets twice a day.

## 2024-02-29 ENCOUNTER — TELEPHONE (OUTPATIENT)
Dept: INTERNAL MEDICINE CLINIC | Facility: CLINIC | Age: 49
End: 2024-02-29

## 2024-02-29 DIAGNOSIS — K59.04 CHRONIC IDIOPATHIC CONSTIPATION: Primary | ICD-10-CM

## 2024-02-29 NOTE — TELEPHONE ENCOUNTER
A referral to GI was placed.  They may have free samples and may have more resources that can cover or discount the price of linaclotide.  Patient can continue to take Senokot-S in the meantime as discussed at our visit yesterday.  Thank you!

## 2024-02-29 NOTE — TELEPHONE ENCOUNTER
Received call from patient stating his medication cost $145.00 and is unable to afford.     Contacted [facility] pharmacy to confirm which medication copay is $145.00. Spoke with pharmacist. Patient's Linaclotide caps copay cost is $145.00.     Please review if there is another medication that patient is able to take in place of Linaclotide.     Contacted patient at . Introduced self and role. Patient stated his spouse is going to pay for medication this time. Patient updated physician will be notified also of cost if there is something else available for future. All questions/concerns answered at this time.

## 2024-03-11 ENCOUNTER — TELEPHONE (OUTPATIENT)
Dept: INTERNAL MEDICINE CLINIC | Facility: CLINIC | Age: 49
End: 2024-03-11

## 2024-03-11 NOTE — TELEPHONE ENCOUNTER
Patient and SO came in, asking for a prescription for syringes for the medication Cyanocobalam 1000 MCG, stated that they spoke to PCP about medication, and needed the prescription for the syringes. They said something about getting them through here before

## 2024-03-12 NOTE — TELEPHONE ENCOUNTER
Patient is not currently on Cyanocobalam 1000 MCG. It is not listed on current med list.     It was D/C on 11/18/2022.

## 2024-04-17 ENCOUNTER — RA CDI HCC (OUTPATIENT)
Dept: OTHER | Facility: HOSPITAL | Age: 49
End: 2024-04-17

## 2024-04-18 ENCOUNTER — APPOINTMENT (OUTPATIENT)
Dept: LAB | Facility: CLINIC | Age: 49
End: 2024-04-18
Payer: MEDICARE

## 2024-04-18 DIAGNOSIS — E53.8 B12 DEFICIENCY: ICD-10-CM

## 2024-04-18 DIAGNOSIS — K59.04 CHRONIC IDIOPATHIC CONSTIPATION: Chronic | ICD-10-CM

## 2024-04-18 LAB
BASOPHILS # BLD AUTO: 0.02 THOUSANDS/ÂΜL (ref 0–0.1)
BASOPHILS NFR BLD AUTO: 0 % (ref 0–1)
EOSINOPHIL # BLD AUTO: 0.09 THOUSAND/ÂΜL (ref 0–0.61)
EOSINOPHIL NFR BLD AUTO: 2 % (ref 0–6)
ERYTHROCYTE [DISTWIDTH] IN BLOOD BY AUTOMATED COUNT: 12 % (ref 11.6–15.1)
FOLATE SERPL-MCNC: 19 NG/ML
HCT VFR BLD AUTO: 45.4 % (ref 36.5–49.3)
HGB BLD-MCNC: 14.6 G/DL (ref 12–17)
IMM GRANULOCYTES # BLD AUTO: 0.01 THOUSAND/UL (ref 0–0.2)
IMM GRANULOCYTES NFR BLD AUTO: 0 % (ref 0–2)
LYMPHOCYTES # BLD AUTO: 1.54 THOUSANDS/ÂΜL (ref 0.6–4.47)
LYMPHOCYTES NFR BLD AUTO: 30 % (ref 14–44)
MCH RBC QN AUTO: 28 PG (ref 26.8–34.3)
MCHC RBC AUTO-ENTMCNC: 32.2 G/DL (ref 31.4–37.4)
MCV RBC AUTO: 87 FL (ref 82–98)
MONOCYTES # BLD AUTO: 0.59 THOUSAND/ÂΜL (ref 0.17–1.22)
MONOCYTES NFR BLD AUTO: 11 % (ref 4–12)
NEUTROPHILS # BLD AUTO: 2.94 THOUSANDS/ÂΜL (ref 1.85–7.62)
NEUTS SEG NFR BLD AUTO: 57 % (ref 43–75)
NRBC BLD AUTO-RTO: 0 /100 WBCS
PLATELET # BLD AUTO: 236 THOUSANDS/UL (ref 149–390)
PMV BLD AUTO: 10 FL (ref 8.9–12.7)
RBC # BLD AUTO: 5.22 MILLION/UL (ref 3.88–5.62)
VIT B12 SERPL-MCNC: 1572 PG/ML (ref 180–914)
WBC # BLD AUTO: 5.19 THOUSAND/UL (ref 4.31–10.16)

## 2024-04-18 PROCEDURE — 82746 ASSAY OF FOLIC ACID SERUM: CPT

## 2024-04-18 PROCEDURE — 86340 INTRINSIC FACTOR ANTIBODY: CPT

## 2024-04-18 PROCEDURE — 87338 HPYLORI STOOL AG IA: CPT

## 2024-04-18 PROCEDURE — 82607 VITAMIN B-12: CPT

## 2024-04-18 PROCEDURE — 85025 COMPLETE CBC W/AUTO DIFF WBC: CPT

## 2024-04-18 PROCEDURE — 36415 COLL VENOUS BLD VENIPUNCTURE: CPT

## 2024-04-19 LAB — H PYLORI AG STL QL IA: NEGATIVE

## 2024-04-21 LAB — IF BLOCK AB SER QL RIA: 2.4 AU/ML (ref 0–1.1)

## 2024-04-24 ENCOUNTER — OFFICE VISIT (OUTPATIENT)
Dept: OBGYN CLINIC | Facility: HOSPITAL | Age: 49
End: 2024-04-24
Payer: MEDICARE

## 2024-04-24 ENCOUNTER — OFFICE VISIT (OUTPATIENT)
Dept: INTERNAL MEDICINE CLINIC | Facility: CLINIC | Age: 49
End: 2024-04-24

## 2024-04-24 ENCOUNTER — HOSPITAL ENCOUNTER (OUTPATIENT)
Dept: RADIOLOGY | Facility: HOSPITAL | Age: 49
Discharge: HOME | End: 2024-04-24
Attending: ORTHOPAEDIC SURGERY
Payer: MEDICARE

## 2024-04-24 VITALS
BODY MASS INDEX: 24.1 KG/M2 | WEIGHT: 136 LBS | DIASTOLIC BLOOD PRESSURE: 87 MMHG | HEART RATE: 54 BPM | SYSTOLIC BLOOD PRESSURE: 151 MMHG | HEIGHT: 63 IN

## 2024-04-24 VITALS
BODY MASS INDEX: 23.92 KG/M2 | WEIGHT: 135 LBS | OXYGEN SATURATION: 98 % | SYSTOLIC BLOOD PRESSURE: 134 MMHG | DIASTOLIC BLOOD PRESSURE: 75 MMHG | RESPIRATION RATE: 18 BRPM | TEMPERATURE: 97.7 F | HEIGHT: 63 IN | HEART RATE: 63 BPM

## 2024-04-24 DIAGNOSIS — S63.642A RUPTURE OF ULNAR COLLATERAL LIGAMENT OF LEFT THUMB, INITIAL ENCOUNTER: ICD-10-CM

## 2024-04-24 DIAGNOSIS — H53.8 BLURRY VISION: ICD-10-CM

## 2024-04-24 DIAGNOSIS — M79.642 BILATERAL HAND PAIN: Primary | ICD-10-CM

## 2024-04-24 DIAGNOSIS — R21 RASH: ICD-10-CM

## 2024-04-24 DIAGNOSIS — M79.641 BILATERAL HAND PAIN: ICD-10-CM

## 2024-04-24 DIAGNOSIS — S63.641A RUPTURE OF ULNAR COLLATERAL LIGAMENT OF RIGHT THUMB, INITIAL ENCOUNTER: ICD-10-CM

## 2024-04-24 DIAGNOSIS — D51.0 PERNICIOUS ANEMIA: Primary | ICD-10-CM

## 2024-04-24 DIAGNOSIS — M79.641 BILATERAL HAND PAIN: Primary | ICD-10-CM

## 2024-04-24 DIAGNOSIS — M79.642 BILATERAL HAND PAIN: ICD-10-CM

## 2024-04-24 PROCEDURE — G2211 COMPLEX E/M VISIT ADD ON: HCPCS | Performed by: INTERNAL MEDICINE

## 2024-04-24 PROCEDURE — 73140 X-RAY EXAM OF FINGER(S): CPT

## 2024-04-24 PROCEDURE — 99213 OFFICE O/P EST LOW 20 MIN: CPT | Performed by: INTERNAL MEDICINE

## 2024-04-24 PROCEDURE — 99204 OFFICE O/P NEW MOD 45 MIN: CPT | Performed by: ORTHOPAEDIC SURGERY

## 2024-04-24 RX ORDER — CYANOCOBALAMIN 1000 UG/ML
1000 INJECTION, SOLUTION INTRAMUSCULAR; SUBCUTANEOUS
Qty: 3 ML | Refills: 3 | Status: SHIPPED | OUTPATIENT
Start: 2024-04-24 | End: 2024-07-23

## 2024-04-24 RX ORDER — CYANOCOBALAMIN, ISOPROPYL ALCOHOL 1000MCG/ML
1000 KIT INJECTION WEEKLY
COMMUNITY
End: 2024-04-24 | Stop reason: ALTCHOICE

## 2024-04-24 NOTE — PROGRESS NOTES
"[facility]  INTERNAL MEDICINE OFFICE VISIT     PATIENT INFORMATION     Bill Serrano   48 y.o. male   MRN: [id number]    ASSESSMENT/PLAN     Diagnoses and all orders for this visit:    Pernicious anemia  Known B12 deficiency with elevated IF antibody 2.4.  Repeat B12 level 1572 since starting IM therapy.  Stool H. Pylori negative.      Continue B12 injections indefinitely  Referral to GI as patient is at higher risk for gastric cancer   Discussed with patient he will need to continue the above for the rest of his life  Sent needles and syringes to pharmacy    -     Insulin syringes  -     cyanocobalamin 1,000 mcg/mL; Inject 1 mL (1,000 mcg total) into a muscle every 30 (thirty) days  -     NEEDLE, DISP, 25 G 25G X 1\" MISC; Use once a week    Rash  Lesion on left dorsal wrist x 1 week.  Patient thinks it initially looked like  wart then size increased and it became red.  Denies outdoor exposure, animal exposure, insect bites, picking at the lesion.  Differential includes inflamed seborrheic keratosis, infection, malignancy.    Mupirocin BID x 7 days  If no resolution then referral to dermatology for possible biopsy    -     mupirocin (BACTROBAN) 2 % ointment; Apply topically 2 (two) times a day for 7 days    Blurry vision  Endorses blurry vision, unsure if bilateral or unilateral.  Not associated with headaches.  Per attending physician's exam, EOM intact and fundoscopic exam was unremarkable.    Referral to ophthalmology (Dr. Brayan Nieves) and given contact information for [facility]  Patient has Medicare and is part of the clinic's financial aid program  Advised patient to see if Dr. Nieves or [facility] would be more affordable before making an appointment    -     Ambulatory Referral to Ophthalmology; Future  -     Ambulatory Referral to Social Work Care Management Program; Future    Other orders  -     Discontinue: Cyanocobalamin (B-12 Compliance Injection) 1000 MCG/ML KIT; " Inject 1,000 mcg as directed once a week      Schedule a follow-up appointment in 6 weeks for [facility].    HEALTH MAINTENANCE     Immunization History   Administered Date(s) Administered    Influenza Quadrivalent, 6-35 Months IM 12/22/2016, 10/19/2017    Influenza, injectable, quadrivalent, preservative free 0.5 mL 09/12/2018    Influenza, seasonal, injectable 11/26/2013     CHIEF COMPLAINT     Chief Complaint   Patient presents with    Follow-up      HISTORY OF PRESENT ILLNESS     Bill Serrano is a 48 y.o. male with a history of cervical and lumbar radiculopathy s/p C5-6 fusion, BPH s/p TURP, hyperlipidemia, B12 deficiency here for follow up of labs.  Intrinsic factor antibody 2.4.  B12 1572.  Folate 19.  Stool H. Pylori negative.      Developed a perioral and right cheek rash 1 week ago.  Was red, pruritic. Swollen.  Put isopropyl alcohol on it.  Had similar rash months ago.  No animal exposure.  Denied blister.      Noted a lesion on his left wrist that looked a wart.  States it has become redder and size increased.  Mild burning.  Denies pruritus, prior episode, outdoor exposure, trauma.      Also states he has blurry vision without headache.  Unsure if bilateral or unilateral.    REVIEW OF SYSTEMS     Review of Systems   Constitutional:  Negative for appetite change, chills, diaphoresis, fever and unexpected weight change.   Eyes:  Positive for visual disturbance.   Respiratory:  Negative for cough and shortness of breath.    Cardiovascular:  Negative for chest pain.   Gastrointestinal:  Negative for abdominal pain, constipation, diarrhea, nausea and vomiting.   Genitourinary:  Negative for difficulty urinating.   Skin:  Positive for rash and wound.   Neurological:  Negative for dizziness, weakness, light-headedness, numbness and headaches.     OBJECTIVE     Vitals:    04/24/24 1328   BP: 134/75   Pulse: 63   Resp: 18   Temp: 97.7 °F (36.5 °C)   TempSrc: Temporal   SpO2: 98%   Weight: 61.2 kg (135 lb)  "  Height: 5' 3\" (1.6 m)     Physical Exam  Vitals reviewed.   Constitutional:       General: He is not in acute distress.  HENT:      Head: Normocephalic and atraumatic.      Nose: No rhinorrhea.      Mouth/Throat:      Lips: No lesions.      Mouth: Mucous membranes are moist. No oral lesions.      Dentition: No gum lesions.      Tongue: No lesions.      Palate: No lesions.      Pharynx: Oropharynx is clear.   Eyes:      General: No scleral icterus.  Cardiovascular:      Rate and Rhythm: Normal rate and regular rhythm.      Pulses: Normal pulses.      Heart sounds: Normal heart sounds. No murmur heard.  Pulmonary:      Effort: Pulmonary effort is normal. No respiratory distress.      Breath sounds: Normal breath sounds. No wheezing, rhonchi or rales.   Musculoskeletal:      Right lower leg: No edema.      Left lower leg: No edema.   Skin:     General: Skin is warm and dry.      Coloration: Skin is not jaundiced.      Findings: Lesion (rough keratotic spot about 1 cm with surrounding redness and dry skin on left dorsal wrist) present.   Neurological:      Mental Status: He is alert.      Cranial Nerves: No dysarthria.      Comments: CN 2-12 grossly intact, moves all 4 extremities   Psychiatric:         Mood and Affect: Mood normal.         Behavior: Behavior normal.       CURRENT MEDICATIONS     Current Outpatient Medications:     acetaminophen (Tylenol) 325 mg tablet, Take 650 mg by mouth every 6 (six) hours as needed for mild pain, Disp: , Rfl:     lamoTRIgine (LaMICtal) 25 mg tablet, Take 2 tablets (50 mg total) by mouth in the morning, Disp: 60 tablet, Rfl: 2    linaCLOtide 72 MCG CAPS, Take 72 mcg by mouth daily at least 30 minutes prior to the first meal of the day, Disp: 30 capsule, Rfl: 5    polyethylene glycol (MIRALAX) 17 g packet, Take 17 g by mouth daily, Disp: 510 g, Rfl: 0    psyllium (METAMUCIL SMOOTH TEXTURE) 28 % packet, Take 1 packet by mouth 2 (two) times a day, Disp: 180 packet, Rfl: 0    " QUEtiapine (SEROquel) 300 mg tablet, Take 1 tablet (300 mg total) by mouth daily at bedtime, Disp: 90 tablet, Rfl: 0    senna-docusate sodium (SENOKOT-S) 8.6-50 mg per tablet, Take 2 tablets by mouth 2 (two) times a day, Disp: , Rfl:     tiZANidine (ZANAFLEX) 4 mg tablet, Take 1 tablet (4 mg total) by mouth every 8 (eight) hours as needed for muscle spasms, Disp: 90 tablet, Rfl: 2    PAST MEDICAL & SURGICAL HISTORY     Past Medical History:   Diagnosis Date    Anxiety     Bipolar disorder (HCC)     Chronic back pain     with left sided sciatica    Constipation     Fibromyalgia, primary     Hemorrhoids     last assessed - 09Aug2012    Hyperlipidemia      Past Surgical History:   Procedure Laterality Date    CERVICAL FUSION      Cervical Vertbral fusion    WA EXC VARICOCELE/LIGATION SPERMATIC VEINS SPX Left 8/27/2019    Procedure: VARICOCELECTOMY;  Surgeon: Brayan Grady MD;  Location[facility];  Service: Urology    WA TRURL ELECTROSURG RESCJ PROSTATE BLEED COMPLETE N/A 11/17/2020    Procedure: TRANSURETHRAL VAPORIZATION OF PROSTATE (TURP);  Surgeon: Brayan Grady MD;  [facility];  Service: Urology     SOCIAL & FAMILY HISTORY     Social History     Socioeconomic History    Marital status: /Civil Union     Spouse name: Not on file    Number of children: Not on file    Years of education: Not on file    Highest education level: Not on file   Occupational History    Occupation: Disabled   Tobacco Use    Smoking status: Never    Smokeless tobacco: Never   Vaping Use    Vaping status: Never Used   Substance and Sexual Activity    Alcohol use: No    Drug use: No    Sexual activity: Yes   Other Topics Concern    Not on file   Social History Narrative    Not on file     Social Determinants of Health     Financial Resource Strain: High Risk (4/17/2024)    Overall Financial Resource Strain (CARDIA)     Difficulty of Paying Living Expenses: Hard   Food Insecurity: Food Insecurity Present (4/17/2024)     Hunger Vital Sign     Worried About Running Out of Food in the Last Year: Often true     Ran Out of Food in the Last Year: Sometimes true   Transportation Needs: No Transportation Needs (4/17/2024)    PRAPARE - Transportation     Lack of Transportation (Medical): No     Lack of Transportation (Non-Medical): No   Physical Activity: Not on file   Stress: Not on file   Social Connections: Not on file   Intimate Partner Violence: Not on file   Housing Stability: Low Risk  (4/17/2024)    Housing Stability Vital Sign     Unable to Pay for Housing in the Last Year: No     Number of Places Lived in the Last Year: 0     Unstable Housing in the Last Year: No     Social History     Substance and Sexual Activity   Alcohol Use No       Social History     Substance and Sexual Activity   Drug Use No     Social History     Tobacco Use   Smoking Status Never   Smokeless Tobacco Never     Family History   Problem Relation Age of Onset    Arthritis Mother     Hyperlipidemia Mother     Hyperthyroidism Father     Other Father         Prostate disease    Colon cancer Sister 44    Other Sister         Epilepsy    Asthma Brother          ==  Lalita Presley DO  Internal Medicine Resident, PGY-3  [facility] Internal Medicine Residency    [address], [address]  [place] [address]  Office: [phone]  Fax: [phone]

## 2024-04-24 NOTE — PROGRESS NOTES
ASSESSMENT/PLAN:    Assessment:   Bilateral thumb UCL injury    Plan:   The patient has an examination consistent with UCL sprain vs tear.  I have discussed with the patient the pathophysiology of this diagnosis and reviewed how the examination correlates with this diagnosis.  Treatment options were discussed at length to include: Hand therapy for strengthening vs surgical interventions.Prior to making a decision on treatment I would like to get an MRI to evaluate the integrity of the UCL.    Follow Up:  After Testing    To Do Next Visit:  Discuss MRIs     _____________________________________________________  CHIEF COMPLAINT:  Chief Complaint   Patient presents with    Left Hand - Pain    Right Hand - Pain         SUBJECTIVE:  Bill Serrano is a 48 y.o. male who presents with Pain  Moderate  Intermittant  Sharp, Dull, and Aching to the bilateral thumb.left > right  This started several months or years ago patient is unsure as to how long.  He reports bilateral thumb MP pain.  Pain is worse with gripping and activities.  Denies N/T, catching, or clicking.   Radiation: None  Previous Treatments: None without relief  Associated symptoms: Pain  Moderate  Intermittant  Dull and Aching  Handedness: right      PAST MEDICAL HISTORY:  Past Medical History:   Diagnosis Date    Anxiety     Bipolar disorder (HCC)     Chronic back pain     with left sided sciatica    Constipation     Fibromyalgia, primary     Hemorrhoids     last assessed - 09Aug2012    Hyperlipidemia        PAST SURGICAL HISTORY:  Past Surgical History:   Procedure Laterality Date    CERVICAL FUSION      Cervical Vertbral fusion    NM EXC VARICOCELE/LIGATION SPERMATIC VEINS SPX Left 8/27/2019    Procedure: VARICOCELECTOMY;  Surgeon: Bryaan Grady MD;  [facility];  Service: Urology    NM TRURL ELECTROSURG RESCJ PROSTATE BLEED COMPLETE N/A 11/17/2020    Procedure: TRANSURETHRAL VAPORIZATION OF PROSTATE (TURP);  Surgeon: Brayan Grady  MD;  [facility];  Service: Urology       FAMILY HISTORY:  Family History   Problem Relation Age of Onset    Arthritis Mother     Hyperlipidemia Mother     Hyperthyroidism Father     Other Father         Prostate disease    Colon cancer Sister 44    Other Sister         Epilepsy    Asthma Brother        SOCIAL HISTORY:  Social History     Tobacco Use    Smoking status: Never    Smokeless tobacco: Never   Vaping Use    Vaping status: Never Used   Substance Use Topics    Alcohol use: No    Drug use: No       MEDICATIONS:    Current Outpatient Medications:     acetaminophen (Tylenol) 325 mg tablet, Take 650 mg by mouth every 6 (six) hours as needed for mild pain, Disp: , Rfl:     linaCLOtide 72 MCG CAPS, Take 72 mcg by mouth daily at least 30 minutes prior to the first meal of the day, Disp: 30 capsule, Rfl: 5    senna-docusate sodium (SENOKOT-S) 8.6-50 mg per tablet, Take 2 tablets by mouth 2 (two) times a day, Disp: , Rfl:     tiZANidine (ZANAFLEX) 4 mg tablet, Take 1 tablet (4 mg total) by mouth every 8 (eight) hours as needed for muscle spasms, Disp: 90 tablet, Rfl: 2    lamoTRIgine (LaMICtal) 25 mg tablet, Take 2 tablets (50 mg total) by mouth in the morning, Disp: 60 tablet, Rfl: 2    polyethylene glycol (MIRALAX) 17 g packet, Take 17 g by mouth daily, Disp: 510 g, Rfl: 0    psyllium (METAMUCIL SMOOTH TEXTURE) 28 % packet, Take 1 packet by mouth 2 (two) times a day, Disp: 180 packet, Rfl: 0    QUEtiapine (SEROquel) 300 mg tablet, Take 1 tablet (300 mg total) by mouth daily at bedtime, Disp: 90 tablet, Rfl: 0    ALLERGIES:  No Known Allergies    REVIEW OF SYSTEMS:  Pertinent items are noted in HPI.  A comprehensive review of systems was negative.    LABS:  HgA1c:   Lab Results   Component Value Date    HGBA1C 5.6 01/08/2024     BMP:   Lab Results   Component Value Date    CALCIUM 9.9 01/08/2024    K 4.1 01/08/2024    CO2 31 01/08/2024     01/08/2024    BUN 21 01/08/2024    CREATININE 1.10 01/08/2024  "        _____________________________________________________  PHYSICAL EXAMINATION:  Vital signs: /87   Pulse (!) 54   Ht 5' 3\" (1.6 m)   Wt 61.7 kg (136 lb)   BMI 24.09 kg/m²   General: well developed and well nourished, alert, oriented times 3, and appears comfortable  Psychiatric: Normal  HEENT: Trachea Midline, No torticollis  Cardiovascular: No discernable arrhythmia  Pulmonary: No wheezing or stridor  Abdomen: No rebound or guarding  Extremities: No peripheral edema  Skin: No masses, erythema, lacerations, fluctation, ulcerations  Neurovascular: Sensation Intact to the Median, Ulnar, Radial Nerve, Motor Intact to the Median, Ulnar, Radial Nerve, and Pulses Intact    MUSCULOSKELETAL EXAMINATION:  Bilateral thumbs:  Negative tinel's  Bilateral thumbs:  Positive palpable nodule over the A1 pulley.  Positive tenderness to palpation over A1 pulley. Negative catching. Negative clicking.     Laxity UCL right 50-60 degrees, left 40-50    _____________________________________________________  STUDIES REVIEWED:  Images were reviewed in PACS by Dr. Menchaca and demonstrate: B/L thumb x-rays demonstrates no fracture or dislocation      PROCEDURES PERFORMED:  Procedures  No Procedures performed today  Scribe Attestation      I,:   am acting as a scribe while in the presence of the attending physician.:       I,:   personally performed the services described in this documentation    as scribed in my presence.:             "

## 2024-04-29 ENCOUNTER — PATIENT OUTREACH (OUTPATIENT)
Dept: INTERNAL MEDICINE CLINIC | Facility: CLINIC | Age: 49
End: 2024-04-29

## 2024-04-29 NOTE — PROGRESS NOTES
SW has reached out to pt via  ID # [id number] to speak to pt re resources for blurry vision.  SW and  left a message for pt to return SW call.  ALIYAH had provided PCP  info re ([facility]) [facility] [phone]  @ [address] [place] [address] which he shares with pt.  ALIYAH awaits his return call.

## 2024-04-30 ENCOUNTER — PATIENT OUTREACH (OUTPATIENT)
Dept: INTERNAL MEDICINE CLINIC | Facility: CLINIC | Age: 49
End: 2024-04-30

## 2024-04-30 NOTE — PROGRESS NOTES
SW has attempted to return call to pt who left a message this AM.  SW to remain available and f/u as indicated.  SW received return call from pt and his wife who assisted with translation as per his request.  Pt has asked for info re eye care resources as he has Medicare and [employer] only.    SW has reviewed the info previously provided about the  ([facility]) [facility] [phone]  @ [address] [place] [address] .  Pt to f/u with them.    Patient does not have any further questions, concerns, or other needs at this time.  Patient has my contact # and PCP office # if needed.  Social Work to remain available to assist as indicated.    Please re-consult Social Work if needed.

## 2024-05-01 ENCOUNTER — OFFICE VISIT (OUTPATIENT)
Dept: GASTROENTEROLOGY | Facility: CLINIC | Age: 49
End: 2024-05-01
Payer: MEDICARE

## 2024-05-01 VITALS
SYSTOLIC BLOOD PRESSURE: 124 MMHG | BODY MASS INDEX: 23.67 KG/M2 | TEMPERATURE: 98 F | DIASTOLIC BLOOD PRESSURE: 82 MMHG | WEIGHT: 133.6 LBS | HEIGHT: 63 IN

## 2024-05-01 DIAGNOSIS — Z00.00 PREVENTATIVE HEALTH CARE: ICD-10-CM

## 2024-05-01 DIAGNOSIS — K21.9 CHRONIC GERD: ICD-10-CM

## 2024-05-01 DIAGNOSIS — K59.09 CHRONIC CONSTIPATION: ICD-10-CM

## 2024-05-01 DIAGNOSIS — R14.0 BLOATING: ICD-10-CM

## 2024-05-01 DIAGNOSIS — R10.84 GENERALIZED ABDOMINAL PAIN: Primary | ICD-10-CM

## 2024-05-01 PROCEDURE — 99214 OFFICE O/P EST MOD 30 MIN: CPT | Performed by: INTERNAL MEDICINE

## 2024-05-01 PROCEDURE — G2211 COMPLEX E/M VISIT ADD ON: HCPCS | Performed by: INTERNAL MEDICINE

## 2024-05-01 RX ORDER — LINACLOTIDE 72 UG/1
72 CAPSULE, GELATIN COATED ORAL DAILY
Qty: 30 CAPSULE | Refills: 5 | Status: SHIPPED | OUTPATIENT
Start: 2024-05-01 | End: 2024-10-28

## 2024-05-01 NOTE — PATIENT INSTRUCTIONS
Scheduled date of EGD(as of today):175695  Physician performing EGD:DR ANGUIANO  Location of EGD:[facility]  Instructions reviewed with patient by:SHELDON  Clearances: N/A  OK'D BY BLAINE

## 2024-05-01 NOTE — PROGRESS NOTES
[facility] - Outpatient Follow-up Note  Bill Serrano 48 y.o. male MRN: [id number]  Encounter: [id number]          ASSESSMENT AND PLAN:      1.  Chronic constipation  2.  Chronic abdominal pain  3.  Bloating  4.  Chronic GERD  5.  Preventative health care    Patient has chronic symptoms which are uncontrolled.  Differentials include uncontrolled upper GI symptoms secondary to uncontrolled constipation.  Patient did have continuation of symptoms despite control of constipation in the past so other possibilities peptic ulcer disease, gastritis, H. pylori.  Patient has had chronic heartburn and will need screening for Duggan's as well.  He is agreeable to get EGD done.  I ordered the procedure today.  In the meanwhile we will restart Linzess since it helped him control the symptoms in the past.  Told patient to take the medication at least half an hour prior to breakfast in the morning.  Patient had hepatitis C screening test/hepatitis C antibody done in 2023 which was reviewed by me today and found to be negative for the infection.  Will discuss about hepatitis B screening at next office visit.  Further management plan based on investigation results.    RTC 4 to 6 months.    Jeronimo Bonilla MD  Gastroenterology  [facility]  Date: May 1, 2024    ______________________________________________________________________    SUBJECTIVE: 48-year-old with chronic constipation, B12 deficiency, family history of colon cancer presents for evaluation.    Patient reports that he has been having abdominal pain, bloating along with constipation for a few years.  He tried MiraLAX and Metamucil which did not help symptoms.  He was started on Linzess which did improve bowel movements but he continued to have abdominal pain despite it.  He stopped taking Linzess since his bowel movements returned to normal.  Currently still having constipation.  Having heartburn for a few years  and it is worsening become daily symptoms.  Weight is stable.  Patient's sister had colon cancer diagnosed 10 years ago and had colon resection and currently has colostomy.  Patient's last colonoscopy was in September 2023 the note of which was reviewed by me today.  2 polyps were found during the colonoscopy and removed.  Patient denies any NSAID or alcohol intake.    Patient had labs done last month which showed blood count was normal.  Patient had labs done in January 2024 which were also reviewed by me today and showed normal TSH, creatinine and LFTs.      REVIEW OF SYSTEMS IS OTHERWISE NEGATIVE.      Historical Information   Past Medical History:   Diagnosis Date    Anxiety     Bipolar disorder (HCC)     Chronic back pain     with left sided sciatica    Constipation     Fibromyalgia, primary     Hemorrhoids     last assessed - 09Aug2012    Hyperlipidemia      Past Surgical History:   Procedure Laterality Date    CERVICAL FUSION      Cervical Vertbral fusion    WI EXC VARICOCELE/LIGATION SPERMATIC VEINS SPX Left 8/27/2019    Procedure: VARICOCELECTOMY;  Surgeon: Brayan Grady MD;  [facility];  Service: Urology    WI TRURL ELECTROSURG RESCJ PROSTATE BLEED COMPLETE N/A 11/17/2020    Procedure: TRANSURETHRAL VAPORIZATION OF PROSTATE (TURP);  Surgeon: Brayan Grady MD;  [facility];  Service: Urology     Social History   Social History     Substance and Sexual Activity   Alcohol Use No     Social History     Substance and Sexual Activity   Drug Use No     Social History     Tobacco Use   Smoking Status Never   Smokeless Tobacco Never     Family History   Problem Relation Age of Onset    Arthritis Mother     Hyperlipidemia Mother     Hyperthyroidism Father     Other Father         Prostate disease    Colon cancer Sister 44    Other Sister         Epilepsy    Asthma Brother        Meds/Allergies       Current Outpatient Medications:     acetaminophen (Tylenol) 325 mg tablet     "cyanocobalamin 1,000 mcg/mL    mupirocin (BACTROBAN) 2 % ointment    NEEDLE, DISP, 25 G 25G X 1\" MISC    senna-docusate sodium (SENOKOT-S) 8.6-50 mg per tablet    tiZANidine (ZANAFLEX) 4 mg tablet    lamoTRIgine (LaMICtal) 25 mg tablet    polyethylene glycol (MIRALAX) 17 g packet    psyllium (METAMUCIL SMOOTH TEXTURE) 28 % packet    QUEtiapine (SEROquel) 300 mg tablet    No Known Allergies        Objective     Blood pressure 124/82, temperature 98 °F (36.7 °C), height 5' 3\" (1.6 m), weight 60.6 kg (133 lb 9.6 oz). Body mass index is 23.67 kg/m².      PHYSICAL EXAM:      General Appearance:   Alert, cooperative, no distress   HEENT:   Normocephalic, atraumatic, anicteric.     Neck:  Supple, symmetrical, trachea midline   Lungs:   Clear to auscultation bilaterally; no rales, rhonchi or wheezing; respirations unlabored    Heart::   Regular rate and rhythm; no murmur, rub, or gallop.   Abdomen:   Soft, non-tender, non-distended; normal bowel sounds; no masses, no organomegaly    Genitalia:   Deferred    Rectal:   Deferred    Extremities:  No cyanosis, clubbing or edema    Pulses:  2+ and symmetric    Skin:  No jaundice, rashes, or lesions    Lymph nodes:  No palpable cervical lymphadenopathy        Lab Results:   No visits with results within 1 Day(s) from this visit.   Latest known visit with results is:   Appointment on 04/18/2024   Component Date Value    Intrinsic Factor 04/18/2024 2.4 (H)     Vitamin B-12 04/18/2024 1,572 (H)     WBC 04/18/2024 5.19     RBC 04/18/2024 5.22     Hemoglobin 04/18/2024 14.6     Hematocrit 04/18/2024 45.4     MCV 04/18/2024 87     MCH 04/18/2024 28.0     MCHC 04/18/2024 32.2     RDW 04/18/2024 12.0     MPV 04/18/2024 10.0     Platelets 04/18/2024 236     nRBC 04/18/2024 0     Segmented % 04/18/2024 57     Immature Grans % 04/18/2024 0     Lymphocytes % 04/18/2024 30     Monocytes % 04/18/2024 11     Eosinophils Relative 04/18/2024 2     Basophils Relative 04/18/2024 0     Absolute " Neutrophils 04/18/2024 2.94     Absolute Immature Grans 04/18/2024 0.01     Absolute Lymphocytes 04/18/2024 1.54     Absolute Monocytes 04/18/2024 0.59     Eosinophils Absolute 04/18/2024 0.09     Basophils Absolute 04/18/2024 0.02     Folate 04/18/2024 19.0     H pylori Ag, Stl 04/18/2024 Negative          Radiology Results:   XR thumb right first digit-min 2v    Result Date: 4/24/2024  Narrative: RIGHT FINGER INDICATION:   Pain in right hand. Pain in left hand. COMPARISON:  None. VIEWS:  XR THUMB RIGHT FIRST DIGIT-MIN 2V Images: 3 For the purposes of institution wide universal language the following terms will apply: (thumb=1st digit/finger, index finger=2nd digit/finger, long finger=3rd digit/finger, ring=4th digit/finger and small finger=5th digit/finger) FINDINGS: There is no acute fracture or dislocation. No significant degenerative changes. No lytic or blastic osseous lesion. Soft tissues are unremarkable.     Impression: No acute osseous abnormality. Electronically signed: 04/24/2024 12:38 PM Uriah Sanford MPH,MD    XR thumb left first digit-min 2v    Result Date: 4/24/2024  Narrative: LEFT FINGER INDICATION:   Pain in right hand. Pain in left hand. COMPARISON:  None. VIEWS:  XR THUMB LEFT FIRST DIGIT-MIN 2V Images: 3 For the purposes of institution wide universal language the following terms will apply: (thumb=1st digit/finger, index finger=2nd digit/finger, long finger=3rd digit/finger, ring=4th digit/finger and small finger=5th digit/finger) FINDINGS: There is no acute fracture or dislocation. No significant degenerative changes. No lytic or blastic osseous lesion. Soft tissues are unremarkable.     Impression: No acute osseous abnormality. Electronically signed: 04/24/2024 12:35 PM Uriah Sanford MPH,MD   Answers submitted by the patient for this visit:  Abdominal Pain Questionnaire (Submitted on 4/25/2024)  Chief Complaint: Abdominal pain  Chronicity: new  Onset: more than 1 month ago  Onset quality:  sudden  Frequency: intermittently  Episode duration: 2 Hours  Progression since onset: waxing and waning  Pain location: LLQ  Pain - numeric: 7/10  Pain quality: aching, a sensation of fullness, sharp  Radiates to: LLQ, left flank, perineum  arthralgias: Yes  belching: No  constipation: Yes  diarrhea: No  dysuria: No  fever: No  flatus: Yes  frequency: Yes  headaches: No  hematochezia: Yes  hematuria: No  melena: Yes  myalgias: Yes  nausea: No  weight loss: No  vomiting: No  Aggravated by: bowel movement, certain positions  Relieved by: bowel movements  Diagnostic workup: lower endoscopy

## 2024-05-03 RX ORDER — SODIUM CHLORIDE 9 MG/ML
125 INJECTION, SOLUTION INTRAVENOUS CONTINUOUS
Status: CANCELLED | OUTPATIENT
Start: 2024-05-03

## 2024-05-04 ENCOUNTER — HOSPITAL ENCOUNTER (OUTPATIENT)
Dept: RADIOLOGY | Facility: HOSPITAL | Age: 49
Discharge: HOME | End: 2024-05-04
Attending: ORTHOPAEDIC SURGERY
Payer: MEDICARE

## 2024-05-04 DIAGNOSIS — S63.641A RUPTURE OF ULNAR COLLATERAL LIGAMENT OF RIGHT THUMB, INITIAL ENCOUNTER: ICD-10-CM

## 2024-05-04 DIAGNOSIS — M79.642 BILATERAL HAND PAIN: ICD-10-CM

## 2024-05-04 DIAGNOSIS — M79.641 BILATERAL HAND PAIN: ICD-10-CM

## 2024-05-04 DIAGNOSIS — S63.642A RUPTURE OF ULNAR COLLATERAL LIGAMENT OF LEFT THUMB, INITIAL ENCOUNTER: ICD-10-CM

## 2024-05-04 PROCEDURE — 73218 MRI UPPER EXTREMITY W/O DYE: CPT

## 2024-05-06 ENCOUNTER — ANESTHESIA (OUTPATIENT)
Dept: GASTROENTEROLOGY | Facility: HOSPITAL | Age: 49
End: 2024-05-06

## 2024-05-06 ENCOUNTER — HOSPITAL ENCOUNTER (OUTPATIENT)
Dept: GASTROENTEROLOGY | Facility: HOSPITAL | Age: 49
Setting detail: OUTPATIENT SURGERY
Discharge: HOME | End: 2024-05-06
Attending: INTERNAL MEDICINE
Payer: MEDICARE

## 2024-05-06 ENCOUNTER — ANESTHESIA EVENT (OUTPATIENT)
Dept: GASTROENTEROLOGY | Facility: HOSPITAL | Age: 49
End: 2024-05-06

## 2024-05-06 VITALS
WEIGHT: 133 LBS | DIASTOLIC BLOOD PRESSURE: 69 MMHG | BODY MASS INDEX: 23.57 KG/M2 | TEMPERATURE: 98.2 F | SYSTOLIC BLOOD PRESSURE: 111 MMHG | RESPIRATION RATE: 16 BRPM | HEIGHT: 63 IN | OXYGEN SATURATION: 98 % | HEART RATE: 58 BPM

## 2024-05-06 DIAGNOSIS — K21.9 CHRONIC GERD: ICD-10-CM

## 2024-05-06 DIAGNOSIS — R10.84 GENERALIZED ABDOMINAL PAIN: ICD-10-CM

## 2024-05-06 DIAGNOSIS — K59.09 CHRONIC CONSTIPATION: ICD-10-CM

## 2024-05-06 DIAGNOSIS — R14.0 BLOATING: ICD-10-CM

## 2024-05-06 PROCEDURE — 88342 IMHCHEM/IMCYTCHM 1ST ANTB: CPT | Performed by: PATHOLOGY

## 2024-05-06 PROCEDURE — 43239 EGD BIOPSY SINGLE/MULTIPLE: CPT | Performed by: INTERNAL MEDICINE

## 2024-05-06 PROCEDURE — 88305 TISSUE EXAM BY PATHOLOGIST: CPT | Performed by: PATHOLOGY

## 2024-05-06 RX ORDER — OMEPRAZOLE 40 MG/1
40 CAPSULE, DELAYED RELEASE ORAL DAILY
Qty: 30 CAPSULE | Refills: 5 | Status: SHIPPED | OUTPATIENT
Start: 2024-05-06 | End: 2024-11-02

## 2024-05-06 RX ORDER — LIDOCAINE HYDROCHLORIDE 20 MG/ML
INJECTION, SOLUTION EPIDURAL; INFILTRATION; INTRACAUDAL; PERINEURAL AS NEEDED
Status: DISCONTINUED | OUTPATIENT
Start: 2024-05-06 | End: 2024-05-06

## 2024-05-06 RX ORDER — PROPOFOL 10 MG/ML
INJECTION, EMULSION INTRAVENOUS AS NEEDED
Status: DISCONTINUED | OUTPATIENT
Start: 2024-05-06 | End: 2024-05-06

## 2024-05-06 RX ORDER — SODIUM CHLORIDE 9 MG/ML
125 INJECTION, SOLUTION INTRAVENOUS CONTINUOUS
Status: DISCONTINUED | OUTPATIENT
Start: 2024-05-06 | End: 2024-05-10 | Stop reason: HOSPADM

## 2024-05-06 RX ADMIN — PROPOFOL 50 MG: 10 INJECTION, EMULSION INTRAVENOUS at 11:30

## 2024-05-06 RX ADMIN — PROPOFOL 100 MG: 10 INJECTION, EMULSION INTRAVENOUS at 11:26

## 2024-05-06 RX ADMIN — PROPOFOL 50 MG: 10 INJECTION, EMULSION INTRAVENOUS at 11:28

## 2024-05-06 RX ADMIN — SODIUM CHLORIDE 125 ML/HR: 0.9 INJECTION, SOLUTION INTRAVENOUS at 11:09

## 2024-05-06 RX ADMIN — LIDOCAINE HYDROCHLORIDE 100 MG: 20 INJECTION, SOLUTION EPIDURAL; INFILTRATION; INTRACAUDAL; PERINEURAL at 11:26

## 2024-05-06 RX ADMIN — PROPOFOL 50 MG: 10 INJECTION, EMULSION INTRAVENOUS at 11:27

## 2024-05-06 NOTE — ANESTHESIA POSTPROCEDURE EVALUATION
"Post-Op Assessment Note    CV Status:  Stable    Pain management: adequate       Mental Status:  Alert and awake   Hydration Status:  Euvolemic   PONV Controlled:  Controlled   Airway Patency:  Patent     Post Op Vitals Reviewed: Yes    No anethesia notable event occurred.    Staff: Anesthesiologist           /69   Pulse 58   Temp 98.2 °F (36.8 °C) (Temporal)   Resp 16   Ht 5' 3\" (1.6 m)   Wt 60.3 kg (133 lb)   SpO2 98%   BMI 23.56 kg/m²      BP      Temp      Pulse     Resp      SpO2        "

## 2024-05-06 NOTE — ANESTHESIA PREPROCEDURE EVALUATION
Procedure:  EGD    Relevant Problems   ANESTHESIA (within normal limits)      CARDIO   (+) Pure hypercholesterolemia      /RENAL   (+) Benign localized prostatic hyperplasia with lower urinary tract symptoms (LUTS)      HEMATOLOGY   (+) Pernicious anemia      MUSCULOSKELETAL   (+) Chronic bilateral low back pain with left-sided sciatica   (+) Fibromyalgia      NEURO/PSYCH   (+) Chronic bilateral low back pain with left-sided sciatica   (+) Chronic neck pain   (+) Chronic pain of both shoulders   (+) Depression with anxiety   (+) Fibromyalgia      PULMONARY (within normal limits)        Physical Exam    Airway    Mallampati score: I  TM Distance: >3 FB  Neck ROM: full     Dental   No notable dental hx     Cardiovascular  Cardiovascular exam normal    Pulmonary  Pulmonary exam normal     Other Findings        Anesthesia Plan  ASA Score- 2     Anesthesia Type- IV sedation with anesthesia with ASA Monitors.         Additional Monitors:     Airway Plan:            Plan Factors-    Chart reviewed.    Patient summary reviewed.                  Induction- intravenous.    Postoperative Plan-     Informed Consent- Anesthetic plan and risks discussed with patient.

## 2024-05-06 NOTE — H&P
History and Physical - [facility]  Bill Serrano 48 y.o. male MRN: [id number]                  HPI: Bill Serrano is a 48 y.o. year old male who presents for EGD to investigate abdominal pain, bloating, constipation and for Duggan's screening.      REVIEW OF SYSTEMS: Per the HPI, and otherwise unremarkable.    Historical Information   Past Medical History:   Diagnosis Date    Anxiety     Bipolar disorder (HCC)     Chronic back pain     with left sided sciatica    Constipation     Fibromyalgia, primary     Hemorrhoids     last assessed - 09Aug2012    Hyperlipidemia      Past Surgical History:   Procedure Laterality Date    CERVICAL FUSION      Cervical Vertbral fusion    MT EXC VARICOCELE/LIGATION SPERMATIC VEINS SPX Left 8/27/2019    Procedure: VARICOCELECTOMY;  Surgeon: Brayan Grady MD;  [facility];  Service: Urology    MT TRURL ELECTROSURG RESCJ PROSTATE BLEED COMPLETE N/A 11/17/2020    Procedure: TRANSURETHRAL VAPORIZATION OF PROSTATE (TURP);  Surgeon: Brayan Grady MD;  [facility];  Service: Urology     Social History   Social History     Substance and Sexual Activity   Alcohol Use No     Social History     Substance and Sexual Activity   Drug Use No     Social History     Tobacco Use   Smoking Status Never   Smokeless Tobacco Never     Family History   Problem Relation Age of Onset    Arthritis Mother     Hyperlipidemia Mother     Hyperthyroidism Father     Other Father         Prostate disease    Colon cancer Sister 44    Other Sister         Epilepsy    Asthma Brother        Meds/Allergies     (Not in a hospital admission)      No Known Allergies    Objective     There were no vitals taken for this visit.      PHYSICAL EXAM    Gen: NAD  CV: RRR  CHEST: Clear  ABD: soft, NT/ND  EXT: no edema      ASSESSMENT/PLAN:   Bill Serrano is a 48 y.o. year old male who presents for EGD to investigate abdominal pain, bloating, constipation and  for Duggan's screening. The patient is stable and optimized for the procedure, we reviewed risk and benefits. Risk include but not limited to infection, bleeding, perforation and missing a lesion.

## 2024-05-10 PROCEDURE — 88305 TISSUE EXAM BY PATHOLOGIST: CPT | Performed by: PATHOLOGY

## 2024-05-10 PROCEDURE — 88342 IMHCHEM/IMCYTCHM 1ST ANTB: CPT | Performed by: PATHOLOGY

## 2024-05-15 ENCOUNTER — OFFICE VISIT (OUTPATIENT)
Dept: OCCUPATIONAL THERAPY | Facility: HOSPITAL | Age: 49
End: 2024-05-15
Payer: MEDICARE

## 2024-05-15 ENCOUNTER — OFFICE VISIT (OUTPATIENT)
Dept: OBGYN CLINIC | Facility: HOSPITAL | Age: 49
End: 2024-05-15
Payer: MEDICARE

## 2024-05-15 VITALS
DIASTOLIC BLOOD PRESSURE: 73 MMHG | HEIGHT: 63 IN | HEART RATE: 62 BPM | SYSTOLIC BLOOD PRESSURE: 123 MMHG | WEIGHT: 133.6 LBS | BODY MASS INDEX: 23.67 KG/M2

## 2024-05-15 DIAGNOSIS — S63.641A RUPTURE OF ULNAR COLLATERAL LIGAMENT OF RIGHT THUMB, INITIAL ENCOUNTER: ICD-10-CM

## 2024-05-15 DIAGNOSIS — S63.642A RUPTURE OF ULNAR COLLATERAL LIGAMENT OF LEFT THUMB, INITIAL ENCOUNTER: Primary | ICD-10-CM

## 2024-05-15 DIAGNOSIS — S63.642D RUPTURE OF ULNAR COLLATERAL LIGAMENT OF LEFT THUMB, SUBSEQUENT ENCOUNTER: Primary | ICD-10-CM

## 2024-05-15 PROCEDURE — 99214 OFFICE O/P EST MOD 30 MIN: CPT | Performed by: ORTHOPAEDIC SURGERY

## 2024-05-15 PROCEDURE — L3913 HFO W/O JOINTS CF: HCPCS

## 2024-05-15 NOTE — PROGRESS NOTES
OT Splint    Diagnosis:   1. Rupture of ulnar collateral ligament of left thumb, subsequent encounter           Indication: L thumb UCL injury    Location: Left  hand and thumb  Supplies: Orthotics Thermoplastic material    Splint type: HFO static- hand-based thumb spica (skier's splint)  Wearing Schedule: Remove for hygiene only  Describe Position: Thumb in position of functional opposition, IP joint free    Precautions: no forceful pinching/lifting    Patient or Caregiver expresses understanding of wearing Schedule and Precautions? Yes  Patient or Caregiver able to don/doff orthotic independently?Yes    Written orders provided to patient? Yes  Orders Obtained: Written  Orders Obtained from: Dr. Menchaca

## 2024-05-15 NOTE — PROGRESS NOTES
ASSESSMENT/PLAN:    Assessment:   Bilateral thumb UCL chronic sprain  Left > Right     Plan:   MRIs shows chronic sprains which do not require surgical intervention  Recommend resting the thumbs in a brace for 8 weeks followed by hand therapy after, which patient declined.  Patient will see hand therapy for HEP and left skier's splint     Follow Up:  PRN    _____________________________________________________  CHIEF COMPLAINT:  Chief Complaint   Patient presents with    Left Hand - Pain     UCL Injury  MRI - 5/4/24    Right Hand - Pain     UCL Injury  MRI - 5/4/24         SUBJECTIVE:  Bill Serrano is a 48 y.o. male who presents for follow up to discuss MRIs and treatment options for his bilateral thumbs.  Patient has had bilateral thumb pain for and undetermined amount of time.  Given his laxity on exam at the last visit there was a concern for UCL injury.  We discussed hand therapy for strengthening vs surgery at the last visit.  He reports bilateral thumb MP pain.  Pain is worse with gripping and activities.  Denies N/T, catching, or clicking     PAST MEDICAL HISTORY:  Past Medical History:   Diagnosis Date    Anxiety     Bipolar disorder (HCC)     Chronic back pain     with left sided sciatica    Constipation     Fibromyalgia, primary     Hemorrhoids     last assessed - 09Aug2012    Hyperlipidemia        PAST SURGICAL HISTORY:  Past Surgical History:   Procedure Laterality Date    CERVICAL FUSION      Cervical Vertbral fusion    AZ EXC VARICOCELE/LIGATION SPERMATIC VEINS SPX Left 8/27/2019    Procedure: VARICOCELECTOMY;  Surgeon: Brayan Grady MD;  Location: [facility];  Service: Urology    AZ TRURL ELECTROSURG RESCJ PROSTATE BLEED COMPLETE N/A 11/17/2020    Procedure: TRANSURETHRAL VAPORIZATION OF PROSTATE (TURP);  Surgeon: Brayan Grady MD;  Location: [facility];  Service: Urology       FAMILY HISTORY:  Family History   Problem Relation Age of Onset    Arthritis Mother     Hyperlipidemia  "Mother     Hyperthyroidism Father     Other Father         Prostate disease    Colon cancer Sister 44    Other Sister         Epilepsy    Asthma Brother        SOCIAL HISTORY:  Social History     Tobacco Use    Smoking status: Never    Smokeless tobacco: Never   Vaping Use    Vaping status: Never Used   Substance Use Topics    Alcohol use: No    Drug use: No       MEDICATIONS:    Current Outpatient Medications:     acetaminophen (Tylenol) 325 mg tablet, Take 650 mg by mouth every 6 (six) hours as needed for mild pain, Disp: , Rfl:     cyanocobalamin 1,000 mcg/mL, Inject 1 mL (1,000 mcg total) into a muscle every 30 (thirty) days, Disp: 3 mL, Rfl: 3    NEEDLE, DISP, 25 G 25G X 1\" MISC, Use once a week, Disp: 100 each, Rfl: 2    omeprazole (PriLOSEC) 40 MG capsule, Take 1 capsule (40 mg total) by mouth daily, Disp: 30 capsule, Rfl: 5    tiZANidine (ZANAFLEX) 4 mg tablet, Take 1 tablet (4 mg total) by mouth every 8 (eight) hours as needed for muscle spasms, Disp: 90 tablet, Rfl: 2    lamoTRIgine (LaMICtal) 25 mg tablet, Take 2 tablets (50 mg total) by mouth in the morning, Disp: 60 tablet, Rfl: 2    linaCLOtide (Linzess) 72 MCG CAPS, Take 72 mcg by mouth in the morning (Patient not taking: Reported on 5/15/2024), Disp: 30 capsule, Rfl: 5    mupirocin (BACTROBAN) 2 % ointment, Apply topically 2 (two) times a day for 7 days, Disp: 50 g, Rfl: 0    polyethylene glycol (MIRALAX) 17 g packet, Take 17 g by mouth daily, Disp: 510 g, Rfl: 0    psyllium (METAMUCIL SMOOTH TEXTURE) 28 % packet, Take 1 packet by mouth 2 (two) times a day, Disp: 180 packet, Rfl: 0    QUEtiapine (SEROquel) 300 mg tablet, Take 1 tablet (300 mg total) by mouth daily at bedtime, Disp: 90 tablet, Rfl: 0    senna-docusate sodium (SENOKOT-S) 8.6-50 mg per tablet, Take 2 tablets by mouth 2 (two) times a day (Patient not taking: Reported on 5/15/2024), Disp: , Rfl:     ALLERGIES:  No Known Allergies    REVIEW OF SYSTEMS:  Pertinent items are noted in HPI.  A " "comprehensive review of systems was negative.    LABS:  HgA1c:   Lab Results   Component Value Date    HGBA1C 5.6 01/08/2024     BMP:   Lab Results   Component Value Date    CALCIUM 9.9 01/08/2024    K 4.1 01/08/2024    CO2 31 01/08/2024     01/08/2024    BUN 21 01/08/2024    CREATININE 1.10 01/08/2024           _____________________________________________________  PHYSICAL EXAMINATION:  Vital signs: /73   Pulse 62   Ht 5' 3\" (1.6 m)   Wt 60.6 kg (133 lb 9.6 oz)   BMI 23.67 kg/m²   General: well developed and well nourished, alert, oriented times 3, and appears comfortable  Psychiatric: Normal  HEENT: Trachea Midline, No torticollis  Cardiovascular: No discernable arrhythmia  Pulmonary: No wheezing or stridor  Abdomen: No rebound or guarding  Extremities: No peripheral edema  Skin: No masses, erythema, lacerations, fluctation, ulcerations  Neurovascular: Sensation Intact to the Median, Ulnar, Radial Nerve, Motor Intact to the Median, Ulnar, Radial Nerve, and Pulses Intact    MUSCULOSKELETAL EXAMINATION:  Bilateral thumbs:  Negative tinel's  Bilateral thumbs:  Positive palpable nodule over the A1 pulley.  Positive tenderness to palpation over A1 pulley. Negative catching. Negative clicking.     Laxity UCL right 50-60 degrees, left 40-50 degrees    _____________________________________________________  STUDIES REVIEWED:  Images were reviewed in PACS by Dr. Menchaca and demonstrate:   MRI Left thumb demonstrates sprain of the thumb metacarpophalangeal UCL, without a discrete tear.  MRI right thumb demonstrates partial tear at the distal attachment of the thumb metacarpophalangeal UCL       PROCEDURES PERFORMED:  Procedures  No Procedures performed today  Scribe Attestation      I,:  Teresa Rios am acting as a scribe while in the presence of the attending physician.:       I,:  Gibson Menchaca MD personally performed the services described in this documentation    as scribed in my presence.:       "

## 2024-06-19 ENCOUNTER — OFFICE VISIT (OUTPATIENT)
Dept: INTERNAL MEDICINE CLINIC | Facility: CLINIC | Age: 49
End: 2024-06-19

## 2024-06-19 VITALS
TEMPERATURE: 98 F | WEIGHT: 134.2 LBS | BODY MASS INDEX: 23.77 KG/M2 | OXYGEN SATURATION: 98 % | DIASTOLIC BLOOD PRESSURE: 71 MMHG | HEART RATE: 54 BPM | SYSTOLIC BLOOD PRESSURE: 115 MMHG

## 2024-06-19 DIAGNOSIS — D51.0 PERNICIOUS ANEMIA: ICD-10-CM

## 2024-06-19 DIAGNOSIS — L57.0 KERATOSIS: ICD-10-CM

## 2024-06-19 DIAGNOSIS — R00.2 PALPITATIONS: ICD-10-CM

## 2024-06-19 DIAGNOSIS — H53.8 BLURRY VISION: ICD-10-CM

## 2024-06-19 DIAGNOSIS — L80 VITILIGO: ICD-10-CM

## 2024-06-19 DIAGNOSIS — K59.04 CHRONIC IDIOPATHIC CONSTIPATION: Chronic | ICD-10-CM

## 2024-06-19 DIAGNOSIS — Z00.00 MEDICARE ANNUAL WELLNESS VISIT, SUBSEQUENT: Primary | ICD-10-CM

## 2024-06-19 PROCEDURE — 93000 ELECTROCARDIOGRAM COMPLETE: CPT | Performed by: INTERNAL MEDICINE

## 2024-06-19 PROCEDURE — G0439 PPPS, SUBSEQ VISIT: HCPCS | Performed by: INTERNAL MEDICINE

## 2024-06-19 RX ORDER — SENNA AND DOCUSATE SODIUM 50; 8.6 MG/1; MG/1
2 TABLET, FILM COATED ORAL EVERY 12 HOURS PRN
Start: 2024-06-19

## 2024-06-19 NOTE — PROGRESS NOTES
Ambulatory Visit  Name: Bill Serrano      : 1975      MRN: [id number]  Encounter Provider: Lalita Presley DO  Encounter Date: 2024   Encounter department: [facility]    Assessment & Plan   1. Medicare annual wellness visit, subsequent  Colonoscopy in 2023 showed 2 subcentimeter polyps.  Pathology was tubular adenoma.    Repeat colonoscopy in     2. Chronic idiopathic constipation  Seen by GI on 24.  Discontinued Linzess due to cost.  Taking Senokot as needed.    Continue pantoprazole 40 mg per GI recommendations  Continue senokot as needed    -     senna-docusate sodium (SENOKOT-S) 8.6-50 mg per tablet; Take 2 tablets by mouth every 12 (twelve) hours as needed for constipation    3. Palpitations  Endorses intermittent palpitations that last for a few minutes while lying bed.  Also has dizziness (that feels like passing out) that occurs independently of palpitations.  Not associated with headaches or tinnitus.  Exam unrevealing including negative [place]-Hallpike.  POC EKG showed sinus bradycardia with rate 54.    Continue to monitor for now  If symptoms persisting or worsening, consider Holter monitor    -     POCT ECG  4. Keratosis  Has small, white, pruritic keratotic lesions on left forearm and small, flat, white, non-pruritic [place] on left calf for the last 6 weeks.  See media for pictures.    Referral to dermatology for possible biopsy and underlying vitiligo    -     Ambulatory Referral to Dermatology; Future  5. Vitiligo  -     Ambulatory Referral to Dermatology; Future    6. Pernicious anemia  B12 deficiency with elevated IF antibody 2.4.  B12 level 1572 in 2024 after starting injections.  Administers B12 injections at home.  Underwent EGD on 24 with pathology showing chronic inactive gastritis.    Continue B12 injections indefinitely    7. Blurry vision  Still has blurry vision every day, intermittently.  No eye pain.  Vision  is blurry up close and at distance.  Tried to wear reading glasses but doesn't help.  Previously had prescription glasses a few years ago which helped but now doesn't help.     Patient knows he must schedule an appointment with ophthalmology  Referral to Dr. Schmidt and contact information for [facility] given at last appointment in April        Preventive health issues were discussed with patient, and age appropriate screening tests were ordered as noted in patient's After Visit Summary. Personalized health advice and appropriate referrals for health education or preventive services given if needed, as noted in patient's After Visit Summary.    History of Present Illness     49 y/o male with history of BPD, cervical and lumbar radiculopathy s/p C5-6 fusion, pernicious anemia, BPH s/p TURP, constipation.  Endorsees skin lesions on left arm.  Presents since the last office visit about 6 weeks aog.  Initially look edlike warts and are itchy.  Now are rough, white patches.  Notes a small, small, white, non-pruritic, flat [place] on the back of left calf that for the last few weeks.    Seen by GI and had EGD recently.  Had to stop Linzess due to cost.  Restarted Senokot as needed.  Constipation is resolved for now.    Chronic pain is well controlled.  Taking Zanaflex as needed.      Still has blurry vision every day, intermittently.  No eye pain.  Vision is blurry up close and at distance.  Tried to wear reading glasses but doesn't help.  Previously had prescription glasses a few years ago which helped but now doesn't help.  He has to schedule ophthalmology appointment.  No headaches or tinnitus.    Has dizziness like feeling he will pass out.  Intermittent and sometimes happens with activity.  Not associated with palpitations or head movement.  Sometimes has palpitations if he lies in bed which last minutes but does not occur with dizziness.         Patient Care Team:  Lalita Presley DO as PCP - General  (Internal Medicine)    Review of Systems   Eyes:  Positive for visual disturbance. Negative for pain.   Respiratory:  Negative for shortness of breath.    Cardiovascular:  Positive for palpitations. Negative for chest pain.   Gastrointestinal:  Negative for abdominal pain, constipation and nausea.   Genitourinary:  Negative for difficulty urinating and dysuria.   Musculoskeletal:  Positive for back pain.   Skin:  Positive for color change.   Neurological:  Positive for dizziness. Negative for weakness.     Medical History Reviewed by provider this encounter:       Annual Wellness Visit Questionnaire   Bill is here for his Subsequent Wellness visit.     Health Risk Assessment:   Patient rates overall health as fair. Patient feels that their physical health rating is slightly worse. Patient is dissatisfied with their life. Eyesight was rated as much worse. Hearing was rated as same. Patient feels that their emotional and mental health rating is slightly worse. Patients states they are often angry. Patient states they are often unusually tired/fatigued. Pain experienced in the last 7 days has been a lot. Patient's pain rating has been 6/10. Patient states that he has experienced no weight loss or gain in last 6 months.     Depression Screening:   PHQ-2 Score: 3  PHQ-9 Score: 19      Fall Risk Screening:   In the past year, patient has experienced: no history of falling in past year      Home Safety:  Patient does not have trouble with stairs inside or outside of their home. Patient has working smoke alarms and has working carbon monoxide detector. Home safety hazards include: none.     Nutrition:   Current diet is Regular.     Medications:   Patient is currently taking over-the-counter supplements. OTC medications include: see medication list. Patient is able to manage medications.     Activities of Daily Living (ADLs)/Instrumental Activities of Daily Living (IADLs):   Walk and transfer into and out of bed and  chair?: Yes  Dress and groom yourself?: Yes    Bathe or shower yourself?: Yes    Feed yourself? Yes  Do your laundry/housekeeping?: Yes  Manage your money, pay your bills and track your expenses?: No  Make your own meals?: No    Do your own shopping?: No    Previous Hospitalizations:   Any hospitalizations or ED visits within the last 12 months?: No      Advance Care Planning:   Living will: No    Durable POA for healthcare: No    Advanced directive: No      PREVENTIVE SCREENINGS      Cardiovascular Screening:    General: Screening Not Indicated and History Lipid Disorder      Diabetes Screening:     General: Screening Current      Colorectal Cancer Screening:     General: Screening Current      Prostate Cancer Screening:    General: Screening Not Indicated      Lung Cancer Screening:     General: Screening Not Indicated      Hepatitis C Screening:    General: Screening Current    Screening, Brief Intervention, and Referral to Treatment (SBIRT)    Screening  Typical number of drinks in a day: 0  Typical number of drinks in a week: 0  Interpretation: Low risk drinking behavior.    AUDIT-C Screenin) How often did you have a drink containing alcohol in the past year? never  2) How many drinks did you have on a typical day when you were drinking in the past year? 0  3) How often did you have 6 or more drinks on one occasion in the past year? never    AUDIT-C Score: 0  Interpretation: Score 0-3 (male): Negative screen for alcohol misuse    Single Item Drug Screening:  How often have you used an illegal drug (including marijuana) or a prescription medication for non-medical reasons in the past year? never    Single Item Drug Screen Score: 0  Interpretation: Negative screen for possible drug use disorder    Review of Current Opioid Use    Opioid Risk Tool (ORT) Interpretation: Complete Opioid Risk Tool (ORT)    Social Determinants of Health     Financial Resource Strain: High Risk (2024)    Overall Financial  Resource Strain (CARDIA)     Difficulty of Paying Living Expenses: Very hard   Food Insecurity: Food Insecurity Present (6/16/2024)    Hunger Vital Sign     Worried About Running Out of Food in the Last Year: Often true     Ran Out of Food in the Last Year: Often true   Transportation Needs: No Transportation Needs (6/16/2024)    PRAPARE - Transportation     Lack of Transportation (Medical): No     Lack of Transportation (Non-Medical): No   Housing Stability: Low Risk  (6/16/2024)    Housing Stability Vital Sign     Unable to Pay for Housing in the Last Year: No     Number of Times Moved in the Last Year: 0     Homeless in the Last Year: No   Utilities: Not At Risk (6/16/2024)    [facility] Utilities     Threatened with loss of utilities: No     No results found.    Objective     There were no vitals taken for this visit.    Physical Exam  Vitals reviewed.   Constitutional:       General: He is not in acute distress.  HENT:      Head: Normocephalic and atraumatic.      Nose: No rhinorrhea.   Eyes:      General: No scleral icterus.  Cardiovascular:      Rate and Rhythm: Normal rate and regular rhythm.      Pulses: Normal pulses.      Heart sounds: Normal heart sounds. No murmur heard.  Pulmonary:      Effort: Pulmonary effort is normal. No respiratory distress.      Breath sounds: Normal breath sounds. No wheezing, rhonchi or rales.   Musculoskeletal:      Right lower leg: No edema.      Left lower leg: No edema.   Skin:     General: Skin is warm and dry.      Coloration: Skin is not jaundiced.      Comments: White keratotic lesions on left forearm and small, flat, white, [place] on posterior calf (see media for pictures)   Neurological:      Mental Status: He is alert.      Cranial Nerves: No dysarthria.      Comments: CN 2-12 grossly intact, moves all 4 extremities, negative [place]-Hallpike   Psychiatric:         Mood and Affect: Mood normal.         Behavior: Behavior normal.       Administrative Statements

## 2024-06-19 NOTE — PATIENT INSTRUCTIONS
Medicare Preventive Visit Patient Instructions  Thank you for completing your Welcome to Medicare Visit or Medicare Annual Wellness Visit today. Your next wellness visit will be due in one year (6/20/2025).  The screening/preventive services that you may require over the next 5-10 years are detailed below. Some tests may not apply to you based off risk factors and/or age. Screening tests ordered at today's visit but not completed yet may show as past due. Also, please note that scanned in results may not display below.  Preventive Screenings:  Service Recommendations Previous Testing/Comments   Colorectal Cancer Screening  Colonoscopy    Fecal Occult Blood Test (FOBT)/Fecal Immunochemical Test (FIT)  Fecal DNA/Cologuard Test  Flexible Sigmoidoscopy Age: 45-75 years old   Colonoscopy: every 10 years (May be performed more frequently if at higher risk)  OR  FOBT/FIT: every 1 year  OR  Cologuard: every 3 years  OR  Sigmoidoscopy: every 5 years  Screening may be recommended earlier than age 45 if at higher risk for colorectal cancer. Also, an individualized decision between you and your healthcare provider will decide whether screening between the ages of 76-85 would be appropriate. Colonoscopy: 09/20/2023  FOBT/FIT: Not on file  Cologuard: Not on file  Sigmoidoscopy: Not on file    Screening Current     Prostate Cancer Screening Individualized decision between patient and health care provider in men between ages of 55-69   Medicare will cover every 12 months beginning on the day after your 50th birthday PSA: 0.9 ng/mL     Screening Not Indicated     Hepatitis C Screening Once for adults born between 1945 and 1965  More frequently in patients at high risk for Hepatitis C Hep C Antibody: 04/03/2023    Screening Current   Diabetes Screening 1-2 times per year if you're at risk for diabetes or have pre-diabetes Fasting glucose: 97 mg/dL (1/8/2024)  A1C: 5.6 % (1/8/2024)  Screening Current   Cholesterol Screening Once every  5 years if you don't have a lipid disorder. May order more often based on risk factors. Lipid panel: 01/08/2024  Screening Not Indicated  History Lipid Disorder      Other Preventive Screenings Covered by Medicare:  Abdominal Aortic Aneurysm (AAA) Screening: covered once if your at risk. You're considered to be at risk if you have a family history of AAA or a male between the age of 65-75 who smoking at least 100 cigarettes in your lifetime.  Lung Cancer Screening: covers low dose CT scan once per year if you meet all of the following conditions: (1) Age 55-77; (2) No signs or symptoms of lung cancer; (3) Current smoker or have quit smoking within the last 15 years; (4) You have a tobacco smoking history of at least 20 pack years (packs per day x number of years you smoked); (5) You get a written order from a healthcare provider.  Glaucoma Screening: covered annually if you're considered high risk: (1) You have diabetes OR (2) Family history of glaucoma OR (3)  aged 50 and older OR (4)  American aged 65 and older  Osteoporosis Screening: covered every 2 years if you meet one of the following conditions: (1) Have a vertebral abnormality; (2) On glucocorticoid therapy for more than 3 months; (3) Have primary hyperparathyroidism; (4) On osteoporosis medications and need to assess response to drug therapy.  HIV Screening: covered annually if you're between the age of 15-65. Also covered annually if you are younger than 15 and older than 65 with risk factors for HIV infection. For pregnant patients, it is covered up to 3 times per pregnancy.    Immunizations:  Immunization Recommendations   Influenza Vaccine Annual influenza vaccination during flu season is recommended for all persons aged >= 6 months who do not have contraindications   Pneumococcal Vaccine   * Pneumococcal conjugate vaccine = PCV13 (Prevnar 13), PCV15 (Vaxneuvance), PCV20 (Prevnar 20)  * Pneumococcal polysaccharide vaccine =  PPSV23 (Pneumovax) Adults 19-63 yo with certain risk factors or if 65+ yo  If never received any pneumonia vaccine: recommend Prevnar 20 (PCV20)  Give PCV20 if previously received 1 dose of PCV13 or PPSV23   Hepatitis B Vaccine 3 dose series if at intermediate or high risk (ex: diabetes, end stage renal disease, liver disease)   Respiratory syncytial virus (RSV) Vaccine - COVERED BY MEDICARE PART D  * RSVPreF3 (Arexvy) CDC recommends that adults 60 years of age and older may receive a single dose of RSV vaccine using shared clinical decision-making (SCDM)   Tetanus (Td) Vaccine - COST NOT COVERED BY MEDICARE PART B Following completion of primary series, a booster dose should be given every 10 years to maintain immunity against tetanus. Td may also be given as tetanus wound prophylaxis.   Tdap Vaccine - COST NOT COVERED BY MEDICARE PART B Recommended at least once for all adults. For pregnant patients, recommended with each pregnancy.   Shingles Vaccine (Shingrix) - COST NOT COVERED BY MEDICARE PART B  2 shot series recommended in those 19 years and older who have or will have weakened immune systems or those 50 years and older     Health Maintenance Due:      Topic Date Due   • Colorectal Cancer Screening  09/18/2030   • HIV Screening  Completed   • Hepatitis C Screening  Completed     Immunizations Due:      Topic Date Due   • Influenza Vaccine (Season Ended) 09/01/2024     Advance Directives   What are advance directives?  Advance directives are legal documents that state your wishes and plans for medical care. These plans are made ahead of time in case you lose your ability to make decisions for yourself. Advance directives can apply to any medical decision, such as the treatments you want, and if you want to donate organs.   What are the types of advance directives?  There are many types of advance directives, and each state has rules about how to use them. You may choose a combination of any of the  following:  Living will:  This is a written record of the treatment you want. You can also choose which treatments you do not want, which to limit, and which to stop at a certain time. This includes surgery, medicine, IV fluid, and tube feedings.   Durable power of  for healthcare (DPAHC):  This is a written record that states who you want to make healthcare choices for you when you are unable to make them for yourself. This person, called a proxy, is usually a family member or a friend. You may choose more than 1 proxy.  Do not resuscitate (DNR) order:  A DNR order is used in case your heart stops beating or you stop breathing. It is a request not to have certain forms of treatment, such as CPR. A DNR order may be included in other types of advance directives.  Medical directive:  This covers the care that you want if you are in a coma, near death, or unable to make decisions for yourself. You can list the treatments you want for each condition. Treatment may include pain medicine, surgery, blood transfusions, dialysis, IV or tube feedings, and a ventilator (breathing machine).  Values history:  This document has questions about your views, beliefs, and how you feel and think about life. This information can help others choose the care that you would choose.  Why are advance directives important?  An advance directive helps you control your care. Although spoken wishes may be used, it is better to have your wishes written down. Spoken wishes can be misunderstood, or not followed. Treatments may be given even if you do not want them. An advance directive may make it easier for your family to make difficult choices about your care.   Narcotic (Opioid) Safety    Use narcotics safely:  Take prescribed narcotics exactly as directed  Do not give narcotics to others or take narcotics that belong to someone else  Do not mix narcotics without medicines or alcohol  Do not drive or operate heavy machinery after you  take the narcotic  Monitor for side effects and notify your healthcare provider if you experienced side effects such as nausea, sleepiness, itching, or trouble thinking clearly.    Manage constipation:    Constipation is the most common side effect of narcotic medicine. Constipation is when you have hard, dry bowel movements, or you go longer than usual between bowel movements. Tell your healthcare provider about all changes in your bowel movements while you are taking narcotics. He or she may recommend laxative medicine to help you have a bowel movement. He or she may also change the kind of narcotic you are taking, or change when you take it. The following are more ways you can prevent or relieve constipation:    Drink liquids as directed.  You may need to drink extra liquids to help soften and move your bowels. Ask how much liquid to drink each day and which liquids are best for you.  Eat high-fiber foods.  This may help decrease constipation by adding bulk to your bowel movements. High-fiber foods include fruits, vegetables, whole-grain breads and cereals, and beans. Your healthcare provider or dietitian can help you create a high-fiber meal plan. Your provider may also recommend a fiber supplement if you cannot get enough fiber from food.  Exercise regularly.  Regular physical activity can help stimulate your intestines. Walking is a good exercise to prevent or relieve constipation. Ask which exercises are best for you.  Schedule a time each day to have a bowel movement.  This may help train your body to have regular bowel movements. Bend forward while you are on the toilet to help move the bowel movement out. Sit on the toilet for at least 10 minutes, even if you do not have a bowel movement.    Store narcotics safely:   Store narcotics where others cannot easily get them.  Keep them in a locked cabinet or secure area. Do not  keep them in a purse or other bag you carry with you. A person may be looking for  something else and find the narcotics.  Make sure narcotics are stored out of the reach of children.  A child can easily overdose on narcotics. Narcotics may look like candy to a small child.    The best way to dispose of narcotics:      The laws vary by country and area. In the [place], the best way is to return the narcotics through a take-back program. This program is offered by the [place] Drug Enforcement Agency (FABIAN). The following are options for using the program:  Take the narcotics to a F[employer]IAN collection site.  The site is often a law enforcement center. Call your local law enforcement center for scheduled take-back days in your area. You will be given information on where to go if the collection site is in a different location.  Take the narcotics to an approved pharmacy or hospital.  A pharmacy or hospital may be set up as a collection site. You will need to ask if it is a [employer] collection site if you were not directed there. A pharmacy or doctor's office may not be able to take back narcotics unless it is a F[employer] site.  Use a mail-back system.  This means you are given containers to put the narcotics into. You will then mail them in the containers.  Use a take-back drop box.  This is a place to leave the narcotics at any time. People and animals will not be able to get into the box. Your local law enforcement agency can tell you where to find a drop box in your area.    Other ways to manage pain:   Ask your healthcare provider about non-narcotic medicines to control pain.  Nonprescription medicines include NSAIDs (such as ibuprofen) and acetaminophen. Prescription medicines include muscle relaxers, antidepressants, and steroids.  Pain may be managed without any medicines.  Some ways to relieve pain include massage, aromatherapy, or meditation. Physical or occupational therapy may also help.    For more information:   Drug Enforcement Administration  [address] [place] [address]  Phone: 1-  642 - 681-4957  Web Address: https://www.deadiversion.[place].gov/drug_disposal/    [place] Food and Drug Administration  [address] [place] [address]  Phone: 4- 136 - 319-5444  Web Address: http://www.fda.gov     © Copyright [employer] 2018 Information is for End User's use only and may not be sold, redistributed or otherwise used for commercial purposes. All illustrations and images included in CareNotes® are the copyrighted property of [employer] or [employer]    Medicare Preventive Visit Patient Instructions  Thank you for completing your Welcome to Medicare Visit or Medicare Annual Wellness Visit today. Your next wellness visit will be due in one year (6/20/2025).  The screening/preventive services that you may require over the next 5-10 years are detailed below. Some tests may not apply to you based off risk factors and/or age. Screening tests ordered at today's visit but not completed yet may show as past due. Also, please note that scanned in results may not display below.  Preventive Screenings:  Service Recommendations Previous Testing/Comments   Colorectal Cancer Screening  Colonoscopy    Fecal Occult Blood Test (FOBT)/Fecal Immunochemical Test (FIT)  Fecal DNA/Cologuard Test  Flexible Sigmoidoscopy Age: 45-75 years old   Colonoscopy: every 10 years (May be performed more frequently if at higher risk)  OR  FOBT/FIT: every 1 year  OR  Cologuard: every 3 years  OR  Sigmoidoscopy: every 5 years  Screening may be recommended earlier than age 45 if at higher risk for colorectal cancer. Also, an individualized decision between you and your healthcare provider will decide whether screening between the ages of 76-85 would be appropriate. Colonoscopy: 09/20/2023  FOBT/FIT: Not on file  Cologuard: Not on file  Sigmoidoscopy: Not on file    Screening Current     Prostate Cancer Screening Individualized decision between patient and health care provider in men between ages of 55-69    Medicare will cover every 12 months beginning on the day after your 50th birthday PSA: 0.9 ng/mL     Screening Not Indicated     Hepatitis C Screening Once for adults born between 1945 and 1965  More frequently in patients at high risk for Hepatitis C Hep C Antibody: 04/03/2023    Screening Current   Diabetes Screening 1-2 times per year if you're at risk for diabetes or have pre-diabetes Fasting glucose: 97 mg/dL (1/8/2024)  A1C: 5.6 % (1/8/2024)  Screening Current   Cholesterol Screening Once every 5 years if you don't have a lipid disorder. May order more often based on risk factors. Lipid panel: 01/08/2024  Screening Not Indicated  History Lipid Disorder      Other Preventive Screenings Covered by Medicare:  Abdominal Aortic Aneurysm (AAA) Screening: covered once if your at risk. You're considered to be at risk if you have a family history of AAA or a male between the age of 65-75 who smoking at least 100 cigarettes in your lifetime.  Lung Cancer Screening: covers low dose CT scan once per year if you meet all of the following conditions: (1) Age 55-77; (2) No signs or symptoms of lung cancer; (3) Current smoker or have quit smoking within the last 15 years; (4) You have a tobacco smoking history of at least 20 pack years (packs per day x number of years you smoked); (5) You get a written order from a healthcare provider.  Glaucoma Screening: covered annually if you're considered high risk: (1) You have diabetes OR (2) Family history of glaucoma OR (3)  aged 50 and older OR (4)  American aged 65 and older  Osteoporosis Screening: covered every 2 years if you meet one of the following conditions: (1) Have a vertebral abnormality; (2) On glucocorticoid therapy for more than 3 months; (3) Have primary hyperparathyroidism; (4) On osteoporosis medications and need to assess response to drug therapy.  HIV Screening: covered annually if you're between the age of 15-65. Also covered annually if  you are younger than 15 and older than 65 with risk factors for HIV infection. For pregnant patients, it is covered up to 3 times per pregnancy.    Immunizations:  Immunization Recommendations   Influenza Vaccine Annual influenza vaccination during flu season is recommended for all persons aged >= 6 months who do not have contraindications   Pneumococcal Vaccine   * Pneumococcal conjugate vaccine = PCV13 (Prevnar 13), PCV15 (Vaxneuvance), PCV20 (Prevnar 20)  * Pneumococcal polysaccharide vaccine = PPSV23 (Pneumovax) Adults 19-63 yo with certain risk factors or if 65+ yo  If never received any pneumonia vaccine: recommend Prevnar 20 (PCV20)  Give PCV20 if previously received 1 dose of PCV13 or PPSV23   Hepatitis B Vaccine 3 dose series if at intermediate or high risk (ex: diabetes, end stage renal disease, liver disease)   Respiratory syncytial virus (RSV) Vaccine - COVERED BY MEDICARE PART D  * RSVPreF3 (Arexvy) CDC recommends that adults 60 years of age and older may receive a single dose of RSV vaccine using shared clinical decision-making (SCDM)   Tetanus (Td) Vaccine - COST NOT COVERED BY MEDICARE PART B Following completion of primary series, a booster dose should be given every 10 years to maintain immunity against tetanus. Td may also be given as tetanus wound prophylaxis.   Tdap Vaccine - COST NOT COVERED BY MEDICARE PART B Recommended at least once for all adults. For pregnant patients, recommended with each pregnancy.   Shingles Vaccine (Shingrix) - COST NOT COVERED BY MEDICARE PART B  2 shot series recommended in those 19 years and older who have or will have weakened immune systems or those 50 years and older     Health Maintenance Due:      Topic Date Due   • Colorectal Cancer Screening  09/18/2030   • HIV Screening  Completed   • Hepatitis C Screening  Completed     Immunizations Due:      Topic Date Due   • Influenza Vaccine (Season Ended) 09/01/2024     Advance Directives   What are advance  directives?  Advance directives are legal documents that state your wishes and plans for medical care. These plans are made ahead of time in case you lose your ability to make decisions for yourself. Advance directives can apply to any medical decision, such as the treatments you want, and if you want to donate organs.   What are the types of advance directives?  There are many types of advance directives, and each state has rules about how to use them. You may choose a combination of any of the following:  Living will:  This is a written record of the treatment you want. You can also choose which treatments you do not want, which to limit, and which to stop at a certain time. This includes surgery, medicine, IV fluid, and tube feedings.   Durable power of  for healthcare (DPAHC):  This is a written record that states who you want to make healthcare choices for you when you are unable to make them for yourself. This person, called a proxy, is usually a family member or a friend. You may choose more than 1 proxy.  Do not resuscitate (DNR) order:  A DNR order is used in case your heart stops beating or you stop breathing. It is a request not to have certain forms of treatment, such as CPR. A DNR order may be included in other types of advance directives.  Medical directive:  This covers the care that you want if you are in a coma, near death, or unable to make decisions for yourself. You can list the treatments you want for each condition. Treatment may include pain medicine, surgery, blood transfusions, dialysis, IV or tube feedings, and a ventilator (breathing machine).  Values history:  This document has questions about your views, beliefs, and how you feel and think about life. This information can help others choose the care that you would choose.  Why are advance directives important?  An advance directive helps you control your care. Although spoken wishes may be used, it is better to have your wishes  written down. Spoken wishes can be misunderstood, or not followed. Treatments may be given even if you do not want them. An advance directive may make it easier for your family to make difficult choices about your care.   Narcotic (Opioid) Safety    Use narcotics safely:  Take prescribed narcotics exactly as directed  Do not give narcotics to others or take narcotics that belong to someone else  Do not mix narcotics without medicines or alcohol  Do not drive or operate heavy machinery after you take the narcotic  Monitor for side effects and notify your healthcare provider if you experienced side effects such as nausea, sleepiness, itching, or trouble thinking clearly.    Manage constipation:    Constipation is the most common side effect of narcotic medicine. Constipation is when you have hard, dry bowel movements, or you go longer than usual between bowel movements. Tell your healthcare provider about all changes in your bowel movements while you are taking narcotics. He or she may recommend laxative medicine to help you have a bowel movement. He or she may also change the kind of narcotic you are taking, or change when you take it. The following are more ways you can prevent or relieve constipation:    Drink liquids as directed.  You may need to drink extra liquids to help soften and move your bowels. Ask how much liquid to drink each day and which liquids are best for you.  Eat high-fiber foods.  This may help decrease constipation by adding bulk to your bowel movements. High-fiber foods include fruits, vegetables, whole-grain breads and cereals, and beans. Your healthcare provider or dietitian can help you create a high-fiber meal plan. Your provider may also recommend a fiber supplement if you cannot get enough fiber from food.  Exercise regularly.  Regular physical activity can help stimulate your intestines. Walking is a good exercise to prevent or relieve constipation. Ask which exercises are best for  you.  Schedule a time each day to have a bowel movement.  This may help train your body to have regular bowel movements. Bend forward while you are on the toilet to help move the bowel movement out. Sit on the toilet for at least 10 minutes, even if you do not have a bowel movement.    Store narcotics safely:   Store narcotics where others cannot easily get them.  Keep them in a locked cabinet or secure area. Do not  keep them in a purse or other bag you carry with you. A person may be looking for something else and find the narcotics.  Make sure narcotics are stored out of the reach of children.  A child can easily overdose on narcotics. Narcotics may look like candy to a small child.    The best way to dispose of narcotics:      The laws vary by country and area. In the [place], the best way is to return the narcotics through a take-back program. This program is offered by the [place] Drug Enforcement Agency (FABIAN). The following are options for using the program:  Take the narcotics to a [employer] collection site.  The site is often a law enforcement center. Call your local law enforcement center for scheduled take-back days in your area. You will be given information on where to go if the collection site is in a different location.  Take the narcotics to an approved pharmacy or hospital.  A pharmacy or hospital may be set up as a collection site. You will need to ask if it is a [employer] collection site if you were not directed there. A pharmacy or doctor's office may not be able to take back narcotics unless it is a FABIAN site.  Use a mail-back system.  This means you are given containers to put the narcotics into. You will then mail them in the containers.  Use a take-back drop box.  This is a place to leave the narcotics at any time. People and animals will not be able to get into the box. Your local law enforcement agency can tell you where to find a drop box in your area.    Other ways to manage pain:   Ask your  healthcare provider about non-narcotic medicines to control pain.  Nonprescription medicines include NSAIDs (such as ibuprofen) and acetaminophen. Prescription medicines include muscle relaxers, antidepressants, and steroids.  Pain may be managed without any medicines.  Some ways to relieve pain include massage, aromatherapy, or meditation. Physical or occupational therapy may also help.    For more information:   Drug Enforcement Administration  [address] [place] [address]  Phone: 1- 140 - 771-1643  Web Address: https://[email or web address].gov/drug_disposal/     Food and Drug Administration  [id number] [place] [address]  Phone: [phone]  Web Address: http://www.fda.gov     © Copyright [employer] 2018 Information is for End User's use only and may not be sold, redistributed or otherwise used for commercial purposes. All illustrations and images included in CareNotes® are the copyrighted property of [employer].D.A[employer]M., Inc. or [employer]

## 2024-09-19 ENCOUNTER — TELEPHONE (OUTPATIENT)
Dept: INTERNAL MEDICINE CLINIC | Facility: CLINIC | Age: 49
End: 2024-09-19

## 2024-09-19 NOTE — TELEPHONE ENCOUNTER
Patient is requesting a referral for ophthalmology. Patient have an appointment with [facility] in [place] on Oct 16. Please fax referral over to [phone].    Patient would like a call once fax is sent over.

## 2024-09-27 DIAGNOSIS — H53.8 BLURRY VISION: Primary | ICD-10-CM

## 2024-09-30 NOTE — TELEPHONE ENCOUNTER
Called and spoke to patient. Patient was made aware the referral was placed for Dr. Nieves. Patient was given phone number to the [facility] for [facility] [phone]. Patient understood, agreed and stated they will call the office and make their appointment to be seen.

## 2024-10-13 DIAGNOSIS — K21.9 CHRONIC GERD: ICD-10-CM

## 2024-10-14 RX ORDER — OMEPRAZOLE 40 MG/1
40 CAPSULE, DELAYED RELEASE ORAL DAILY
Qty: 30 CAPSULE | Refills: 0 | Status: SHIPPED | OUTPATIENT
Start: 2024-10-14

## 2024-10-16 ENCOUNTER — CONSULT (OUTPATIENT)
Dept: MULTI SPECIALTY CLINIC | Facility: CLINIC | Age: 49
End: 2024-10-16

## 2024-10-16 VITALS — TEMPERATURE: 98 F | BODY MASS INDEX: 24.45 KG/M2 | WEIGHT: 138 LBS | HEIGHT: 63 IN

## 2024-10-16 DIAGNOSIS — L80 VITILIGO: Primary | ICD-10-CM

## 2024-10-16 DIAGNOSIS — E55.9 VITAMIN D DEFICIENCY: ICD-10-CM

## 2024-10-16 DIAGNOSIS — D51.9 ANEMIA DUE TO VITAMIN B12 DEFICIENCY, UNSPECIFIED B12 DEFICIENCY TYPE: ICD-10-CM

## 2024-10-16 DIAGNOSIS — L57.0 KERATOSIS: ICD-10-CM

## 2024-10-16 NOTE — PROGRESS NOTES
"[facility] Dermatology Clinic Note     Patient Name: Bill Serrano  Encounter Date: 10/16/2024     Have you been cared for by a [facility] Dermatologist in the last 3 years and, if so, which description applies to you?    NO.   I am considered a \"new\" patient and must complete all patient intake questions. I am MALE/not capable of bearing children.    REVIEW OF SYSTEMS:  Have you recently had or currently have any of the following? Recent fever or chills? No  Any non-healing wound? No   PAST MEDICAL HISTORY:  Have you personally ever had or currently have any of the following?  If \"YES,\" then please provide more detail. Skin cancer (such as Melanoma, Basal Cell Carcinoma, Squamous Cell Carcinoma?  No  Tuberculosis, HIV/AIDS, Hepatitis B or C: No  Radiation Treatment No   HISTORY OF IMMUNOSUPPRESSION:   Do you have a history of any of the following:  Systemic Immunosuppression such as Diabetes, Biologic or Immunotherapy, Chemotherapy, Organ Transplantation, Bone Marrow Transplantation or Prednisone?  No     Answering \"YES\" requires the addition of the dotphrase \"IMMUNOSUPPRESSED\" as the first diagnosis of the patient's visit.   FAMILY HISTORY:  Any \"first degree relatives\" (parent, brother, sister, or child) with the following?    Skin Cancer, Pancreatic or Other Cancer? No   PATIENT EXPERIENCE:    Do you want the Dermatologist to perform a COMPLETE skin exam today including a clinical examination under the \"bra and underwear\" areas?  yes  If necessary, do we have your permission to call and leave a detailed message on your Preferred Phone number that includes your specific medical information?  Yes      No Known Allergies   Current Outpatient Medications:     NEEDLE, DISP, 25 G 25G X 1\" MISC, Use once a week, Disp: 100 each, Rfl: 2    omeprazole (PriLOSEC) 40 MG capsule, Take 1 capsule by mouth once daily, Disp: 30 capsule, Rfl: 0    senna-docusate sodium (SENOKOT-S) 8.6-50 mg per tablet, Take 2 tablets by " mouth every 12 (twelve) hours as needed for constipation, Disp: , Rfl:     cyanocobalamin 1,000 mcg/mL, Inject 1 mL (1,000 mcg total) into a muscle every 30 (thirty) days, Disp: 3 mL, Rfl: 3    lamoTRIgine (LaMICtal) 25 mg tablet, Take 2 tablets (50 mg total) by mouth in the morning, Disp: 60 tablet, Rfl: 2    polyethylene glycol (MIRALAX) 17 g packet, Take 17 g by mouth daily, Disp: 510 g, Rfl: 0    psyllium (METAMUCIL SMOOTH TEXTURE) 28 % packet, Take 1 packet by mouth 2 (two) times a day, Disp: 180 packet, Rfl: 0    QUEtiapine (SEROquel) 300 mg tablet, Take 1 tablet (300 mg total) by mouth daily at bedtime, Disp: 90 tablet, Rfl: 0    tiZANidine (ZANAFLEX) 4 mg tablet, Take 1 tablet (4 mg total) by mouth every 8 (eight) hours as needed for muscle spasms, Disp: 90 tablet, Rfl: 2          Whom besides the patient is providing clinical information about today's encounter?   NO ADDITIONAL HISTORIAN (patient alone provided history)    Physical Exam and Assessment/Plan by Diagnosis:    Patient is present for skin check, he notes that he has some white spots on his skin that he is concerned about       SEBORRHEIC KERATOSES  - Relevant exam: Scattered over the trunk/extremities are waxy brown to black plaques and papules with stuck on appearance and consistent dermoscopy  - Exam and clinical history consistent with seborrheic keratoses  - Counseled that these are benign growths that do not require treatment    MELANOCYTIC NEVI  -Relevant exam: Scattered over the trunk/extremities are homogenously pigmented brown macules and papules. ELM performed and without concerning findings. No outliers unless otherwise noted in today's note  - Exam and clinical history consistent with melanocytic nevi  - Counseled to return to clinic prior to scheduled appointment should any of these lesions change or should any new lesions of concern arise  - Counseled on use of sun protection daily. Reviewed latest FDA sunscreen guidelines,  including use of broad spectrum (UVA and UVB blocking) sunscreen or sun protective clothing with SPF 30-50 every 2-3 hours and reapplied after exposure to water    LENTIGINES  OTHER SKIN CHANGES DUE TO CHRONIC EXPOSURE TO NONIONIZING RADIATION  - Relevant exam: Over sun exposed areas are brown macules. ELM performed and without concerning findings.  - Exam and clinical history consistent with lentigines.  - Counseled to return to clinic prior to scheduled appointment should any of these lesions change or should any new lesions of concern arise.  - Recommended use of sunscreen as above and below.    CHERRY ANGIOMAS  - Relevant exam: Scattered over the trunk/extremities are red papules  - Exam and clinical history consistent with cherry angiomas  - Educated that these are benign            Scribe Attestation      I,:  Gina Loving MA am acting as a scribe while in the presence of the attending physician.:       I,:  Jamaal Florez MD personally performed the services described in this documentation    as scribed in my presence.:

## 2024-11-10 DIAGNOSIS — K21.9 CHRONIC GERD: ICD-10-CM

## 2024-11-11 RX ORDER — OMEPRAZOLE 40 MG/1
40 CAPSULE, DELAYED RELEASE ORAL DAILY
Qty: 30 CAPSULE | Refills: 5 | Status: SHIPPED | OUTPATIENT
Start: 2024-11-11

## 2025-01-22 ENCOUNTER — APPOINTMENT (OUTPATIENT)
Dept: LAB | Facility: CLINIC | Age: 50
End: 2025-01-22
Payer: MEDICARE

## 2025-01-22 DIAGNOSIS — E55.9 VITAMIN D DEFICIENCY: ICD-10-CM

## 2025-01-22 DIAGNOSIS — D51.9 ANEMIA DUE TO VITAMIN B12 DEFICIENCY, UNSPECIFIED B12 DEFICIENCY TYPE: ICD-10-CM

## 2025-01-22 DIAGNOSIS — L80 VITILIGO: ICD-10-CM

## 2025-01-22 LAB
25(OH)D3 SERPL-MCNC: 42 NG/ML (ref 30–100)
ALBUMIN SERPL BCG-MCNC: 4.6 G/DL (ref 3.5–5)
ALP SERPL-CCNC: 44 U/L (ref 34–104)
ALT SERPL W P-5'-P-CCNC: 38 U/L (ref 7–52)
ANION GAP SERPL CALCULATED.3IONS-SCNC: 9 MMOL/L (ref 4–13)
AST SERPL W P-5'-P-CCNC: 27 U/L (ref 13–39)
BASOPHILS # BLD AUTO: 0.04 THOUSANDS/ΜL (ref 0–0.1)
BASOPHILS NFR BLD AUTO: 1 % (ref 0–1)
BILIRUB SERPL-MCNC: 0.78 MG/DL (ref 0.2–1)
BUN SERPL-MCNC: 24 MG/DL (ref 5–25)
CALCIUM SERPL-MCNC: 10.2 MG/DL (ref 8.4–10.2)
CHLORIDE SERPL-SCNC: 103 MMOL/L (ref 96–108)
CO2 SERPL-SCNC: 31 MMOL/L (ref 21–32)
CREAT SERPL-MCNC: 1.16 MG/DL (ref 0.6–1.3)
EOSINOPHIL # BLD AUTO: 0.14 THOUSAND/ΜL (ref 0–0.61)
EOSINOPHIL NFR BLD AUTO: 3 % (ref 0–6)
ERYTHROCYTE [DISTWIDTH] IN BLOOD BY AUTOMATED COUNT: 12.2 % (ref 11.6–15.1)
FOLATE SERPL-MCNC: >22.3 NG/ML
GFR SERPL CREATININE-BSD FRML MDRD: 73 ML/MIN/1.73SQ M
GLUCOSE P FAST SERPL-MCNC: 105 MG/DL (ref 65–99)
HCT VFR BLD AUTO: 45 % (ref 36.5–49.3)
HGB BLD-MCNC: 14.7 G/DL (ref 12–17)
IMM GRANULOCYTES # BLD AUTO: 0.01 THOUSAND/UL (ref 0–0.2)
IMM GRANULOCYTES NFR BLD AUTO: 0 % (ref 0–2)
LYMPHOCYTES # BLD AUTO: 1.6 THOUSANDS/ΜL (ref 0.6–4.47)
LYMPHOCYTES NFR BLD AUTO: 36 % (ref 14–44)
MCH RBC QN AUTO: 28 PG (ref 26.8–34.3)
MCHC RBC AUTO-ENTMCNC: 32.7 G/DL (ref 31.4–37.4)
MCV RBC AUTO: 86 FL (ref 82–98)
MONOCYTES # BLD AUTO: 0.5 THOUSAND/ΜL (ref 0.17–1.22)
MONOCYTES NFR BLD AUTO: 11 % (ref 4–12)
NEUTROPHILS # BLD AUTO: 2.16 THOUSANDS/ΜL (ref 1.85–7.62)
NEUTS SEG NFR BLD AUTO: 49 % (ref 43–75)
NRBC BLD AUTO-RTO: 0 /100 WBCS
PLATELET # BLD AUTO: 239 THOUSANDS/UL (ref 149–390)
PMV BLD AUTO: 10.1 FL (ref 8.9–12.7)
POTASSIUM SERPL-SCNC: 4 MMOL/L (ref 3.5–5.3)
PROT SERPL-MCNC: 7.6 G/DL (ref 6.4–8.4)
RBC # BLD AUTO: 5.25 MILLION/UL (ref 3.88–5.62)
SODIUM SERPL-SCNC: 143 MMOL/L (ref 135–147)
VIT B12 SERPL-MCNC: 415 PG/ML (ref 180–914)
WBC # BLD AUTO: 4.45 THOUSAND/UL (ref 4.31–10.16)

## 2025-01-22 PROCEDURE — 82746 ASSAY OF FOLIC ACID SERUM: CPT

## 2025-01-22 PROCEDURE — 82607 VITAMIN B-12: CPT

## 2025-01-22 PROCEDURE — 85025 COMPLETE CBC W/AUTO DIFF WBC: CPT

## 2025-01-22 PROCEDURE — 36415 COLL VENOUS BLD VENIPUNCTURE: CPT

## 2025-01-22 PROCEDURE — 82306 VITAMIN D 25 HYDROXY: CPT

## 2025-01-22 PROCEDURE — 80053 COMPREHEN METABOLIC PANEL: CPT

## 2025-02-19 ENCOUNTER — OFFICE VISIT (OUTPATIENT)
Dept: INTERNAL MEDICINE CLINIC | Facility: CLINIC | Age: 50
End: 2025-02-19

## 2025-02-19 VITALS
WEIGHT: 132 LBS | DIASTOLIC BLOOD PRESSURE: 86 MMHG | SYSTOLIC BLOOD PRESSURE: 135 MMHG | HEART RATE: 69 BPM | HEIGHT: 63 IN | TEMPERATURE: 98 F | BODY MASS INDEX: 23.39 KG/M2

## 2025-02-19 DIAGNOSIS — D51.0 PERNICIOUS ANEMIA: ICD-10-CM

## 2025-02-19 DIAGNOSIS — L80 VITILIGO: ICD-10-CM

## 2025-02-19 DIAGNOSIS — R53.83 OTHER FATIGUE: ICD-10-CM

## 2025-02-19 DIAGNOSIS — M54.12 CERVICAL RADICULOPATHY: ICD-10-CM

## 2025-02-19 DIAGNOSIS — E78.00 PURE HYPERCHOLESTEROLEMIA: Primary | Chronic | ICD-10-CM

## 2025-02-19 DIAGNOSIS — F31.30 BIPOLAR AFFECTIVE DISORDER, CURRENT EPISODE DEPRESSED, CURRENT EPISODE SEVERITY UNSPECIFIED (HCC): ICD-10-CM

## 2025-02-19 DIAGNOSIS — L82.1 SEBORRHEIC KERATOSES: ICD-10-CM

## 2025-02-19 DIAGNOSIS — F41.8 DEPRESSION WITH ANXIETY: ICD-10-CM

## 2025-02-19 PROCEDURE — 99214 OFFICE O/P EST MOD 30 MIN: CPT | Performed by: INTERNAL MEDICINE

## 2025-02-19 PROCEDURE — G2211 COMPLEX E/M VISIT ADD ON: HCPCS | Performed by: INTERNAL MEDICINE

## 2025-02-19 RX ORDER — BENZOCAINE/MENTHOL 6 MG-10 MG
LOZENGE MUCOUS MEMBRANE 4 TIMES DAILY PRN
Qty: 28 G | Refills: 0 | Status: SHIPPED | OUTPATIENT
Start: 2025-02-19 | End: 2025-03-01

## 2025-02-19 NOTE — ASSESSMENT & PLAN NOTE
History of pernicious anemia on B12 supplementation.  Most recent B12 level of 450 with normal CBC

## 2025-02-19 NOTE — ASSESSMENT & PLAN NOTE
Known disc herniation in the C-spine and L-spine and following with pain management and orthopedic surgery will start Voltaren gel  Orders:    Diclofenac Sodium (VOLTAREN) 1 %; Apply 2 g topically 4 (four) times a day

## 2025-02-19 NOTE — ASSESSMENT & PLAN NOTE
Having symptoms concerning for depression however will check for TSH given his history of autoimmune diseases.  Will continue Seroquel at this time

## 2025-02-19 NOTE — PROGRESS NOTES
Name: Bill Serrano      : 1975      MRN: [id number]  Encounter Provider: Raegan Elizabeth MD  Encounter Date: 2025   Encounter department: [facility] [place]  :  Assessment & Plan  Bipolar affective disorder, current episode depressed, current episode severity unspecified (HCC)  Continuing on Lamictal and Seroquel    Orders:    Hemoglobin A1C; Future    TSH + Free T4; Future    Lipid Panel with Direct LDL reflex; Future    Pure hypercholesterolemia  Obtaining lipid panel now.  Previous LDL elevated to 127.  Information regarding diet and cholesterol given       Depression with anxiety  Having symptoms concerning for depression however will check for TSH given his history of autoimmune diseases.  Will continue Seroquel at this time         Seborrheic keratoses  Exam consistent with seborrheic keratoses in the external canal of the ear and in his care.  Will start hydrocortisone 1% cream and Selsun  Orders:    hydrocortisone 1 % cream; Apply topically 4 (four) times a day as needed for irritation for up to 10 days    selenium sulfide (SELSUN) 1 %; Apply topically 2 (two) times a week    Vitiligo  Small patches of vitiligo present on his lower extremities sees dermatology and not currently on medications for it       Pernicious anemia  History of pernicious anemia on B12 supplementation.  Most recent B12 level of 450 with normal CBC       Other fatigue  Given fatigue in the setting of autoimmune disease we will also check cortisol due to possible concern for Santa [place]sabel's disease.  Orders:    Cortisol; Future    Cervical radiculopathy  Known disc herniation in the C-spine and L-spine and following with pain management and orthopedic surgery will start Voltaren gel  Orders:    Diclofenac Sodium (VOLTAREN) 1 %; Apply 2 g topically 4 (four) times a day           History of Present Illness     HPI    47 y/o male with history of BPD, cervical and lumbar radiculopathy s/p C5-6  fusion, pernicious anemia, BPH s/p TURP, constipation who presents for follow up. Patient has been on B12 supplementation IM and most recent B12 level was normal.  His main concern at this time has been worsening fatigue to the point that he is unable to walk more than 10 minutes before needing to stop.  He denies any chest pain or shortness of breath but does note some symptoms concerning for depression.  No history of thyroid disease does not note any swelling in his neck.  Does not note any increased need for salty foods.  He does state that he has been peeing a lot more than usual and has been feeling like he is drinking lots of water.  Of note a CMP recently showed an elevated fasting glucose of 105.  No previous history of diabetes.  Patient's neck pain and lumbar radiculopathy is similar to previous he continues to have sciatica down his left leg and has numbness and tingling down his bilateral upper extremities at night.  Denies any weakness of the upper extremities or weakness in the legs saddle anesthesia bowel or bladder incontinence.  He does see orthopedic surgery and pain management last saw them a year ago and had trigger point injections that he stated that did not help with the pain.  He is willing to potentially get steroid injections and will call pain management.  He does note that he has itchiness around his ears and flakiness.  Also had blurry vision a couple of months ago and saw ophthalmology and was told that there was nothing wrong but continues to have blurry vision.  He was told when he was a child that he had small cataracts never got cataract surgery.      Review of Systems   Constitutional:  Negative for chills and fever.   HENT:  Negative for ear pain and sore throat.    Eyes:  Negative for pain and visual disturbance.   Respiratory:  Negative for cough and shortness of breath.    Cardiovascular:  Negative for chest pain and palpitations.   Gastrointestinal:  Negative for abdominal  "pain and vomiting.   Genitourinary:  Negative for dysuria and hematuria.   Musculoskeletal:  Negative for arthralgias and back pain.   Skin:  Negative for color change and rash.   Neurological:  Negative for seizures and syncope.   All other systems reviewed and are negative.      Objective   /86 (BP Location: Right arm, Patient Position: Sitting, Cuff Size: Adult)   Pulse 69   Temp 98 °F (36.7 °C) (Temporal)   Ht 5' 3\" (1.6 m)   Wt 59.9 kg (132 lb)   BMI 23.38 kg/m²      Physical Exam  Vitals and nursing note reviewed.   Constitutional:       General: He is not in acute distress.     Appearance: He is well-developed.   HENT:      Head: Normocephalic and atraumatic.   Eyes:      Conjunctiva/sclera: Conjunctivae normal.   Cardiovascular:      Rate and Rhythm: Normal rate and regular rhythm.      Heart sounds: No murmur heard.  Pulmonary:      Effort: Pulmonary effort is normal. No respiratory distress.      Breath sounds: Normal breath sounds.   Abdominal:      Palpations: Abdomen is soft.      Tenderness: There is no abdominal tenderness.   Musculoskeletal:         General: No swelling.      Cervical back: Neck supple.   Skin:     General: Skin is warm and dry.      Capillary Refill: Capillary refill takes less than 2 seconds.   Neurological:      Mental Status: He is alert.   Psychiatric:         Mood and Affect: Mood normal.       "

## 2025-02-19 NOTE — ASSESSMENT & PLAN NOTE
Obtaining lipid panel now.  Previous LDL elevated to 127.  Information regarding diet and cholesterol given

## 2025-04-05 DIAGNOSIS — D51.0 PERNICIOUS ANEMIA: ICD-10-CM

## 2025-04-07 RX ORDER — CYANOCOBALAMIN 1000 UG/ML
INJECTION, SOLUTION INTRAMUSCULAR; SUBCUTANEOUS
Qty: 3 ML | Refills: 0 | Status: SHIPPED | OUTPATIENT
Start: 2025-04-07

## 2025-04-26 DIAGNOSIS — K21.9 CHRONIC GERD: ICD-10-CM

## 2025-04-28 RX ORDER — OMEPRAZOLE 40 MG/1
40 CAPSULE, DELAYED RELEASE ORAL DAILY
Qty: 30 CAPSULE | Refills: 5 | Status: SHIPPED | OUTPATIENT
Start: 2025-04-28

## 2025-05-14 ENCOUNTER — APPOINTMENT (OUTPATIENT)
Dept: LAB | Facility: CLINIC | Age: 50
End: 2025-05-14
Payer: MEDICARE

## 2025-05-14 DIAGNOSIS — F31.30 BIPOLAR AFFECTIVE DISORDER, CURRENT EPISODE DEPRESSED, CURRENT EPISODE SEVERITY UNSPECIFIED (HCC): ICD-10-CM

## 2025-05-14 DIAGNOSIS — R53.83 OTHER FATIGUE: ICD-10-CM

## 2025-05-14 LAB
CHOLEST SERPL-MCNC: 190 MG/DL (ref ?–200)
CORTIS SERPL-MCNC: 16.3 UG/DL
EST. AVERAGE GLUCOSE BLD GHB EST-MCNC: 111 MG/DL
HBA1C MFR BLD: 5.5 %
HDLC SERPL-MCNC: 47 MG/DL
LDLC SERPL CALC-MCNC: 125 MG/DL (ref 0–100)
T4 FREE SERPL-MCNC: 0.72 NG/DL (ref 0.61–1.12)
TRIGL SERPL-MCNC: 92 MG/DL (ref ?–150)
TSH SERPL DL<=0.05 MIU/L-ACNC: 2.42 UIU/ML (ref 0.45–4.5)

## 2025-05-14 PROCEDURE — 83036 HEMOGLOBIN GLYCOSYLATED A1C: CPT

## 2025-05-14 PROCEDURE — 84443 ASSAY THYROID STIM HORMONE: CPT

## 2025-05-14 PROCEDURE — 36415 COLL VENOUS BLD VENIPUNCTURE: CPT

## 2025-05-14 PROCEDURE — 84439 ASSAY OF FREE THYROXINE: CPT

## 2025-05-14 PROCEDURE — 80061 LIPID PANEL: CPT

## 2025-05-14 PROCEDURE — 82533 TOTAL CORTISOL: CPT

## 2025-05-19 ENCOUNTER — OFFICE VISIT (OUTPATIENT)
Dept: INTERNAL MEDICINE CLINIC | Facility: CLINIC | Age: 50
End: 2025-05-19

## 2025-05-19 VITALS
BODY MASS INDEX: 24.06 KG/M2 | WEIGHT: 135.8 LBS | HEART RATE: 64 BPM | OXYGEN SATURATION: 97 % | DIASTOLIC BLOOD PRESSURE: 84 MMHG | TEMPERATURE: 98.1 F | SYSTOLIC BLOOD PRESSURE: 133 MMHG

## 2025-05-19 DIAGNOSIS — F31.9 BIPOLAR DISEASE, CHRONIC (HCC): ICD-10-CM

## 2025-05-19 DIAGNOSIS — K21.9 GASTROESOPHAGEAL REFLUX DISEASE, UNSPECIFIED WHETHER ESOPHAGITIS PRESENT: ICD-10-CM

## 2025-05-19 DIAGNOSIS — D51.0 PERNICIOUS ANEMIA: ICD-10-CM

## 2025-05-19 DIAGNOSIS — E78.2 MIXED HYPERLIPIDEMIA: ICD-10-CM

## 2025-05-19 DIAGNOSIS — M54.12 CERVICAL RADICULOPATHY: ICD-10-CM

## 2025-05-19 DIAGNOSIS — N40.0 BENIGN PROSTATIC HYPERPLASIA, UNSPECIFIED WHETHER LOWER URINARY TRACT SYMPTOMS PRESENT: ICD-10-CM

## 2025-05-19 DIAGNOSIS — M54.16 LUMBAR RADICULOPATHY: ICD-10-CM

## 2025-05-19 DIAGNOSIS — K64.8 OTHER HEMORRHOIDS: Chronic | ICD-10-CM

## 2025-05-19 DIAGNOSIS — M79.10 MYALGIA: Primary | ICD-10-CM

## 2025-05-19 DIAGNOSIS — K59.04 CHRONIC IDIOPATHIC CONSTIPATION: Chronic | ICD-10-CM

## 2025-05-19 PROCEDURE — 99214 OFFICE O/P EST MOD 30 MIN: CPT | Performed by: INTERNAL MEDICINE

## 2025-05-19 PROCEDURE — G2211 COMPLEX E/M VISIT ADD ON: HCPCS | Performed by: INTERNAL MEDICINE

## 2025-05-19 RX ORDER — FAMOTIDINE 20 MG/1
20 TABLET, FILM COATED ORAL DAILY
Qty: 90 TABLET | Refills: 3 | Status: SHIPPED | OUTPATIENT
Start: 2025-05-19 | End: 2026-05-14

## 2025-05-19 NOTE — PROGRESS NOTES
"Name: Bill Serrano      : 1975      MRN: [id number]  Encounter Provider: Felipe Spence DO  Encounter Date: 2025   Encounter department: [facility] [place]  :  Assessment & Plan  Myalgia  - Patient has been complaining of fatigue and myalgia since last visit. He reports it has been years. Denies recent travel history or hunting. No wild animal exposure. No dogs at home. But does live close to the woods.   - Also reports throbbing pain when he has headache with blurry vision  - No family history of autoimmune and no new rashes noted  - TSH and cortisol checked last visit and wnl  - Will check iron panel, Lyme  ab, ESR, and CRP    Orders:    Lyme disease, PCR; Future    TIBC Panel (incl. Iron, TIBC, % Iron Saturation); Future    Sedimentation rate, automated; Future    C-reactive protein; Future    Benign prostatic hyperplasia, unspecified whether lower urinary tract symptoms present  - BPH s/p TURP in  and transurethral vaporization of the prostate for high and tight bladder neck   - Last seen by urology on 2022, was supposed to follow up in 2024  - PSA 0.9 in   - I attached the number of [facility] for patient and encourage patient to call them for follow-up appointment   - Will check PSA    Refer to \"fatigue\" for details  Orders:    PSA, Total Screen; Future    Gastroesophageal reflux disease, unspecified whether esophagitis present  - Recent EGD on 24 with normal esophagus, stomach, and duodenum. Biopsies negative for H. Pylori and malignancy but mild chronic inactive gastritis in antral mucosa.  - Currently on omeprazole 40 mg daily. Given pernicious anemia, will decrease omeprazole to 20 mg daily for a month and followed by Pepcid 20 mg as needed  - Provide patient a list of trigger foods to avoid    Orders:    famotidine (PEPCID) 20 mg tablet; Take 1 tablet (20 mg total) by mouth daily    Other hemorrhoids  - Internal hemorrhoids per " colonoscopy in 9/2023, next colonoscopy due in 9/2030  - Patient reports hematochezia and BRBPR which are not new to him, though denies change in stool diameter  - Due to family history of colon CA in sister, always concern for colon cancer though colonoscopy in 9/2023 revealed 2 subcentimeter polyps with biopsy proven tubular adenoma  - Discuss with patient and will have patient to follow up with GI as soon as possible with potential anoscopy in the office       Chronic idiopathic constipation  - With associated chronic abdominal pain  - Family history of colon CA in sister s/p resection with colostomy   - Seen by GI , last office visit on 5/1/2024  - Colonoscopy in 9/2023 revealed 2 subcentimeter polyps with biopsy proven tubular adenoma, next due in 9/2030  - Has tried MiraLAX and Metamucil in the past w/o improvement; better with Linzess but due to cost, currently not on Linzess  - Currently on MiraLAX as needed. I discuss with patient to take it daily instead as he has a bowel movement every 3 days instead of daily. Also discuss with patient okay to increase to twice daily if tolerated        Cervical radiculopathy  - Chronic neck pain with bilateral radiation to trapezius  - S/p C5-C6 fusion in 2011  - History of trigger point inhections in trapezius in 2023 with 50% relief and MRI C-spine showed stable fusion; MRI L-spine showed mild facet hypertrophy at L4-L5  - Has been following pain management, last seen in 2024; does think it is helpful and does not want to follow up with pain due to high co-pay  - Tolerates with tizanidine 4 mg Q8H as needed and voltaren gel  - Encourage home exercises        Lumbar radiculopathy  Refer to above       Bipolar disease, chronic (HCC)  - Compliant with Lamotrigine and Seroquel   - Has been following with psychiatrist        Pernicious anemia  - With associated history of chronic dyspepsia  - EGD in 5/2024 was normal and pathoilogy showed mild chronic inactive gastritis and  negative H. pylori  - Higher risk for gastric adenocarcinoma but insufficient data to support routine surveillance with upper endoscopy following an initial screening in the absence of symptoms or to alter the patient's schedule for colon cancer screening based on the diagnosis of PA  - Vit B12 of 415, Hgb 14.7, and MCV 86 on 1/22/25  - Monthly IM B12 1,000 mg started 4/2024, still on monthly injection  - Decrease PPI dose by half to 20 mg daily for a month and wean off. Pepcdid as needed after that  - Provide a list of trigger foods to avoid to patient as well       Mixed hyperlipidemia  - Lipid panel 5/14/2025: , TG 92, HDL 47, and ; not significant change in , , and  in 1/2024  - A1c 5.5 on 5/14/25  - 10-year ASCVD risk of 5.8%  - Discuss lifestyle medications including increase white meats and protein intake and maintain at least 150 mins of physical activities per week    - Discuss statin therapy with patient. Patient would like to hold off on statin, I do think it is reasonable given no diabetes and no clinical ASCVD              History of Present Illness   Bill is a 49-year-old male with a past medical history of bipolar affective disorder, hyperlipidemia, vitiligo, seborrheic keratoses, pernicious anemia, cervical  and lumbar radiculopathy s/p C5-6 fusion in 2011, BPH s/p TURP in 2020 and transurethral vaporization of the prostate for high and tight bladder neck presents to the office for 3-mo follow up visit. Patient was seen on 2/19/25 and reported blurry vision though previously seen by ophthalmology and was told he needed reading glasses; also endorses scaling of the ears with questionable history of seborheic dermatitis, was prescribed selenium sulfide and hydrocortisone 1% cream. Patient reports rash in lower jaw, lips, and right nostril with burning pain 3 weeks after last office visit. Though no sores in the mouth. He states it has happened in the past. His wife  "gave him topical \"antibiotic\" and has resolved. Discuss with patient that it was most likely oral herpes and encourage patient to take pictures next time and notify clinic so we get see him as same day visit.     Cervical and lumbar radiculopathy   - Chronic neck pain with bilateral radiation to trapezius  - S/p C5-C6 fusion in 2011  - Currently tizanidine 4 mg Q8H as needed and voltaren gel    Chronic constipation   - With associated chronic abdominal pain  - Takes MiraLAX as needed and has bowel movement every 3 days  - Previously on Linzess but due to cost      Pernicious anemia  - Monthly IM B12 1,000 mg started 4/2024    Bipolar disoder  - Compliant with Lamotrigine and Seroquel         Review of Systems   Constitutional:  Positive for fatigue. Negative for activity change, appetite change, chills and fever.   HENT:  Negative for ear pain and sore throat.    Eyes:  Positive for visual disturbance. Negative for pain.   Respiratory:  Negative for cough, chest tightness and shortness of breath.    Cardiovascular:  Negative for chest pain, palpitations and leg swelling.   Gastrointestinal:  Positive for blood in stool, constipation and rectal pain. Negative for abdominal pain, diarrhea, nausea and vomiting.   Genitourinary:  Negative for dysuria and hematuria.   Musculoskeletal:  Positive for arthralgias and myalgias. Negative for back pain.   Skin:  Negative for color change and rash.   Neurological:  Positive for weakness. Negative for seizures and syncope.   All other systems reviewed and are negative.      Objective   /84 (BP Location: Left arm, Patient Position: Sitting, Cuff Size: Large)   Pulse 64   Temp 98.1 °F (36.7 °C) (Temporal)   Wt 61.6 kg (135 lb 12.8 oz)   SpO2 97%   BMI 24.06 kg/m²      Physical Exam  Vitals and nursing note reviewed.   Constitutional:       General: He is not in acute distress.     Appearance: He is well-developed.      Comments: Skinny looking   HENT:      Head: " Normocephalic and atraumatic.     Eyes:      Conjunctiva/sclera: Conjunctivae normal.       Cardiovascular:      Rate and Rhythm: Normal rate and regular rhythm.      Heart sounds: No murmur heard.  Pulmonary:      Effort: Pulmonary effort is normal. No respiratory distress.      Breath sounds: Normal breath sounds.   Abdominal:      General: There is no distension.      Palpations: Abdomen is soft.      Tenderness: There is no abdominal tenderness. There is no guarding.     Musculoskeletal:         General: No swelling.      Cervical back: Neck supple.      Right lower leg: No edema.      Left lower leg: No edema.     Skin:     General: Skin is warm and dry.      Capillary Refill: Capillary refill takes less than 2 seconds.      Findings: No rash.     Neurological:      Mental Status: He is alert and oriented to person, place, and time.     Psychiatric:         Mood and Affect: Mood normal.

## 2025-05-19 NOTE — ASSESSMENT & PLAN NOTE
- Chronic neck pain with bilateral radiation to trapezius  - S/p C5-C6 fusion in 2011  - History of trigger point inhections in trapezius in 2023 with 50% relief and MRI C-spine showed stable fusion; MRI L-spine showed mild facet hypertrophy at L4-L5  - Has been following pain management, last seen in 2024; does think it is helpful and does not want to follow up with pain due to high co-pay  - Tolerates with tizanidine 4 mg Q8H as needed and voltaren gel  - Encourage home exercises

## 2025-05-19 NOTE — ASSESSMENT & PLAN NOTE
- Patient has been complaining of fatigue and myalgia since last visit. He reports it has been years. Denies recent travel history or hunting. No wild animal exposure. No dogs at home. But does live close to the woods.   - Also reports throbbing pain when he has headache with blurry vision  - No family history of autoimmune and no new rashes noted  - TSH and cortisol checked last visit and wnl  - Will check iron panel, Lyme  ab, ESR, and CRP    Orders:    Lyme disease, PCR; Future    TIBC Panel (incl. Iron, TIBC, % Iron Saturation); Future    Sedimentation rate, automated; Future    C-reactive protein; Future

## 2025-05-19 NOTE — ASSESSMENT & PLAN NOTE
- Internal hemorrhoids per colonoscopy in 9/2023, next colonoscopy due in 9/2030  - Patient reports hematochezia and BRBPR which are not new to him, though denies change in stool diameter  - Due to family history of colon CA in sister, always concern for colon cancer though colonoscopy in 9/2023 revealed 2 subcentimeter polyps with biopsy proven tubular adenoma  - Discuss with patient and will have patient to follow up with GI as soon as possible with potential anoscopy in the office

## 2025-05-19 NOTE — PATIENT INSTRUCTIONS
Please reduce the dose of omeprazole to 20 mg daily for a month and followed by as needed Pepcid 20 mg    Please call:  [facility] [place]    [address], [place] [address] [phone]    Please call: Dr. Jeronimo Ames for GI follow up appointment  [phone]

## 2025-05-19 NOTE — ASSESSMENT & PLAN NOTE
- With associated history of chronic dyspepsia  - EGD in 5/2024 was normal and pathoilogy showed mild chronic inactive gastritis and negative H. pylori  - Higher risk for gastric adenocarcinoma but insufficient data to support routine surveillance with upper endoscopy following an initial screening in the absence of symptoms or to alter the patient's schedule for colon cancer screening based on the diagnosis of PA  - Vit B12 of 415, Hgb 14.7, and MCV 86 on 1/22/25  - Monthly IM B12 1,000 mg started 4/2024, still on monthly injection  - Decrease PPI dose by half to 20 mg daily for a month and wean off. Pepcdid as needed after that  - Provide a list of trigger foods to avoid to patient as well

## 2025-05-19 NOTE — ASSESSMENT & PLAN NOTE
- With associated chronic abdominal pain  - Family history of colon CA in sister s/p resection with colostomy   - Seen by GI , last office visit on 5/1/2024  - Colonoscopy in 9/2023 revealed 2 subcentimeter polyps with biopsy proven tubular adenoma, next due in 9/2030  - Has tried MiraLAX and Metamucil in the past w/o improvement; better with Linzess but due to cost, currently not on Linzess  - Currently on MiraLAX as needed. I discuss with patient to take it daily instead as he has a bowel movement every 3 days instead of daily. Also discuss with patient okay to increase to twice daily if tolerated

## 2025-05-27 ENCOUNTER — TELEPHONE (OUTPATIENT)
Age: 50
End: 2025-05-27

## 2025-05-27 NOTE — TELEPHONE ENCOUNTER
New Patient      Insurance   Current Insurance?Medicare A and B   Insurance E-verified? Y    History   Reason for appointment/active symptoms?  Establish care again / BPH screening     Has the patient had any previous Urologist(s)? Y [facility]     Was the patient seen in the ED? N     Labs/Imaging(Including Out Of Network)? In Chart     Records Requested? Y  Records Visible in EPIC? Y      Personal history of cancer? N     Appointment   Office location preference:  [facility]  ?   Appointment Details   Date:  8/6/25  Time:  3:0 pm   Location:  [facility]  Provider:  Pete  Does the appointment need further review?

## 2025-06-02 ENCOUNTER — APPOINTMENT (OUTPATIENT)
Dept: LAB | Facility: CLINIC | Age: 50
End: 2025-06-02
Payer: MEDICARE

## 2025-06-02 DIAGNOSIS — M79.10 MYALGIA: ICD-10-CM

## 2025-06-02 DIAGNOSIS — M79.10 MYALGIA: Primary | ICD-10-CM

## 2025-06-02 DIAGNOSIS — N40.0 BENIGN PROSTATIC HYPERPLASIA, UNSPECIFIED WHETHER LOWER URINARY TRACT SYMPTOMS PRESENT: ICD-10-CM

## 2025-06-02 LAB
B BURGDOR IGG+IGM SER QL IA: NEGATIVE
CRP SERPL QL: 1.1 MG/L
ERYTHROCYTE [SEDIMENTATION RATE] IN BLOOD: 12 MM/HOUR (ref 0–19)
IRON SATN MFR SERPL: 20 % (ref 15–50)
IRON SERPL-MCNC: 68 UG/DL (ref 50–212)
PSA SERPL-MCNC: 3.79 NG/ML (ref 0–4)
TIBC SERPL-MCNC: 348.6 UG/DL (ref 250–450)
TRANSFERRIN SERPL-MCNC: 249 MG/DL (ref 203–362)
UIBC SERPL-MCNC: 281 UG/DL (ref 155–355)

## 2025-06-02 PROCEDURE — 83540 ASSAY OF IRON: CPT

## 2025-06-02 PROCEDURE — 36415 COLL VENOUS BLD VENIPUNCTURE: CPT

## 2025-06-02 PROCEDURE — 85652 RBC SED RATE AUTOMATED: CPT

## 2025-06-02 PROCEDURE — 83550 IRON BINDING TEST: CPT

## 2025-06-02 PROCEDURE — 86618 LYME DISEASE ANTIBODY: CPT

## 2025-06-02 PROCEDURE — 86140 C-REACTIVE PROTEIN: CPT

## 2025-06-02 PROCEDURE — G0103 PSA SCREENING: HCPCS

## 2025-07-03 DIAGNOSIS — D51.0 PERNICIOUS ANEMIA: ICD-10-CM

## 2025-07-03 RX ORDER — CYANOCOBALAMIN 1000 UG/ML
INJECTION, SOLUTION INTRAMUSCULAR; SUBCUTANEOUS
Qty: 3 ML | Refills: 3 | Status: SHIPPED | OUTPATIENT
Start: 2025-07-03

## 2025-08-06 ENCOUNTER — OFFICE VISIT (OUTPATIENT)
Dept: UROLOGY | Facility: CLINIC | Age: 50
End: 2025-08-06
Payer: MEDICARE

## 2025-08-06 VITALS
SYSTOLIC BLOOD PRESSURE: 102 MMHG | BODY MASS INDEX: 23.92 KG/M2 | OXYGEN SATURATION: 99 % | HEIGHT: 63 IN | DIASTOLIC BLOOD PRESSURE: 60 MMHG | HEART RATE: 66 BPM | WEIGHT: 135 LBS

## 2025-08-06 DIAGNOSIS — R97.20 ELEVATED PSA: Primary | ICD-10-CM

## 2025-08-06 PROCEDURE — 99203 OFFICE O/P NEW LOW 30 MIN: CPT | Performed by: PHYSICIAN ASSISTANT

## (undated) DEVICE — HF-RESECTION ELECTRODE PLASMABUTTON BUTTON, 24 FR., 12°-30°, ESG TURIS: Brand: OLYMPUS

## (undated) DEVICE — INVIEW CLEAR LEGGINGS: Brand: CONVERTORS

## (undated) DEVICE — BASIC SINGLE BASIN 2-LF: Brand: MEDLINE INDUSTRIES, INC.

## (undated) DEVICE — GLOVE SRG BIOGEL ECLIPSE 7.5

## (undated) DEVICE — 2000CC GUARDIAN II: Brand: GUARDIAN

## (undated) DEVICE — SUT VICRYL 2-0 SH 27 IN UNDYED J417H

## (undated) DEVICE — PENROSE DRAIN, 18 X 3 8: Brand: CARDINAL HEALTH

## (undated) DEVICE — SCROTAL SUPPORT MED

## (undated) DEVICE — ROSEBUD DISSECTORS: Brand: DEROYAL

## (undated) DEVICE — LIGHT HANDLE COVER SLEEVE DISP BLUE STELLAR

## (undated) DEVICE — PACK TUR

## (undated) DEVICE — ELECTRODE NEEDLE MOD E-Z CLEAN 2.75IN 7CM -0013M

## (undated) DEVICE — CHLORHEXIDINE 4PCT 4 OZ

## (undated) DEVICE — MEDI-VAC YANKAUER SUCTION HANDLE W/STRAIGHT TIP & CONTROL VENT: Brand: CARDINAL HEALTH

## (undated) DEVICE — INTENDED FOR TISSUE SEPARATION, AND OTHER PROCEDURES THAT REQUIRE A SHARP SURGICAL BLADE TO PUNCTURE OR CUT.: Brand: BARD-PARKER SAFETY BLADES SIZE 15, STERILE

## (undated) DEVICE — CYSTO TUBING TUR Y IRRIGATION

## (undated) DEVICE — SUT SILK 2-0 18 IN A185H

## (undated) DEVICE — GLOVE INDICATOR PI UNDERGLOVE SZ 8 BLUE

## (undated) DEVICE — SUT SILK 0 SH 30 IN K834H

## (undated) DEVICE — ADHESIVE SKN CLSR HISTOACRYL FLEX 0.5ML LF

## (undated) DEVICE — Device

## (undated) DEVICE — UROCATCH BAG

## (undated) DEVICE — PREMIUM DRY TRAY LF: Brand: MEDLINE INDUSTRIES, INC.

## (undated) DEVICE — PAD GROUNDING ADULT

## (undated) DEVICE — MEDI-VAC NON-CONDUCTIVE SUCTION TUBING 6MM X 1.8M (6FT.) L: Brand: CARDINAL HEALTH

## (undated) DEVICE — CHLORAPREP HI-LITE 26ML ORANGE

## (undated) DEVICE — BAG URINE DRAINAGE 4000ML CONTINUOUS IRR

## (undated) DEVICE — PENCIL ELECTROSURG E-Z CLEAN -0035H

## (undated) DEVICE — BETHLEHEM UNIVERSAL MINOR GEN: Brand: CARDINAL HEALTH